# Patient Record
Sex: FEMALE | Race: WHITE | NOT HISPANIC OR LATINO | Employment: OTHER | URBAN - METROPOLITAN AREA
[De-identification: names, ages, dates, MRNs, and addresses within clinical notes are randomized per-mention and may not be internally consistent; named-entity substitution may affect disease eponyms.]

---

## 2017-01-03 ENCOUNTER — COMMUNICATION - HEALTHEAST (OUTPATIENT)
Dept: INTERNAL MEDICINE | Facility: CLINIC | Age: 82
End: 2017-01-03

## 2017-01-03 DIAGNOSIS — R52 PAIN: ICD-10-CM

## 2017-01-16 ENCOUNTER — COMMUNICATION - HEALTHEAST (OUTPATIENT)
Dept: INTERNAL MEDICINE | Facility: CLINIC | Age: 82
End: 2017-01-16

## 2017-01-16 DIAGNOSIS — M19.90 OSTEOARTHRITIS: ICD-10-CM

## 2017-01-16 DIAGNOSIS — R60.9 EDEMA: ICD-10-CM

## 2017-01-22 ENCOUNTER — COMMUNICATION - HEALTHEAST (OUTPATIENT)
Dept: INTERNAL MEDICINE | Facility: CLINIC | Age: 82
End: 2017-01-22

## 2017-01-22 DIAGNOSIS — E03.9 HYPOTHYROIDISM: ICD-10-CM

## 2017-01-27 ENCOUNTER — COMMUNICATION - HEALTHEAST (OUTPATIENT)
Dept: INTERNAL MEDICINE | Facility: CLINIC | Age: 82
End: 2017-01-27

## 2017-01-31 ENCOUNTER — RECORDS - HEALTHEAST (OUTPATIENT)
Dept: ADMINISTRATIVE | Facility: OTHER | Age: 82
End: 2017-01-31

## 2017-02-16 ENCOUNTER — RECORDS - HEALTHEAST (OUTPATIENT)
Dept: ADMINISTRATIVE | Facility: OTHER | Age: 82
End: 2017-02-16

## 2017-02-17 ENCOUNTER — COMMUNICATION - HEALTHEAST (OUTPATIENT)
Dept: SCHEDULING | Facility: CLINIC | Age: 82
End: 2017-02-17

## 2017-02-21 ENCOUNTER — OFFICE VISIT - HEALTHEAST (OUTPATIENT)
Dept: INTERNAL MEDICINE | Facility: CLINIC | Age: 82
End: 2017-02-21

## 2017-02-21 DIAGNOSIS — R60.9 EDEMA: ICD-10-CM

## 2017-02-21 DIAGNOSIS — R06.02 SHORTNESS OF BREATH: ICD-10-CM

## 2017-02-21 ASSESSMENT — MIFFLIN-ST. JEOR: SCORE: 1077.52

## 2017-02-22 ENCOUNTER — COMMUNICATION - HEALTHEAST (OUTPATIENT)
Dept: INTERNAL MEDICINE | Facility: CLINIC | Age: 82
End: 2017-02-22

## 2017-02-27 ENCOUNTER — AMBULATORY - HEALTHEAST (OUTPATIENT)
Dept: CARE COORDINATION | Facility: CLINIC | Age: 82
End: 2017-02-27

## 2017-02-28 ENCOUNTER — OFFICE VISIT - HEALTHEAST (OUTPATIENT)
Dept: INTERNAL MEDICINE | Facility: CLINIC | Age: 82
End: 2017-02-28

## 2017-02-28 DIAGNOSIS — Z01.818 PREOP EXAM FOR INTERNAL MEDICINE: ICD-10-CM

## 2017-02-28 DIAGNOSIS — E55.9 VITAMIN D DEFICIENCY: ICD-10-CM

## 2017-02-28 DIAGNOSIS — I48.0 PAROXYSMAL ATRIAL FIBRILLATION (H): ICD-10-CM

## 2017-02-28 ASSESSMENT — MIFFLIN-ST. JEOR: SCORE: 1085.46

## 2017-03-01 ENCOUNTER — COMMUNICATION - HEALTHEAST (OUTPATIENT)
Dept: INTERNAL MEDICINE | Facility: CLINIC | Age: 82
End: 2017-03-01

## 2017-03-03 ENCOUNTER — COMMUNICATION - HEALTHEAST (OUTPATIENT)
Dept: INTERNAL MEDICINE | Facility: CLINIC | Age: 82
End: 2017-03-03

## 2017-03-03 DIAGNOSIS — E78.5 HYPERLIPEMIA: ICD-10-CM

## 2017-03-07 ENCOUNTER — OFFICE VISIT - HEALTHEAST (OUTPATIENT)
Dept: CARDIOLOGY | Facility: CLINIC | Age: 82
End: 2017-03-07

## 2017-03-07 DIAGNOSIS — I48.0 PAROXYSMAL ATRIAL FIBRILLATION (H): ICD-10-CM

## 2017-03-07 DIAGNOSIS — I10 ESSENTIAL HYPERTENSION: ICD-10-CM

## 2017-03-07 LAB
ATRIAL RATE - MUSE: 78 BPM
DIASTOLIC BLOOD PRESSURE - MUSE: NORMAL MMHG
INTERPRETATION ECG - MUSE: NORMAL
P AXIS - MUSE: -26 DEGREES
PR INTERVAL - MUSE: 212 MS
QRS DURATION - MUSE: 86 MS
QT - MUSE: 424 MS
QTC - MUSE: 483 MS
R AXIS - MUSE: 57 DEGREES
SYSTOLIC BLOOD PRESSURE - MUSE: NORMAL MMHG
T AXIS - MUSE: 37 DEGREES
VENTRICULAR RATE- MUSE: 78 BPM

## 2017-03-07 ASSESSMENT — MIFFLIN-ST. JEOR: SCORE: 1090.45

## 2017-03-08 ASSESSMENT — MIFFLIN-ST. JEOR: SCORE: 1081.38

## 2017-03-14 ENCOUNTER — ANESTHESIA - HEALTHEAST (OUTPATIENT)
Dept: SURGERY | Facility: CLINIC | Age: 82
End: 2017-03-14

## 2017-03-14 ENCOUNTER — SURGERY - HEALTHEAST (OUTPATIENT)
Dept: SURGERY | Facility: CLINIC | Age: 82
End: 2017-03-14

## 2017-03-14 ASSESSMENT — MIFFLIN-ST. JEOR: SCORE: 1081.38

## 2017-03-15 ASSESSMENT — MIFFLIN-ST. JEOR: SCORE: 1102.25

## 2017-03-20 ENCOUNTER — OFFICE VISIT - HEALTHEAST (OUTPATIENT)
Dept: GERIATRICS | Facility: CLINIC | Age: 82
End: 2017-03-20

## 2017-03-20 ENCOUNTER — COMMUNICATION - HEALTHEAST (OUTPATIENT)
Dept: INTERNAL MEDICINE | Facility: CLINIC | Age: 82
End: 2017-03-20

## 2017-03-20 DIAGNOSIS — F39 MOOD DISORDER (H): ICD-10-CM

## 2017-03-20 DIAGNOSIS — D50.0 ANEMIA, BLOOD LOSS: ICD-10-CM

## 2017-03-20 DIAGNOSIS — K21.9 GERD (GASTROESOPHAGEAL REFLUX DISEASE): ICD-10-CM

## 2017-03-20 DIAGNOSIS — M17.11 OSTEOARTHRITIS OF RIGHT KNEE: ICD-10-CM

## 2017-03-20 DIAGNOSIS — I48.0 PAROXYSMAL ATRIAL FIBRILLATION (H): ICD-10-CM

## 2017-03-20 DIAGNOSIS — M85.80 OSTEOPENIA: ICD-10-CM

## 2017-03-20 DIAGNOSIS — E03.9 HYPOTHYROIDISM: ICD-10-CM

## 2017-03-23 ENCOUNTER — OFFICE VISIT - HEALTHEAST (OUTPATIENT)
Dept: GERIATRICS | Facility: CLINIC | Age: 82
End: 2017-03-23

## 2017-03-23 DIAGNOSIS — K21.9 GASTROESOPHAGEAL REFLUX DISEASE WITHOUT ESOPHAGITIS: ICD-10-CM

## 2017-03-23 DIAGNOSIS — R52 PAIN MANAGEMENT: ICD-10-CM

## 2017-03-23 DIAGNOSIS — F39 MOOD DISORDER (H): ICD-10-CM

## 2017-03-23 DIAGNOSIS — D50.0 ANEMIA, BLOOD LOSS: ICD-10-CM

## 2017-03-23 DIAGNOSIS — I48.0 PAROXYSMAL ATRIAL FIBRILLATION (H): ICD-10-CM

## 2017-03-23 DIAGNOSIS — M17.11 PRIMARY OSTEOARTHRITIS OF RIGHT KNEE: ICD-10-CM

## 2017-03-27 ENCOUNTER — COMMUNICATION - HEALTHEAST (OUTPATIENT)
Dept: INTERNAL MEDICINE | Facility: CLINIC | Age: 82
End: 2017-03-27

## 2017-03-27 ENCOUNTER — AMBULATORY - HEALTHEAST (OUTPATIENT)
Dept: GERIATRICS | Facility: CLINIC | Age: 82
End: 2017-03-27

## 2017-03-29 ENCOUNTER — AMBULATORY - HEALTHEAST (OUTPATIENT)
Dept: CARE COORDINATION | Facility: CLINIC | Age: 82
End: 2017-03-29

## 2017-04-04 ENCOUNTER — RECORDS - HEALTHEAST (OUTPATIENT)
Dept: ADMINISTRATIVE | Facility: OTHER | Age: 82
End: 2017-04-04

## 2017-04-10 ENCOUNTER — OFFICE VISIT - HEALTHEAST (OUTPATIENT)
Dept: INTERNAL MEDICINE | Facility: CLINIC | Age: 82
End: 2017-04-10

## 2017-04-10 ENCOUNTER — COMMUNICATION - HEALTHEAST (OUTPATIENT)
Dept: INTERNAL MEDICINE | Facility: CLINIC | Age: 82
End: 2017-04-10

## 2017-04-10 DIAGNOSIS — Z51.81 MEDICATION MONITORING ENCOUNTER: ICD-10-CM

## 2017-04-10 DIAGNOSIS — G89.18 POST-OPERATIVE PAIN: ICD-10-CM

## 2017-04-10 DIAGNOSIS — D64.9 ANEMIA: ICD-10-CM

## 2017-04-10 ASSESSMENT — MIFFLIN-ST. JEOR: SCORE: 1085.46

## 2017-04-11 ENCOUNTER — COMMUNICATION - HEALTHEAST (OUTPATIENT)
Dept: INTERNAL MEDICINE | Facility: CLINIC | Age: 82
End: 2017-04-11

## 2017-04-14 ENCOUNTER — COMMUNICATION - HEALTHEAST (OUTPATIENT)
Dept: INTERNAL MEDICINE | Facility: CLINIC | Age: 82
End: 2017-04-14

## 2017-04-14 DIAGNOSIS — R52 PAIN: ICD-10-CM

## 2017-04-18 ENCOUNTER — COMMUNICATION - HEALTHEAST (OUTPATIENT)
Dept: CARE COORDINATION | Facility: CLINIC | Age: 82
End: 2017-04-18

## 2017-04-20 ENCOUNTER — COMMUNICATION - HEALTHEAST (OUTPATIENT)
Dept: INTERNAL MEDICINE | Facility: CLINIC | Age: 82
End: 2017-04-20

## 2017-04-25 ENCOUNTER — COMMUNICATION - HEALTHEAST (OUTPATIENT)
Dept: INTERNAL MEDICINE | Facility: CLINIC | Age: 82
End: 2017-04-25

## 2017-04-27 ENCOUNTER — COMMUNICATION - HEALTHEAST (OUTPATIENT)
Dept: INTERNAL MEDICINE | Facility: CLINIC | Age: 82
End: 2017-04-27

## 2017-05-01 ENCOUNTER — COMMUNICATION - HEALTHEAST (OUTPATIENT)
Dept: INTERNAL MEDICINE | Facility: CLINIC | Age: 82
End: 2017-05-01

## 2017-05-01 ENCOUNTER — COMMUNICATION - HEALTHEAST (OUTPATIENT)
Dept: SCHEDULING | Facility: CLINIC | Age: 82
End: 2017-05-01

## 2017-05-01 DIAGNOSIS — E03.9 HYPOTHYROIDISM: ICD-10-CM

## 2017-05-02 ENCOUNTER — COMMUNICATION - HEALTHEAST (OUTPATIENT)
Dept: CARE COORDINATION | Facility: CLINIC | Age: 82
End: 2017-05-02

## 2017-05-03 ENCOUNTER — COMMUNICATION - HEALTHEAST (OUTPATIENT)
Dept: CARE COORDINATION | Facility: CLINIC | Age: 82
End: 2017-05-03

## 2017-05-24 ENCOUNTER — COMMUNICATION - HEALTHEAST (OUTPATIENT)
Dept: CARE COORDINATION | Facility: CLINIC | Age: 82
End: 2017-05-24

## 2017-05-26 ENCOUNTER — COMMUNICATION - HEALTHEAST (OUTPATIENT)
Dept: CARE COORDINATION | Facility: CLINIC | Age: 82
End: 2017-05-26

## 2017-06-01 ENCOUNTER — COMMUNICATION - HEALTHEAST (OUTPATIENT)
Dept: CARE COORDINATION | Facility: CLINIC | Age: 82
End: 2017-06-01

## 2017-06-02 ENCOUNTER — COMMUNICATION - HEALTHEAST (OUTPATIENT)
Dept: INTERNAL MEDICINE | Facility: CLINIC | Age: 82
End: 2017-06-02

## 2017-06-03 ENCOUNTER — COMMUNICATION - HEALTHEAST (OUTPATIENT)
Dept: CARDIOLOGY | Facility: CLINIC | Age: 82
End: 2017-06-03

## 2017-06-03 DIAGNOSIS — I48.0 PAROXYSMAL ATRIAL FIBRILLATION (H): ICD-10-CM

## 2017-06-06 ENCOUNTER — RECORDS - HEALTHEAST (OUTPATIENT)
Dept: MAMMOGRAPHY | Facility: CLINIC | Age: 82
End: 2017-06-06

## 2017-06-06 DIAGNOSIS — Z12.31 ENCOUNTER FOR SCREENING MAMMOGRAM FOR MALIGNANT NEOPLASM OF BREAST: ICD-10-CM

## 2017-06-08 ENCOUNTER — OFFICE VISIT - HEALTHEAST (OUTPATIENT)
Dept: INTERNAL MEDICINE | Facility: CLINIC | Age: 82
End: 2017-06-08

## 2017-06-08 DIAGNOSIS — R60.9 EDEMA: ICD-10-CM

## 2017-06-08 DIAGNOSIS — I50.32 DIASTOLIC CHF, CHRONIC (H): ICD-10-CM

## 2017-06-09 ENCOUNTER — COMMUNICATION - HEALTHEAST (OUTPATIENT)
Dept: INTERNAL MEDICINE | Facility: CLINIC | Age: 82
End: 2017-06-09

## 2017-06-12 ENCOUNTER — COMMUNICATION - HEALTHEAST (OUTPATIENT)
Dept: CARE COORDINATION | Facility: CLINIC | Age: 82
End: 2017-06-12

## 2017-06-20 ENCOUNTER — RECORDS - HEALTHEAST (OUTPATIENT)
Dept: ADMINISTRATIVE | Facility: OTHER | Age: 82
End: 2017-06-20

## 2017-06-22 ENCOUNTER — OFFICE VISIT - HEALTHEAST (OUTPATIENT)
Dept: CARDIOLOGY | Facility: CLINIC | Age: 82
End: 2017-06-22

## 2017-06-22 DIAGNOSIS — I10 ESSENTIAL HYPERTENSION WITH GOAL BLOOD PRESSURE LESS THAN 140/90: ICD-10-CM

## 2017-06-22 DIAGNOSIS — I48.0 PAROXYSMAL ATRIAL FIBRILLATION (H): ICD-10-CM

## 2017-06-22 DIAGNOSIS — G47.33 OBSTRUCTIVE SLEEP APNEA (ADULT) (PEDIATRIC): ICD-10-CM

## 2017-06-22 DIAGNOSIS — I50.31 ACUTE DIASTOLIC HEART FAILURE (H): ICD-10-CM

## 2017-06-22 ASSESSMENT — MIFFLIN-ST. JEOR: SCORE: 1058.7

## 2017-06-25 ENCOUNTER — COMMUNICATION - HEALTHEAST (OUTPATIENT)
Dept: INTERNAL MEDICINE | Facility: CLINIC | Age: 82
End: 2017-06-25

## 2017-06-25 DIAGNOSIS — K21.9 ESOPHAGEAL REFLUX: ICD-10-CM

## 2017-07-10 ENCOUNTER — OFFICE VISIT - HEALTHEAST (OUTPATIENT)
Dept: INTERNAL MEDICINE | Facility: CLINIC | Age: 82
End: 2017-07-10

## 2017-07-10 DIAGNOSIS — E78.5 HYPERLIPIDEMIA: ICD-10-CM

## 2017-07-10 DIAGNOSIS — E03.9 HYPOTHYROIDISM: ICD-10-CM

## 2017-07-10 DIAGNOSIS — M81.0 OSTEOPOROSIS: ICD-10-CM

## 2017-07-10 DIAGNOSIS — Z51.81 MEDICATION MONITORING ENCOUNTER: ICD-10-CM

## 2017-07-10 LAB
CHOLEST SERPL-MCNC: 178 MG/DL
FASTING STATUS PATIENT QL REPORTED: NORMAL
HDLC SERPL-MCNC: 52 MG/DL
LDLC SERPL CALC-MCNC: 100 MG/DL
TRIGL SERPL-MCNC: 130 MG/DL

## 2017-07-10 ASSESSMENT — MIFFLIN-ST. JEOR: SCORE: 1061.88

## 2017-07-12 ENCOUNTER — COMMUNICATION - HEALTHEAST (OUTPATIENT)
Dept: INTERNAL MEDICINE | Facility: CLINIC | Age: 82
End: 2017-07-12

## 2017-08-13 ENCOUNTER — COMMUNICATION - HEALTHEAST (OUTPATIENT)
Dept: RHEUMATOLOGY | Facility: CLINIC | Age: 82
End: 2017-08-13

## 2017-08-13 DIAGNOSIS — G89.29 OTHER CHRONIC PAIN: ICD-10-CM

## 2017-08-14 ENCOUNTER — COMMUNICATION - HEALTHEAST (OUTPATIENT)
Dept: RHEUMATOLOGY | Facility: CLINIC | Age: 82
End: 2017-08-14

## 2017-08-14 DIAGNOSIS — G89.29 OTHER CHRONIC PAIN: ICD-10-CM

## 2017-08-15 ENCOUNTER — OFFICE VISIT - HEALTHEAST (OUTPATIENT)
Dept: INTERNAL MEDICINE | Facility: CLINIC | Age: 82
End: 2017-08-15

## 2017-08-15 DIAGNOSIS — F32.9 REACTIVE DEPRESSION: ICD-10-CM

## 2017-08-15 ASSESSMENT — MIFFLIN-ST. JEOR: SCORE: 1069.13

## 2017-08-29 ENCOUNTER — RECORDS - HEALTHEAST (OUTPATIENT)
Dept: ADMINISTRATIVE | Facility: OTHER | Age: 82
End: 2017-08-29

## 2017-09-08 ENCOUNTER — COMMUNICATION - HEALTHEAST (OUTPATIENT)
Dept: CARE COORDINATION | Facility: CLINIC | Age: 82
End: 2017-09-08

## 2017-09-11 ENCOUNTER — RECORDS - HEALTHEAST (OUTPATIENT)
Dept: BONE DENSITY | Facility: CLINIC | Age: 82
End: 2017-09-11

## 2017-09-11 ENCOUNTER — RECORDS - HEALTHEAST (OUTPATIENT)
Dept: ADMINISTRATIVE | Facility: OTHER | Age: 82
End: 2017-09-11

## 2017-09-11 DIAGNOSIS — M81.0 AGE-RELATED OSTEOPOROSIS WITHOUT CURRENT PATHOLOGICAL FRACTURE: ICD-10-CM

## 2017-09-14 ENCOUNTER — COMMUNICATION - HEALTHEAST (OUTPATIENT)
Dept: CARE COORDINATION | Facility: CLINIC | Age: 82
End: 2017-09-14

## 2017-09-26 ENCOUNTER — COMMUNICATION - HEALTHEAST (OUTPATIENT)
Dept: CARE COORDINATION | Facility: CLINIC | Age: 82
End: 2017-09-26

## 2017-09-26 ENCOUNTER — COMMUNICATION - HEALTHEAST (OUTPATIENT)
Dept: INTERNAL MEDICINE | Facility: CLINIC | Age: 82
End: 2017-09-26

## 2017-09-26 ENCOUNTER — OFFICE VISIT - HEALTHEAST (OUTPATIENT)
Dept: INTERNAL MEDICINE | Facility: CLINIC | Age: 82
End: 2017-09-26

## 2017-09-26 ENCOUNTER — COMMUNICATION - HEALTHEAST (OUTPATIENT)
Dept: SCHEDULING | Facility: CLINIC | Age: 82
End: 2017-09-26

## 2017-09-26 ENCOUNTER — HOSPITAL ENCOUNTER (OUTPATIENT)
Dept: LAB | Age: 82
Setting detail: SPECIMEN
Discharge: HOME | End: 2017-09-26

## 2017-09-26 DIAGNOSIS — M81.0 OSTEOPOROSIS: ICD-10-CM

## 2017-09-26 DIAGNOSIS — M54.50 LOW BACK PAIN: ICD-10-CM

## 2017-09-26 DIAGNOSIS — Z23 NEED FOR INFLUENZA VACCINATION: ICD-10-CM

## 2017-09-26 ASSESSMENT — MIFFLIN-ST. JEOR: SCORE: 1083.65

## 2017-09-27 ENCOUNTER — COMMUNICATION - HEALTHEAST (OUTPATIENT)
Dept: INTERNAL MEDICINE | Facility: CLINIC | Age: 82
End: 2017-09-27

## 2017-10-13 ENCOUNTER — COMMUNICATION - HEALTHEAST (OUTPATIENT)
Dept: INTERNAL MEDICINE | Facility: CLINIC | Age: 82
End: 2017-10-13

## 2017-10-13 DIAGNOSIS — M54.50 LOW BACK PAIN: ICD-10-CM

## 2017-10-16 ENCOUNTER — COMMUNICATION - HEALTHEAST (OUTPATIENT)
Dept: CARE COORDINATION | Facility: CLINIC | Age: 82
End: 2017-10-16

## 2017-10-23 ENCOUNTER — COMMUNICATION - HEALTHEAST (OUTPATIENT)
Dept: CARE COORDINATION | Facility: CLINIC | Age: 82
End: 2017-10-23

## 2017-10-24 ENCOUNTER — OFFICE VISIT - HEALTHEAST (OUTPATIENT)
Dept: INTERNAL MEDICINE | Facility: CLINIC | Age: 82
End: 2017-10-24

## 2017-10-24 ENCOUNTER — COMMUNICATION - HEALTHEAST (OUTPATIENT)
Dept: INTERNAL MEDICINE | Facility: CLINIC | Age: 82
End: 2017-10-24

## 2017-10-24 DIAGNOSIS — M81.0 OSTEOPOROSIS: ICD-10-CM

## 2017-10-24 ASSESSMENT — MIFFLIN-ST. JEOR: SCORE: 1093.17

## 2017-10-27 ENCOUNTER — COMMUNICATION - HEALTHEAST (OUTPATIENT)
Dept: INTERNAL MEDICINE | Facility: CLINIC | Age: 82
End: 2017-10-27

## 2017-10-27 ENCOUNTER — COMMUNICATION - HEALTHEAST (OUTPATIENT)
Dept: CARE COORDINATION | Facility: CLINIC | Age: 82
End: 2017-10-27

## 2017-11-01 ENCOUNTER — COMMUNICATION - HEALTHEAST (OUTPATIENT)
Dept: CARE COORDINATION | Facility: CLINIC | Age: 82
End: 2017-11-01

## 2017-11-02 ENCOUNTER — OFFICE VISIT - HEALTHEAST (OUTPATIENT)
Dept: CARDIOLOGY | Facility: CLINIC | Age: 82
End: 2017-11-02

## 2017-11-02 ENCOUNTER — AMBULATORY - HEALTHEAST (OUTPATIENT)
Dept: CARDIOLOGY | Facility: CLINIC | Age: 82
End: 2017-11-02

## 2017-11-02 DIAGNOSIS — I50.30 HEART FAILURE WITH PRESERVED EJECTION FRACTION (H): ICD-10-CM

## 2017-11-02 ASSESSMENT — MIFFLIN-ST. JEOR: SCORE: 1074.57

## 2017-11-06 ENCOUNTER — COMMUNICATION - HEALTHEAST (OUTPATIENT)
Dept: CARDIOLOGY | Facility: CLINIC | Age: 82
End: 2017-11-06

## 2017-11-22 ENCOUNTER — COMMUNICATION - HEALTHEAST (OUTPATIENT)
Dept: CARDIOLOGY | Facility: CLINIC | Age: 82
End: 2017-11-22

## 2017-11-22 ENCOUNTER — COMMUNICATION - HEALTHEAST (OUTPATIENT)
Dept: CARE COORDINATION | Facility: CLINIC | Age: 82
End: 2017-11-22

## 2017-11-22 DIAGNOSIS — I50.30 HEART FAILURE WITH PRESERVED EJECTION FRACTION (H): ICD-10-CM

## 2017-11-27 ENCOUNTER — COMMUNICATION - HEALTHEAST (OUTPATIENT)
Dept: CARE COORDINATION | Facility: CLINIC | Age: 82
End: 2017-11-27

## 2017-11-30 ENCOUNTER — OFFICE VISIT - HEALTHEAST (OUTPATIENT)
Dept: CARDIOLOGY | Facility: CLINIC | Age: 82
End: 2017-11-30

## 2017-11-30 ENCOUNTER — RECORDS - HEALTHEAST (OUTPATIENT)
Dept: GENERAL RADIOLOGY | Facility: CLINIC | Age: 82
End: 2017-11-30

## 2017-11-30 ENCOUNTER — OFFICE VISIT - HEALTHEAST (OUTPATIENT)
Dept: INTERNAL MEDICINE | Facility: CLINIC | Age: 82
End: 2017-11-30

## 2017-11-30 ENCOUNTER — AMBULATORY - HEALTHEAST (OUTPATIENT)
Dept: CARDIOLOGY | Facility: CLINIC | Age: 82
End: 2017-11-30

## 2017-11-30 DIAGNOSIS — M54.50 LOW BACK PAIN: ICD-10-CM

## 2017-11-30 DIAGNOSIS — I10 ESSENTIAL HYPERTENSION WITH GOAL BLOOD PRESSURE LESS THAN 140/90: ICD-10-CM

## 2017-11-30 DIAGNOSIS — M54.2 CERVICALGIA: ICD-10-CM

## 2017-11-30 DIAGNOSIS — I50.30 HEART FAILURE WITH PRESERVED EJECTION FRACTION (H): ICD-10-CM

## 2017-11-30 ASSESSMENT — MIFFLIN-ST. JEOR
SCORE: 1088.18
SCORE: 1097.25

## 2017-12-03 ENCOUNTER — COMMUNICATION - HEALTHEAST (OUTPATIENT)
Dept: CARDIOLOGY | Facility: CLINIC | Age: 82
End: 2017-12-03

## 2017-12-03 DIAGNOSIS — I48.0 PAROXYSMAL ATRIAL FIBRILLATION (H): ICD-10-CM

## 2017-12-04 ENCOUNTER — COMMUNICATION - HEALTHEAST (OUTPATIENT)
Dept: CARE COORDINATION | Facility: CLINIC | Age: 82
End: 2017-12-04

## 2017-12-05 ENCOUNTER — RECORDS - HEALTHEAST (OUTPATIENT)
Dept: ADMINISTRATIVE | Facility: OTHER | Age: 82
End: 2017-12-05

## 2017-12-13 ENCOUNTER — RECORDS - HEALTHEAST (OUTPATIENT)
Dept: ADMINISTRATIVE | Facility: OTHER | Age: 82
End: 2017-12-13

## 2017-12-15 ENCOUNTER — COMMUNICATION - HEALTHEAST (OUTPATIENT)
Dept: INTERNAL MEDICINE | Facility: CLINIC | Age: 82
End: 2017-12-15

## 2017-12-21 ENCOUNTER — RECORDS - HEALTHEAST (OUTPATIENT)
Dept: ADMINISTRATIVE | Facility: OTHER | Age: 82
End: 2017-12-21

## 2017-12-29 ENCOUNTER — RECORDS - HEALTHEAST (OUTPATIENT)
Dept: ADMINISTRATIVE | Facility: OTHER | Age: 82
End: 2017-12-29

## 2018-01-05 ENCOUNTER — COMMUNICATION - HEALTHEAST (OUTPATIENT)
Dept: CARE COORDINATION | Facility: CLINIC | Age: 83
End: 2018-01-05

## 2018-01-05 ENCOUNTER — COMMUNICATION - HEALTHEAST (OUTPATIENT)
Dept: CARDIOLOGY | Facility: CLINIC | Age: 83
End: 2018-01-05

## 2018-01-09 ENCOUNTER — COMMUNICATION - HEALTHEAST (OUTPATIENT)
Dept: TELEHEALTH | Facility: CLINIC | Age: 83
End: 2018-01-09

## 2018-01-10 ENCOUNTER — COMMUNICATION - HEALTHEAST (OUTPATIENT)
Dept: INTERNAL MEDICINE | Facility: CLINIC | Age: 83
End: 2018-01-10

## 2018-01-10 DIAGNOSIS — M54.50 LOW BACK PAIN: ICD-10-CM

## 2018-01-15 ENCOUNTER — OFFICE VISIT - HEALTHEAST (OUTPATIENT)
Dept: INTERNAL MEDICINE | Facility: CLINIC | Age: 83
End: 2018-01-15

## 2018-01-15 ENCOUNTER — COMMUNICATION - HEALTHEAST (OUTPATIENT)
Dept: CARDIOLOGY | Facility: CLINIC | Age: 83
End: 2018-01-15

## 2018-01-15 ENCOUNTER — COMMUNICATION - HEALTHEAST (OUTPATIENT)
Dept: INTERNAL MEDICINE | Facility: CLINIC | Age: 83
End: 2018-01-15

## 2018-01-15 DIAGNOSIS — T50.2X5A DIURETIC-INDUCED HYPOKALEMIA: ICD-10-CM

## 2018-01-15 DIAGNOSIS — E87.6 DIURETIC-INDUCED HYPOKALEMIA: ICD-10-CM

## 2018-01-15 DIAGNOSIS — D50.9 IRON DEFICIENCY ANEMIA: ICD-10-CM

## 2018-01-15 DIAGNOSIS — D64.9 ANEMIA: ICD-10-CM

## 2018-01-15 DIAGNOSIS — I50.30 HEART FAILURE WITH PRESERVED EJECTION FRACTION (H): ICD-10-CM

## 2018-01-15 LAB
ERYTHROCYTE [SEDIMENTATION RATE] IN BLOOD BY WESTERGREN METHOD: 23 MM/HR (ref 0–20)
RETICS # AUTO: 0.08 MILL/UL (ref 0.01–0.11)

## 2018-01-16 LAB
FERRITIN SERPL-MCNC: 17 NG/ML (ref 10–130)
IRON SATN MFR SERPL: 6 % (ref 20–50)
IRON SERPL-MCNC: 31 UG/DL (ref 42–175)
TIBC SERPL-MCNC: 527 UG/DL (ref 313–563)
TRANSFERRIN SERPL-MCNC: 422 MG/DL (ref 212–360)
VIT B12 SERPL-MCNC: 468 PG/ML (ref 213–816)

## 2018-01-17 ENCOUNTER — OFFICE VISIT - HEALTHEAST (OUTPATIENT)
Dept: CARDIOLOGY | Facility: CLINIC | Age: 83
End: 2018-01-17

## 2018-01-17 DIAGNOSIS — I50.30 HEART FAILURE WITH PRESERVED EJECTION FRACTION (H): ICD-10-CM

## 2018-01-17 DIAGNOSIS — I10 ESSENTIAL HYPERTENSION WITH GOAL BLOOD PRESSURE LESS THAN 140/90: ICD-10-CM

## 2018-01-17 DIAGNOSIS — I48.0 PAROXYSMAL ATRIAL FIBRILLATION (H): ICD-10-CM

## 2018-01-17 DIAGNOSIS — R60.9 EDEMA: ICD-10-CM

## 2018-01-17 DIAGNOSIS — I05.0 MITRAL VALVE STENOSIS, MILD: ICD-10-CM

## 2018-01-17 LAB
ALBUMIN PERCENT: 60.7 % (ref 51–67)
ALBUMIN SERPL ELPH-MCNC: 4.3 G/DL (ref 3.2–4.7)
ALPHA 1 PERCENT: 2.6 % (ref 2–4)
ALPHA 2 PERCENT: 12.5 % (ref 5–13)
ALPHA1 GLOB SERPL ELPH-MCNC: 0.2 G/DL (ref 0.1–0.3)
ALPHA2 GLOB SERPL ELPH-MCNC: 0.9 G/DL (ref 0.4–0.9)
B-GLOBULIN SERPL ELPH-MCNC: 1 G/DL (ref 0.7–1.2)
BETA PERCENT: 14.6 % (ref 10–17)
GAMMA GLOB SERPL ELPH-MCNC: 0.7 G/DL (ref 0.6–1.4)
GAMMA GLOBULIN PERCENT: 9.6 % (ref 9–20)
PATH ICD:: NORMAL
PROT PATTERN SERPL ELPH-IMP: NORMAL
PROT SERPL-MCNC: 7.1 G/DL (ref 6–8)
REVIEWING PATHOLOGIST: NORMAL

## 2018-01-17 ASSESSMENT — MIFFLIN-ST. JEOR: SCORE: 1106.32

## 2018-01-19 ENCOUNTER — RECORDS - HEALTHEAST (OUTPATIENT)
Dept: ADMINISTRATIVE | Facility: OTHER | Age: 83
End: 2018-01-19

## 2018-01-19 ENCOUNTER — COMMUNICATION - HEALTHEAST (OUTPATIENT)
Dept: INTERNAL MEDICINE | Facility: CLINIC | Age: 83
End: 2018-01-19

## 2018-01-22 ENCOUNTER — HOSPITAL ENCOUNTER (OUTPATIENT)
Dept: CARDIOLOGY | Facility: HOSPITAL | Age: 83
Discharge: HOME | End: 2018-01-22
Attending: INTERNAL MEDICINE

## 2018-01-22 DIAGNOSIS — R60.9 EDEMA: ICD-10-CM

## 2018-01-22 DIAGNOSIS — I50.30 HEART FAILURE WITH PRESERVED EJECTION FRACTION (H): ICD-10-CM

## 2018-01-22 DIAGNOSIS — I48.0 PAROXYSMAL ATRIAL FIBRILLATION (H): ICD-10-CM

## 2018-01-25 ENCOUNTER — COMMUNICATION - HEALTHEAST (OUTPATIENT)
Dept: INTERNAL MEDICINE | Facility: CLINIC | Age: 83
End: 2018-01-25

## 2018-01-25 DIAGNOSIS — D50.9 IRON DEFICIENCY ANEMIA: ICD-10-CM

## 2018-01-26 ENCOUNTER — COMMUNICATION - HEALTHEAST (OUTPATIENT)
Dept: CARE COORDINATION | Facility: CLINIC | Age: 83
End: 2018-01-26

## 2018-01-26 DIAGNOSIS — I50.30 HEART FAILURE WITH PRESERVED EJECTION FRACTION (H): ICD-10-CM

## 2018-01-29 ENCOUNTER — COMMUNICATION - HEALTHEAST (OUTPATIENT)
Dept: TELEHEALTH | Facility: CLINIC | Age: 83
End: 2018-01-29

## 2018-01-30 ENCOUNTER — OFFICE VISIT - HEALTHEAST (OUTPATIENT)
Dept: CARDIOLOGY | Facility: CLINIC | Age: 83
End: 2018-01-30

## 2018-01-30 DIAGNOSIS — I10 ESSENTIAL HYPERTENSION: ICD-10-CM

## 2018-01-30 DIAGNOSIS — I48.0 PAROXYSMAL ATRIAL FIBRILLATION (H): ICD-10-CM

## 2018-01-30 DIAGNOSIS — R06.02 SOB (SHORTNESS OF BREATH): ICD-10-CM

## 2018-01-30 ASSESSMENT — MIFFLIN-ST. JEOR: SCORE: 1119.93

## 2018-02-09 ENCOUNTER — COMMUNICATION - HEALTHEAST (OUTPATIENT)
Dept: CARE COORDINATION | Facility: CLINIC | Age: 83
End: 2018-02-09

## 2018-02-09 DIAGNOSIS — J11.1 INFLUENZA: ICD-10-CM

## 2018-02-14 ENCOUNTER — COMMUNICATION - HEALTHEAST (OUTPATIENT)
Dept: CARE COORDINATION | Facility: CLINIC | Age: 83
End: 2018-02-14

## 2018-02-15 ENCOUNTER — COMMUNICATION - HEALTHEAST (OUTPATIENT)
Dept: INTERNAL MEDICINE | Facility: CLINIC | Age: 83
End: 2018-02-15

## 2018-02-19 ENCOUNTER — OFFICE VISIT - HEALTHEAST (OUTPATIENT)
Dept: INTERNAL MEDICINE | Facility: CLINIC | Age: 83
End: 2018-02-19

## 2018-02-19 DIAGNOSIS — L30.9 DERMATITIS: ICD-10-CM

## 2018-02-19 DIAGNOSIS — I50.32 CHRONIC DIASTOLIC CONGESTIVE HEART FAILURE (H): ICD-10-CM

## 2018-02-19 DIAGNOSIS — Z51.81 MEDICATION MONITORING ENCOUNTER: ICD-10-CM

## 2018-02-19 DIAGNOSIS — D50.9 IRON DEFICIENCY ANEMIA: ICD-10-CM

## 2018-02-19 LAB
ANION GAP SERPL CALCULATED.3IONS-SCNC: 9 MMOL/L (ref 5–18)
BUN SERPL-MCNC: 16 MG/DL (ref 8–28)
CALCIUM SERPL-MCNC: 9.4 MG/DL (ref 8.5–10.5)
CHLORIDE BLD-SCNC: 98 MMOL/L (ref 98–107)
CO2 SERPL-SCNC: 30 MMOL/L (ref 22–31)
CREAT SERPL-MCNC: 0.76 MG/DL (ref 0.6–1.1)
ERYTHROCYTE [DISTWIDTH] IN BLOOD BY AUTOMATED COUNT: 14.8 % (ref 11–14.5)
GFR SERPL CREATININE-BSD FRML MDRD: >60 ML/MIN/1.73M2
GLUCOSE BLD-MCNC: 71 MG/DL (ref 70–125)
HCT VFR BLD AUTO: 35.5 % (ref 35–47)
HGB BLD-MCNC: 11.6 G/DL (ref 12–16)
MCH RBC QN AUTO: 25.5 PG (ref 27–34)
MCHC RBC AUTO-ENTMCNC: 32.7 G/DL (ref 32–36)
MCV RBC AUTO: 78 FL (ref 80–100)
PLATELET # BLD AUTO: 243 THOU/UL (ref 140–440)
PMV BLD AUTO: 6.9 FL (ref 7–10)
POTASSIUM BLD-SCNC: 4.2 MMOL/L (ref 3.5–5)
RBC # BLD AUTO: 4.55 MILL/UL (ref 3.8–5.4)
SODIUM SERPL-SCNC: 137 MMOL/L (ref 136–145)
WBC: 6.6 THOU/UL (ref 4–11)

## 2018-02-23 ENCOUNTER — HOSPITAL ENCOUNTER (OUTPATIENT)
Dept: NUCLEAR MEDICINE | Facility: HOSPITAL | Age: 83
Discharge: HOME | End: 2018-02-23
Attending: INTERNAL MEDICINE

## 2018-02-23 ENCOUNTER — HOSPITAL ENCOUNTER (OUTPATIENT)
Dept: CARDIOLOGY | Facility: HOSPITAL | Age: 83
Discharge: HOME | End: 2018-02-23
Attending: INTERNAL MEDICINE

## 2018-02-23 DIAGNOSIS — R06.02 SOB (SHORTNESS OF BREATH): ICD-10-CM

## 2018-02-23 LAB
CV STRESS CURRENT BP HE: NORMAL
CV STRESS CURRENT HR HE: 102
CV STRESS CURRENT HR HE: 106
CV STRESS CURRENT HR HE: 106
CV STRESS CURRENT HR HE: 107
CV STRESS CURRENT HR HE: 108
CV STRESS CURRENT HR HE: 115
CV STRESS CURRENT HR HE: 117
CV STRESS CURRENT HR HE: 118
CV STRESS CURRENT HR HE: 118
CV STRESS CURRENT HR HE: 121
CV STRESS CURRENT HR HE: 122
CV STRESS CURRENT HR HE: 122
CV STRESS CURRENT HR HE: 78
CV STRESS CURRENT HR HE: 78
CV STRESS CURRENT HR HE: 81
CV STRESS CURRENT HR HE: 85
CV STRESS CURRENT HR HE: 87
CV STRESS CURRENT HR HE: 92
CV STRESS CURRENT HR HE: 92
CV STRESS CURRENT HR HE: 93
CV STRESS CURRENT HR HE: 94
CV STRESS CURRENT HR HE: 95
CV STRESS CURRENT HR HE: 97
CV STRESS CURRENT HR HE: 98
CV STRESS CURRENT HR HE: 98
CV STRESS CURRENT HR HE: 99
CV STRESS CURRENT HR HE: 99
CV STRESS DEVIATION TIME HE: NORMAL
CV STRESS ECHO PERCENT HR HE: NORMAL
CV STRESS EXERCISE STAGE HE: NORMAL
CV STRESS FINAL RESTING BP HE: NORMAL
CV STRESS FINAL RESTING HR HE: 94
CV STRESS MAX HR HE: 122
CV STRESS MAX TREADMILL GRADE HE: 0
CV STRESS MAX TREADMILL SPEED HE: 0
CV STRESS PEAK DIA BP HE: NORMAL
CV STRESS PEAK SYS BP HE: NORMAL
CV STRESS PHASE HE: NORMAL
CV STRESS PROTOCOL HE: NORMAL
CV STRESS RESTING PT POSITION HE: NORMAL
CV STRESS RESTING PT POSITION HE: NORMAL
CV STRESS ST DEVIATION AMOUNT HE: NORMAL
CV STRESS ST DEVIATION ELEVATION HE: NORMAL
CV STRESS ST EVELATION AMOUNT HE: NORMAL
CV STRESS TEST TYPE HE: NORMAL
CV STRESS TOTAL STAGE TIME MIN 1 HE: NORMAL
NUC STRESS EJECTION FRACTION: 63 %
STRESS ECHO BASELINE BP: NORMAL
STRESS ECHO BASELINE HR: 82
STRESS ECHO CALCULATED PERCENT HR: 88 %
STRESS ECHO LAST STRESS BP: NORMAL
STRESS ECHO LAST STRESS HR: 115

## 2018-02-26 ENCOUNTER — COMMUNICATION - HEALTHEAST (OUTPATIENT)
Dept: CARE COORDINATION | Facility: CLINIC | Age: 83
End: 2018-02-26

## 2018-03-08 ENCOUNTER — AMBULATORY - HEALTHEAST (OUTPATIENT)
Dept: LAB | Facility: CLINIC | Age: 83
End: 2018-03-08

## 2018-03-08 DIAGNOSIS — Z12.11 SCREENING FOR COLON CANCER: ICD-10-CM

## 2018-03-08 LAB
HEMOCCULT SP1 STL QL: NEGATIVE
HEMOCCULT SP2 STL QL: NEGATIVE
HEMOCCULT SP3 STL QL: NEGATIVE

## 2018-03-09 ENCOUNTER — AMBULATORY - HEALTHEAST (OUTPATIENT)
Dept: PULMONOLOGY | Facility: OTHER | Age: 83
End: 2018-03-09

## 2018-03-09 ENCOUNTER — OFFICE VISIT - HEALTHEAST (OUTPATIENT)
Dept: CARDIOLOGY | Facility: CLINIC | Age: 83
End: 2018-03-09

## 2018-03-09 DIAGNOSIS — I48.0 PAROXYSMAL ATRIAL FIBRILLATION (H): ICD-10-CM

## 2018-03-09 DIAGNOSIS — I10 ESSENTIAL HYPERTENSION WITH GOAL BLOOD PRESSURE LESS THAN 140/90: ICD-10-CM

## 2018-03-09 DIAGNOSIS — R06.09 DYSPNEA ON EXERTION: ICD-10-CM

## 2018-03-09 DIAGNOSIS — R06.02 SOB (SHORTNESS OF BREATH): ICD-10-CM

## 2018-03-09 DIAGNOSIS — I50.30 HEART FAILURE WITH PRESERVED EJECTION FRACTION (H): ICD-10-CM

## 2018-03-09 ASSESSMENT — MIFFLIN-ST. JEOR: SCORE: 1129

## 2018-03-16 ENCOUNTER — COMMUNICATION - HEALTHEAST (OUTPATIENT)
Dept: CARE COORDINATION | Facility: CLINIC | Age: 83
End: 2018-03-16

## 2018-03-19 ENCOUNTER — RECORDS - HEALTHEAST (OUTPATIENT)
Dept: ADMINISTRATIVE | Facility: OTHER | Age: 83
End: 2018-03-19

## 2018-03-26 ENCOUNTER — COMMUNICATION - HEALTHEAST (OUTPATIENT)
Dept: INTERNAL MEDICINE | Facility: CLINIC | Age: 83
End: 2018-03-26

## 2018-03-29 ENCOUNTER — RECORDS - HEALTHEAST (OUTPATIENT)
Dept: ADMINISTRATIVE | Facility: OTHER | Age: 83
End: 2018-03-29

## 2018-03-29 ENCOUNTER — COMMUNICATION - HEALTHEAST (OUTPATIENT)
Dept: INTERNAL MEDICINE | Facility: CLINIC | Age: 83
End: 2018-03-29

## 2018-03-29 ENCOUNTER — OFFICE VISIT - HEALTHEAST (OUTPATIENT)
Dept: INTERNAL MEDICINE | Facility: CLINIC | Age: 83
End: 2018-03-29

## 2018-03-29 DIAGNOSIS — R29.3 POSTURAL INSTABILITY: ICD-10-CM

## 2018-03-30 ENCOUNTER — AMBULATORY - HEALTHEAST (OUTPATIENT)
Dept: NURSING | Facility: CLINIC | Age: 83
End: 2018-03-30

## 2018-04-02 ENCOUNTER — COMMUNICATION - HEALTHEAST (OUTPATIENT)
Dept: INTERNAL MEDICINE | Facility: CLINIC | Age: 83
End: 2018-04-02

## 2018-04-19 ENCOUNTER — AMBULATORY - HEALTHEAST (OUTPATIENT)
Dept: INTERNAL MEDICINE | Facility: CLINIC | Age: 83
End: 2018-04-19

## 2018-04-19 DIAGNOSIS — M81.0 OSTEOPOROSIS: ICD-10-CM

## 2018-04-26 ENCOUNTER — AMBULATORY - HEALTHEAST (OUTPATIENT)
Dept: NURSING | Facility: CLINIC | Age: 83
End: 2018-04-26

## 2018-04-29 ENCOUNTER — COMMUNICATION - HEALTHEAST (OUTPATIENT)
Dept: INTERNAL MEDICINE | Facility: CLINIC | Age: 83
End: 2018-04-29

## 2018-04-29 ENCOUNTER — COMMUNICATION - HEALTHEAST (OUTPATIENT)
Dept: CARDIOLOGY | Facility: CLINIC | Age: 83
End: 2018-04-29

## 2018-04-29 DIAGNOSIS — I48.0 PAROXYSMAL ATRIAL FIBRILLATION (H): ICD-10-CM

## 2018-04-29 DIAGNOSIS — E03.9 HYPOTHYROIDISM: ICD-10-CM

## 2018-04-30 ENCOUNTER — RECORDS - HEALTHEAST (OUTPATIENT)
Dept: ADMINISTRATIVE | Facility: OTHER | Age: 83
End: 2018-04-30

## 2018-04-30 ENCOUNTER — OFFICE VISIT - HEALTHEAST (OUTPATIENT)
Dept: PULMONOLOGY | Facility: OTHER | Age: 83
End: 2018-04-30

## 2018-04-30 ENCOUNTER — RECORDS - HEALTHEAST (OUTPATIENT)
Dept: PULMONOLOGY | Facility: OTHER | Age: 83
End: 2018-04-30

## 2018-04-30 DIAGNOSIS — R93.89 ABNORMAL CXR: ICD-10-CM

## 2018-04-30 DIAGNOSIS — R94.2 DIFFUSION CAPACITY OF LUNG (DL), DECREASED: ICD-10-CM

## 2018-04-30 DIAGNOSIS — G47.33 OSA (OBSTRUCTIVE SLEEP APNEA): ICD-10-CM

## 2018-04-30 DIAGNOSIS — R06.09 DYSPNEA ON EXERTION: ICD-10-CM

## 2018-04-30 DIAGNOSIS — I50.30 HEART FAILURE WITH PRESERVED EJECTION FRACTION (H): ICD-10-CM

## 2018-04-30 DIAGNOSIS — R06.02 SHORTNESS OF BREATH: ICD-10-CM

## 2018-04-30 LAB — HGB BLD-MCNC: 11.6 G/DL

## 2018-04-30 ASSESSMENT — MIFFLIN-ST. JEOR: SCORE: 1133.09

## 2018-05-08 ENCOUNTER — HOSPITAL ENCOUNTER (OUTPATIENT)
Dept: CT IMAGING | Facility: HOSPITAL | Age: 83
Discharge: HOME | End: 2018-05-08
Attending: INTERNAL MEDICINE

## 2018-05-08 ENCOUNTER — COMMUNICATION - HEALTHEAST (OUTPATIENT)
Dept: PULMONOLOGY | Facility: OTHER | Age: 83
End: 2018-05-08

## 2018-05-08 DIAGNOSIS — R94.2 DIFFUSION CAPACITY OF LUNG (DL), DECREASED: ICD-10-CM

## 2018-05-08 DIAGNOSIS — R06.09 DYSPNEA ON EXERTION: ICD-10-CM

## 2018-05-08 DIAGNOSIS — R06.09 OTHER FORMS OF DYSPNEA: ICD-10-CM

## 2018-05-08 DIAGNOSIS — R93.89 ABNORMAL CXR: ICD-10-CM

## 2018-06-07 ENCOUNTER — COMMUNICATION - HEALTHEAST (OUTPATIENT)
Dept: CARE COORDINATION | Facility: CLINIC | Age: 83
End: 2018-06-07

## 2018-06-12 ENCOUNTER — OFFICE VISIT - HEALTHEAST (OUTPATIENT)
Dept: CARDIOLOGY | Facility: CLINIC | Age: 83
End: 2018-06-12

## 2018-06-12 DIAGNOSIS — I50.30 HEART FAILURE WITH PRESERVED EJECTION FRACTION (H): ICD-10-CM

## 2018-06-12 DIAGNOSIS — I48.0 PAROXYSMAL ATRIAL FIBRILLATION (H): ICD-10-CM

## 2018-06-12 DIAGNOSIS — I10 ESSENTIAL HYPERTENSION WITH GOAL BLOOD PRESSURE LESS THAN 140/90: ICD-10-CM

## 2018-06-13 ENCOUNTER — RECORDS - HEALTHEAST (OUTPATIENT)
Dept: MAMMOGRAPHY | Facility: CLINIC | Age: 83
End: 2018-06-13

## 2018-06-13 DIAGNOSIS — Z12.31 ENCOUNTER FOR SCREENING MAMMOGRAM FOR MALIGNANT NEOPLASM OF BREAST: ICD-10-CM

## 2018-06-14 ENCOUNTER — AMBULATORY - HEALTHEAST (OUTPATIENT)
Dept: CARE COORDINATION | Facility: CLINIC | Age: 83
End: 2018-06-14

## 2018-06-17 ENCOUNTER — COMMUNICATION - HEALTHEAST (OUTPATIENT)
Dept: CARDIOLOGY | Facility: CLINIC | Age: 83
End: 2018-06-17

## 2018-06-17 DIAGNOSIS — I48.0 PAROXYSMAL ATRIAL FIBRILLATION (H): ICD-10-CM

## 2018-06-29 ENCOUNTER — OFFICE VISIT - HEALTHEAST (OUTPATIENT)
Dept: PULMONOLOGY | Facility: OTHER | Age: 83
End: 2018-06-29

## 2018-06-29 DIAGNOSIS — I50.30 HEART FAILURE WITH PRESERVED EJECTION FRACTION (H): ICD-10-CM

## 2018-06-29 DIAGNOSIS — R06.09 DYSPNEA ON EXERTION: ICD-10-CM

## 2018-06-29 DIAGNOSIS — R91.1 LUNG NODULE: ICD-10-CM

## 2018-07-02 ENCOUNTER — COMMUNICATION - HEALTHEAST (OUTPATIENT)
Dept: INTERNAL MEDICINE | Facility: CLINIC | Age: 83
End: 2018-07-02

## 2018-07-02 DIAGNOSIS — K21.9 ESOPHAGEAL REFLUX: ICD-10-CM

## 2018-07-06 ENCOUNTER — COMMUNICATION - HEALTHEAST (OUTPATIENT)
Dept: PULMONOLOGY | Facility: OTHER | Age: 83
End: 2018-07-06

## 2018-07-09 ENCOUNTER — AMBULATORY - HEALTHEAST (OUTPATIENT)
Dept: PULMONOLOGY | Facility: OTHER | Age: 83
End: 2018-07-09

## 2018-07-09 DIAGNOSIS — R06.09 DYSPNEA ON EXERTION: ICD-10-CM

## 2018-07-09 LAB
ATRIAL RATE - MUSE: 72 BPM
DIASTOLIC BLOOD PRESSURE - MUSE: NORMAL MMHG
INTERPRETATION ECG - MUSE: NORMAL
P AXIS - MUSE: 85 DEGREES
PR INTERVAL - MUSE: 200 MS
QRS DURATION - MUSE: 88 MS
QT - MUSE: 448 MS
QTC - MUSE: 490 MS
R AXIS - MUSE: 8 DEGREES
SYSTOLIC BLOOD PRESSURE - MUSE: NORMAL MMHG
T AXIS - MUSE: 4 DEGREES
VENTRICULAR RATE- MUSE: 72 BPM

## 2018-07-11 ENCOUNTER — COMMUNICATION - HEALTHEAST (OUTPATIENT)
Dept: CARE COORDINATION | Facility: CLINIC | Age: 83
End: 2018-07-11

## 2018-07-20 ENCOUNTER — RECORDS - HEALTHEAST (OUTPATIENT)
Dept: ADMINISTRATIVE | Facility: OTHER | Age: 83
End: 2018-07-20

## 2018-08-18 ENCOUNTER — COMMUNICATION - HEALTHEAST (OUTPATIENT)
Dept: INTERNAL MEDICINE | Facility: CLINIC | Age: 83
End: 2018-08-18

## 2018-08-18 DIAGNOSIS — F32.9 REACTIVE DEPRESSION: ICD-10-CM

## 2018-08-27 ENCOUNTER — OFFICE VISIT - HEALTHEAST (OUTPATIENT)
Dept: CARDIOLOGY | Facility: CLINIC | Age: 83
End: 2018-08-27

## 2018-08-27 DIAGNOSIS — I10 ESSENTIAL HYPERTENSION: ICD-10-CM

## 2018-08-27 DIAGNOSIS — I48.0 PAROXYSMAL ATRIAL FIBRILLATION (H): ICD-10-CM

## 2018-08-27 LAB
ATRIAL RATE - MUSE: 71 BPM
DIASTOLIC BLOOD PRESSURE - MUSE: NORMAL MMHG
INTERPRETATION ECG - MUSE: NORMAL
P AXIS - MUSE: 64 DEGREES
PR INTERVAL - MUSE: 188 MS
QRS DURATION - MUSE: 80 MS
QT - MUSE: 452 MS
QTC - MUSE: 491 MS
R AXIS - MUSE: 1 DEGREES
SYSTOLIC BLOOD PRESSURE - MUSE: NORMAL MMHG
T AXIS - MUSE: 3 DEGREES
VENTRICULAR RATE- MUSE: 71 BPM

## 2018-08-27 ASSESSMENT — MIFFLIN-ST. JEOR: SCORE: 1133.54

## 2018-09-10 ENCOUNTER — COMMUNICATION - HEALTHEAST (OUTPATIENT)
Dept: INTERNAL MEDICINE | Facility: CLINIC | Age: 83
End: 2018-09-10

## 2018-09-10 DIAGNOSIS — D50.9 IRON DEFICIENCY ANEMIA: ICD-10-CM

## 2018-10-31 ENCOUNTER — AMBULATORY - HEALTHEAST (OUTPATIENT)
Dept: NURSING | Facility: CLINIC | Age: 83
End: 2018-10-31

## 2018-10-31 DIAGNOSIS — Z23 FLU VACCINE NEED: ICD-10-CM

## 2018-12-22 ENCOUNTER — COMMUNICATION - HEALTHEAST (OUTPATIENT)
Dept: CARDIOLOGY | Facility: CLINIC | Age: 83
End: 2018-12-22

## 2018-12-22 DIAGNOSIS — I48.0 PAROXYSMAL ATRIAL FIBRILLATION (H): ICD-10-CM

## 2019-01-07 ENCOUNTER — COMMUNICATION - HEALTHEAST (OUTPATIENT)
Dept: INTERNAL MEDICINE | Facility: CLINIC | Age: 84
End: 2019-01-07

## 2019-01-07 ENCOUNTER — OFFICE VISIT - HEALTHEAST (OUTPATIENT)
Dept: INTERNAL MEDICINE | Facility: CLINIC | Age: 84
End: 2019-01-07

## 2019-01-07 DIAGNOSIS — I77.810 ASCENDING AORTA DILATION (H): ICD-10-CM
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
## 2019-01-07 ASSESSMENT — MIFFLIN-ST. JEOR: SCORE: 1110.86

## 2019-01-09 ENCOUNTER — COMMUNICATION - HEALTHEAST (OUTPATIENT)
Dept: CARDIOLOGY | Facility: CLINIC | Age: 84
End: 2019-01-09

## 2019-01-14 ENCOUNTER — COMMUNICATION - HEALTHEAST (OUTPATIENT)
Dept: CARDIOLOGY | Facility: CLINIC | Age: 84
End: 2019-01-14

## 2019-01-14 DIAGNOSIS — I50.30 HEART FAILURE WITH PRESERVED EJECTION FRACTION (H): ICD-10-CM

## 2019-01-16 ENCOUNTER — AMBULATORY - HEALTHEAST (OUTPATIENT)
Dept: CARE COORDINATION | Facility: CLINIC | Age: 84
End: 2019-01-16

## 2019-02-02 ENCOUNTER — COMMUNICATION - HEALTHEAST (OUTPATIENT)
Dept: INTERNAL MEDICINE | Facility: CLINIC | Age: 84
End: 2019-02-02

## 2019-02-02 DIAGNOSIS — D50.9 IRON DEFICIENCY ANEMIA: ICD-10-CM

## 2019-02-13 ENCOUNTER — RECORDS - HEALTHEAST (OUTPATIENT)
Dept: ADMINISTRATIVE | Facility: OTHER | Age: 84
End: 2019-02-13

## 2019-02-24 ENCOUNTER — COMMUNICATION - HEALTHEAST (OUTPATIENT)
Dept: INTERNAL MEDICINE | Facility: CLINIC | Age: 84
End: 2019-02-24

## 2019-02-24 DIAGNOSIS — F32.9 REACTIVE DEPRESSION: ICD-10-CM

## 2019-04-15 ENCOUNTER — COMMUNICATION - HEALTHEAST (OUTPATIENT)
Dept: SCHEDULING | Facility: CLINIC | Age: 84
End: 2019-04-15

## 2019-04-29 ENCOUNTER — OFFICE VISIT - HEALTHEAST (OUTPATIENT)
Dept: INTERNAL MEDICINE | Facility: CLINIC | Age: 84
End: 2019-04-29

## 2019-04-29 ENCOUNTER — AMBULATORY - HEALTHEAST (OUTPATIENT)
Dept: INTERNAL MEDICINE | Facility: CLINIC | Age: 84
End: 2019-04-29

## 2019-04-29 DIAGNOSIS — M81.0 OSTEOPOROSIS: ICD-10-CM

## 2019-04-29 DIAGNOSIS — E89.0 POSTABLATIVE HYPOTHYROIDISM: ICD-10-CM

## 2019-04-29 DIAGNOSIS — Z51.81 MEDICATION MONITORING ENCOUNTER: ICD-10-CM

## 2019-04-29 DIAGNOSIS — M81.0 AGE-RELATED OSTEOPOROSIS WITHOUT CURRENT PATHOLOGICAL FRACTURE: ICD-10-CM

## 2019-04-29 DIAGNOSIS — D50.9 IRON DEFICIENCY ANEMIA, UNSPECIFIED IRON DEFICIENCY ANEMIA TYPE: ICD-10-CM

## 2019-04-29 DIAGNOSIS — E78.00 HYPERCHOLESTEROLEMIA: ICD-10-CM

## 2019-04-29 DIAGNOSIS — Z00.00 HEALTH CARE MAINTENANCE: ICD-10-CM

## 2019-04-29 LAB
ALBUMIN SERPL-MCNC: 4 G/DL (ref 3.5–5)
ALP SERPL-CCNC: 55 U/L (ref 45–120)
ALT SERPL W P-5'-P-CCNC: 16 U/L (ref 0–45)
ANION GAP SERPL CALCULATED.3IONS-SCNC: 12 MMOL/L (ref 5–18)
AST SERPL W P-5'-P-CCNC: 16 U/L (ref 0–40)
BILIRUB SERPL-MCNC: 0.9 MG/DL (ref 0–1)
BUN SERPL-MCNC: 16 MG/DL (ref 8–28)
CALCIUM SERPL-MCNC: 9.5 MG/DL (ref 8.5–10.5)
CHLORIDE BLD-SCNC: 103 MMOL/L (ref 98–107)
CHOLEST SERPL-MCNC: 173 MG/DL
CO2 SERPL-SCNC: 23 MMOL/L (ref 22–31)
CREAT SERPL-MCNC: 0.8 MG/DL (ref 0.6–1.1)
ERYTHROCYTE [DISTWIDTH] IN BLOOD BY AUTOMATED COUNT: 11 % (ref 11–14.5)
FASTING STATUS PATIENT QL REPORTED: NO
FERRITIN SERPL-MCNC: 100 NG/ML (ref 10–130)
GFR SERPL CREATININE-BSD FRML MDRD: >60 ML/MIN/1.73M2
GLUCOSE BLD-MCNC: 96 MG/DL (ref 70–125)
HCT VFR BLD AUTO: 39.8 % (ref 35–47)
HDLC SERPL-MCNC: 47 MG/DL
HGB BLD-MCNC: 13.2 G/DL (ref 12–16)
LDLC SERPL CALC-MCNC: 88 MG/DL
MCH RBC QN AUTO: 30.8 PG (ref 27–34)
MCHC RBC AUTO-ENTMCNC: 33.2 G/DL (ref 32–36)
MCV RBC AUTO: 93 FL (ref 80–100)
PLATELET # BLD AUTO: 199 THOU/UL (ref 140–440)
PMV BLD AUTO: 7 FL (ref 7–10)
POTASSIUM BLD-SCNC: 4 MMOL/L (ref 3.5–5)
PROT SERPL-MCNC: 6.9 G/DL (ref 6–8)
RBC # BLD AUTO: 4.29 MILL/UL (ref 3.8–5.4)
SODIUM SERPL-SCNC: 138 MMOL/L (ref 136–145)
TRIGL SERPL-MCNC: 192 MG/DL
TSH SERPL DL<=0.005 MIU/L-ACNC: 1.94 UIU/ML (ref 0.3–5)
WBC: 6.4 THOU/UL (ref 4–11)

## 2019-04-29 ASSESSMENT — MIFFLIN-ST. JEOR: SCORE: 1142.61

## 2019-04-30 ENCOUNTER — COMMUNICATION - HEALTHEAST (OUTPATIENT)
Dept: INTERNAL MEDICINE | Facility: CLINIC | Age: 84
End: 2019-04-30

## 2019-05-01 ENCOUNTER — AMBULATORY - HEALTHEAST (OUTPATIENT)
Dept: NURSING | Facility: CLINIC | Age: 84
End: 2019-05-01

## 2019-05-01 DIAGNOSIS — M89.9 DISORDER OF BONE AND CARTILAGE: ICD-10-CM

## 2019-05-01 DIAGNOSIS — M94.9 DISORDER OF BONE AND CARTILAGE: ICD-10-CM

## 2019-05-08 ENCOUNTER — COMMUNICATION - HEALTHEAST (OUTPATIENT)
Dept: INTERNAL MEDICINE | Facility: CLINIC | Age: 84
End: 2019-05-08

## 2019-05-13 ENCOUNTER — COMMUNICATION - HEALTHEAST (OUTPATIENT)
Dept: INTERNAL MEDICINE | Facility: CLINIC | Age: 84
End: 2019-05-13

## 2019-05-13 DIAGNOSIS — E03.9 HYPOTHYROIDISM: ICD-10-CM

## 2019-06-07 ENCOUNTER — COMMUNICATION - HEALTHEAST (OUTPATIENT)
Dept: CARDIOLOGY | Facility: CLINIC | Age: 84
End: 2019-06-07

## 2019-06-07 DIAGNOSIS — I48.0 PAROXYSMAL ATRIAL FIBRILLATION (H): ICD-10-CM

## 2019-06-10 ENCOUNTER — COMMUNICATION - HEALTHEAST (OUTPATIENT)
Dept: INTERNAL MEDICINE | Facility: CLINIC | Age: 84
End: 2019-06-10

## 2019-06-10 DIAGNOSIS — E78.5 HYPERLIPIDEMIA: ICD-10-CM

## 2019-06-28 ENCOUNTER — AMBULATORY - HEALTHEAST (OUTPATIENT)
Dept: PULMONOLOGY | Facility: OTHER | Age: 84
End: 2019-06-28

## 2019-06-28 ENCOUNTER — COMMUNICATION - HEALTHEAST (OUTPATIENT)
Dept: PULMONOLOGY | Facility: OTHER | Age: 84
End: 2019-06-28

## 2019-06-28 DIAGNOSIS — R91.8 PULMONARY NODULES: ICD-10-CM

## 2019-07-10 ENCOUNTER — HOSPITAL ENCOUNTER (OUTPATIENT)
Dept: CT IMAGING | Facility: HOSPITAL | Age: 84
Discharge: HOME | End: 2019-07-10
Attending: INTERNAL MEDICINE

## 2019-07-10 DIAGNOSIS — R91.8 PULMONARY NODULES: ICD-10-CM

## 2019-07-15 ENCOUNTER — COMMUNICATION - HEALTHEAST (OUTPATIENT)
Dept: INTERNAL MEDICINE | Facility: CLINIC | Age: 84
End: 2019-07-15

## 2019-07-15 DIAGNOSIS — K21.9 ESOPHAGEAL REFLUX: ICD-10-CM

## 2019-07-22 ENCOUNTER — COMMUNICATION - HEALTHEAST (OUTPATIENT)
Dept: INTERNAL MEDICINE | Facility: CLINIC | Age: 84
End: 2019-07-22

## 2019-07-22 DIAGNOSIS — K21.9 ESOPHAGEAL REFLUX: ICD-10-CM

## 2019-07-24 ENCOUNTER — COMMUNICATION - HEALTHEAST (OUTPATIENT)
Dept: ADMINISTRATIVE | Facility: CLINIC | Age: 84
End: 2019-07-24

## 2019-08-06 ENCOUNTER — COMMUNICATION - HEALTHEAST (OUTPATIENT)
Dept: INTERNAL MEDICINE | Facility: CLINIC | Age: 84
End: 2019-08-06

## 2019-08-12 ENCOUNTER — OFFICE VISIT - HEALTHEAST (OUTPATIENT)
Dept: INTERNAL MEDICINE | Facility: CLINIC | Age: 84
End: 2019-08-12

## 2019-08-12 DIAGNOSIS — I63.81 RIGHT-SIDED LACUNAR INFARCTION (H): ICD-10-CM

## 2019-08-12 DIAGNOSIS — T50.2X5A DIURETIC-INDUCED HYPOKALEMIA: ICD-10-CM

## 2019-08-12 DIAGNOSIS — E03.9 HYPOTHYROIDISM: ICD-10-CM

## 2019-08-12 DIAGNOSIS — I48.0 PAROXYSMAL ATRIAL FIBRILLATION (H): ICD-10-CM

## 2019-08-12 DIAGNOSIS — Z87.820 SIGNIFICANT CLOSED HEAD TRAUMA WITHIN PAST 3 MONTHS: ICD-10-CM

## 2019-08-12 DIAGNOSIS — E87.6 DIURETIC-INDUCED HYPOKALEMIA: ICD-10-CM

## 2019-08-12 DIAGNOSIS — K21.9 ESOPHAGEAL REFLUX: ICD-10-CM

## 2019-08-12 DIAGNOSIS — I50.32 CHRONIC HEART FAILURE WITH PRESERVED EJECTION FRACTION (H): ICD-10-CM

## 2019-08-12 RX ORDER — POTASSIUM CHLORIDE 1500 MG/1
20 TABLET, EXTENDED RELEASE ORAL DAILY
Qty: 90 TABLET | Refills: 3 | Status: SHIPPED | OUTPATIENT
Start: 2019-08-12

## 2019-08-12 RX ORDER — LATANOPROST 50 UG/ML
1 SOLUTION/ DROPS OPHTHALMIC AT BEDTIME
Refills: 12 | Status: SHIPPED | COMMUNITY
Start: 2019-07-25

## 2019-08-12 ASSESSMENT — MIFFLIN-ST. JEOR: SCORE: 1115.4

## 2019-08-23 ENCOUNTER — AMBULATORY - HEALTHEAST (OUTPATIENT)
Dept: PULMONOLOGY | Facility: OTHER | Age: 84
End: 2019-08-23

## 2019-08-23 DIAGNOSIS — R06.09 DYSPNEA ON EXERTION: ICD-10-CM

## 2019-09-13 ENCOUNTER — RECORDS - HEALTHEAST (OUTPATIENT)
Dept: BONE DENSITY | Facility: CLINIC | Age: 84
End: 2019-09-13

## 2019-09-13 DIAGNOSIS — M81.0 AGE-RELATED OSTEOPOROSIS WITHOUT CURRENT PATHOLOGICAL FRACTURE: ICD-10-CM

## 2019-09-16 ENCOUNTER — COMMUNICATION - HEALTHEAST (OUTPATIENT)
Dept: INTERNAL MEDICINE | Facility: CLINIC | Age: 84
End: 2019-09-16

## 2019-10-21 ENCOUNTER — COMMUNICATION - HEALTHEAST (OUTPATIENT)
Dept: CARDIOLOGY | Facility: CLINIC | Age: 84
End: 2019-10-21

## 2019-10-21 ENCOUNTER — COMMUNICATION - HEALTHEAST (OUTPATIENT)
Dept: INTERNAL MEDICINE | Facility: CLINIC | Age: 84
End: 2019-10-21

## 2019-10-21 DIAGNOSIS — I48.0 PAROXYSMAL ATRIAL FIBRILLATION (H): ICD-10-CM

## 2019-11-05 ENCOUNTER — OFFICE VISIT - HEALTHEAST (OUTPATIENT)
Dept: INTERNAL MEDICINE | Facility: CLINIC | Age: 84
End: 2019-11-05

## 2019-11-05 DIAGNOSIS — Z12.31 VISIT FOR SCREENING MAMMOGRAM: ICD-10-CM

## 2019-11-05 DIAGNOSIS — I50.32 CHRONIC HEART FAILURE WITH PRESERVED EJECTION FRACTION (H): ICD-10-CM

## 2019-11-05 DIAGNOSIS — I48.0 PAROXYSMAL ATRIAL FIBRILLATION (H): ICD-10-CM

## 2019-11-05 DIAGNOSIS — Z51.81 ENCOUNTER FOR THERAPEUTIC DRUG MONITORING: ICD-10-CM

## 2019-11-05 DIAGNOSIS — I10 ESSENTIAL HYPERTENSION WITH GOAL BLOOD PRESSURE LESS THAN 140/90: ICD-10-CM

## 2019-11-05 DIAGNOSIS — M81.0 AGE-RELATED OSTEOPOROSIS WITHOUT CURRENT PATHOLOGICAL FRACTURE: ICD-10-CM

## 2019-11-05 LAB
ANION GAP SERPL CALCULATED.3IONS-SCNC: 9 MMOL/L (ref 5–18)
BUN SERPL-MCNC: 18 MG/DL (ref 8–28)
CALCIUM SERPL-MCNC: 9.4 MG/DL (ref 8.5–10.5)
CHLORIDE BLD-SCNC: 104 MMOL/L (ref 98–107)
CO2 SERPL-SCNC: 27 MMOL/L (ref 22–31)
CREAT SERPL-MCNC: 0.78 MG/DL (ref 0.6–1.1)
GFR SERPL CREATININE-BSD FRML MDRD: >60 ML/MIN/1.73M2
GLUCOSE BLD-MCNC: 99 MG/DL (ref 70–125)
POTASSIUM BLD-SCNC: 4.5 MMOL/L (ref 3.5–5)
SODIUM SERPL-SCNC: 140 MMOL/L (ref 136–145)

## 2019-11-05 ASSESSMENT — MIFFLIN-ST. JEOR: SCORE: 1129.01

## 2019-11-11 ENCOUNTER — RECORDS - HEALTHEAST (OUTPATIENT)
Dept: MAMMOGRAPHY | Facility: CLINIC | Age: 84
End: 2019-11-11

## 2019-11-11 DIAGNOSIS — Z12.31 ENCOUNTER FOR SCREENING MAMMOGRAM FOR MALIGNANT NEOPLASM OF BREAST: ICD-10-CM

## 2019-12-09 ENCOUNTER — COMMUNICATION - HEALTHEAST (OUTPATIENT)
Dept: INTERNAL MEDICINE | Facility: CLINIC | Age: 84
End: 2019-12-09

## 2019-12-09 ENCOUNTER — COMMUNICATION - HEALTHEAST (OUTPATIENT)
Dept: SCHEDULING | Facility: CLINIC | Age: 84
End: 2019-12-09

## 2019-12-09 DIAGNOSIS — E87.6 DIURETIC-INDUCED HYPOKALEMIA: ICD-10-CM

## 2019-12-09 DIAGNOSIS — T50.2X5A DIURETIC-INDUCED HYPOKALEMIA: ICD-10-CM

## 2019-12-16 ENCOUNTER — COMMUNICATION - HEALTHEAST (OUTPATIENT)
Dept: SCHEDULING | Facility: CLINIC | Age: 84
End: 2019-12-16

## 2019-12-17 ENCOUNTER — OFFICE VISIT - HEALTHEAST (OUTPATIENT)
Dept: FAMILY MEDICINE | Facility: CLINIC | Age: 84
End: 2019-12-17

## 2019-12-17 DIAGNOSIS — R21 RASH: ICD-10-CM

## 2019-12-17 ASSESSMENT — MIFFLIN-ST. JEOR: SCORE: 1130.82

## 2019-12-30 ENCOUNTER — COMMUNICATION - HEALTHEAST (OUTPATIENT)
Dept: CARE COORDINATION | Facility: CLINIC | Age: 84
End: 2019-12-30

## 2019-12-31 ENCOUNTER — OFFICE VISIT - HEALTHEAST (OUTPATIENT)
Dept: INTERNAL MEDICINE | Facility: CLINIC | Age: 84
End: 2019-12-31

## 2019-12-31 DIAGNOSIS — I10 ESSENTIAL HYPERTENSION WITH GOAL BLOOD PRESSURE LESS THAN 140/90: ICD-10-CM

## 2019-12-31 DIAGNOSIS — R07.9 CHEST PAIN, UNSPECIFIED TYPE: ICD-10-CM

## 2019-12-31 DIAGNOSIS — K21.9 GASTROESOPHAGEAL REFLUX DISEASE WITHOUT ESOPHAGITIS: ICD-10-CM

## 2019-12-31 DIAGNOSIS — I50.32 CHRONIC HEART FAILURE WITH PRESERVED EJECTION FRACTION (H): ICD-10-CM

## 2019-12-31 ASSESSMENT — PATIENT HEALTH QUESTIONNAIRE - PHQ9: SUM OF ALL RESPONSES TO PHQ QUESTIONS 1-9: 0

## 2019-12-31 ASSESSMENT — MIFFLIN-ST. JEOR: SCORE: 1129

## 2020-01-03 ENCOUNTER — OFFICE VISIT - HEALTHEAST (OUTPATIENT)
Dept: CARDIOLOGY | Facility: CLINIC | Age: 85
End: 2020-01-03

## 2020-01-03 DIAGNOSIS — I35.0 NONRHEUMATIC AORTIC VALVE STENOSIS: ICD-10-CM

## 2020-01-03 DIAGNOSIS — I10 ESSENTIAL HYPERTENSION: ICD-10-CM

## 2020-01-03 DIAGNOSIS — I05.0 MITRAL VALVE STENOSIS, UNSPECIFIED ETIOLOGY: ICD-10-CM

## 2020-01-03 DIAGNOSIS — I48.0 PAROXYSMAL ATRIAL FIBRILLATION (H): ICD-10-CM

## 2020-01-03 ASSESSMENT — MIFFLIN-ST. JEOR: SCORE: 1138.08

## 2020-02-17 ENCOUNTER — COMMUNICATION - HEALTHEAST (OUTPATIENT)
Dept: CARDIOLOGY | Facility: CLINIC | Age: 85
End: 2020-02-17

## 2020-02-17 DIAGNOSIS — I50.9 CHF (CONGESTIVE HEART FAILURE) (H): ICD-10-CM

## 2020-02-17 RX ORDER — FUROSEMIDE 40 MG
80 TABLET ORAL DAILY
Qty: 180 TABLET | Refills: 2 | Status: SHIPPED | OUTPATIENT
Start: 2020-02-17 | End: 2021-08-31

## 2020-03-10 ENCOUNTER — COMMUNICATION - HEALTHEAST (OUTPATIENT)
Dept: INTERNAL MEDICINE | Facility: CLINIC | Age: 85
End: 2020-03-10

## 2020-03-10 DIAGNOSIS — K21.9 ESOPHAGEAL REFLUX: ICD-10-CM

## 2020-04-06 ENCOUNTER — AMBULATORY - HEALTHEAST (OUTPATIENT)
Dept: INTENSIVE CARE | Facility: CLINIC | Age: 85
End: 2020-04-06

## 2020-04-06 DIAGNOSIS — R91.8 PULMONARY NODULES: ICD-10-CM

## 2020-05-26 ENCOUNTER — COMMUNICATION - HEALTHEAST (OUTPATIENT)
Dept: INTERNAL MEDICINE | Facility: CLINIC | Age: 85
End: 2020-05-26

## 2020-05-26 DIAGNOSIS — F32.9 REACTIVE DEPRESSION: ICD-10-CM

## 2020-05-28 RX ORDER — CITALOPRAM HYDROBROMIDE 20 MG/1
TABLET ORAL
Qty: 90 TABLET | Refills: 2 | Status: SHIPPED | OUTPATIENT
Start: 2020-05-28

## 2020-07-14 ENCOUNTER — OFFICE VISIT - HEALTHEAST (OUTPATIENT)
Dept: INTERNAL MEDICINE | Facility: CLINIC | Age: 85
End: 2020-07-14

## 2020-07-14 ENCOUNTER — COMMUNICATION - HEALTHEAST (OUTPATIENT)
Dept: SCHEDULING | Facility: CLINIC | Age: 85
End: 2020-07-14

## 2020-07-14 DIAGNOSIS — Z20.822 SUSPECTED COVID-19 VIRUS INFECTION: ICD-10-CM

## 2020-07-14 ASSESSMENT — PATIENT HEALTH QUESTIONNAIRE - PHQ9: SUM OF ALL RESPONSES TO PHQ QUESTIONS 1-9: 0

## 2020-07-15 ENCOUNTER — COMMUNICATION - HEALTHEAST (OUTPATIENT)
Dept: INTERNAL MEDICINE | Facility: CLINIC | Age: 85
End: 2020-07-15

## 2020-07-17 ENCOUNTER — AMBULATORY - HEALTHEAST (OUTPATIENT)
Dept: FAMILY MEDICINE | Facility: CLINIC | Age: 85
End: 2020-07-17

## 2020-07-17 DIAGNOSIS — Z20.822 SUSPECTED COVID-19 VIRUS INFECTION: ICD-10-CM

## 2020-07-23 ENCOUNTER — COMMUNICATION - HEALTHEAST (OUTPATIENT)
Dept: FAMILY MEDICINE | Facility: CLINIC | Age: 85
End: 2020-07-23

## 2020-08-26 ENCOUNTER — COMMUNICATION - HEALTHEAST (OUTPATIENT)
Dept: INTERNAL MEDICINE | Facility: CLINIC | Age: 85
End: 2020-08-26

## 2020-08-26 DIAGNOSIS — E03.9 HYPOTHYROIDISM: ICD-10-CM

## 2020-08-27 ENCOUNTER — COMMUNICATION - HEALTHEAST (OUTPATIENT)
Dept: INTERNAL MEDICINE | Facility: CLINIC | Age: 85
End: 2020-08-27

## 2020-08-28 RX ORDER — LEVOTHYROXINE SODIUM 75 UG/1
TABLET ORAL
Qty: 90 TABLET | Refills: 3 | Status: SHIPPED | OUTPATIENT
Start: 2020-08-28

## 2020-09-10 ENCOUNTER — AMBULATORY - HEALTHEAST (OUTPATIENT)
Dept: PULMONOLOGY | Facility: OTHER | Age: 85
End: 2020-09-10

## 2020-09-10 DIAGNOSIS — R06.09 DOE (DYSPNEA ON EXERTION): ICD-10-CM

## 2020-09-22 ENCOUNTER — COMMUNICATION - HEALTHEAST (OUTPATIENT)
Dept: CARDIOLOGY | Facility: CLINIC | Age: 85
End: 2020-09-22

## 2020-09-22 DIAGNOSIS — I48.0 PAROXYSMAL ATRIAL FIBRILLATION (H): ICD-10-CM

## 2020-10-05 ENCOUNTER — COMMUNICATION - HEALTHEAST (OUTPATIENT)
Dept: INTERNAL MEDICINE | Facility: CLINIC | Age: 85
End: 2020-10-05

## 2020-10-05 DIAGNOSIS — E78.5 HYPERLIPIDEMIA: ICD-10-CM

## 2020-10-08 ENCOUNTER — COMMUNICATION - HEALTHEAST (OUTPATIENT)
Dept: PULMONOLOGY | Facility: OTHER | Age: 85
End: 2020-10-08

## 2020-10-08 RX ORDER — SIMVASTATIN 20 MG
TABLET ORAL
Qty: 90 TABLET | Refills: 2 | Status: SHIPPED | OUTPATIENT
Start: 2020-10-08 | End: 2022-01-18

## 2020-11-05 ENCOUNTER — COMMUNICATION - HEALTHEAST (OUTPATIENT)
Dept: PULMONOLOGY | Facility: OTHER | Age: 85
End: 2020-11-05

## 2020-12-15 ENCOUNTER — COMMUNICATION - HEALTHEAST (OUTPATIENT)
Dept: INTERNAL MEDICINE | Facility: CLINIC | Age: 85
End: 2020-12-15

## 2020-12-15 ENCOUNTER — COMMUNICATION - HEALTHEAST (OUTPATIENT)
Dept: CARDIOLOGY | Facility: CLINIC | Age: 85
End: 2020-12-15

## 2020-12-15 DIAGNOSIS — I48.0 PAROXYSMAL ATRIAL FIBRILLATION (H): ICD-10-CM

## 2020-12-16 RX ORDER — SOTALOL HYDROCHLORIDE 80 MG/1
80 TABLET ORAL 2 TIMES DAILY
Qty: 180 TABLET | Refills: 0 | Status: SHIPPED | OUTPATIENT
Start: 2020-12-16 | End: 2023-01-01

## 2021-01-05 ENCOUNTER — COMMUNICATION - HEALTHEAST (OUTPATIENT)
Dept: PULMONOLOGY | Facility: OTHER | Age: 86
End: 2021-01-05

## 2021-01-05 DIAGNOSIS — R06.09 DOE (DYSPNEA ON EXERTION): ICD-10-CM

## 2021-01-11 ENCOUNTER — HOSPITAL ENCOUNTER (OUTPATIENT)
Dept: CT IMAGING | Facility: HOSPITAL | Age: 86
Discharge: HOME | End: 2021-01-11
Attending: INTERNAL MEDICINE

## 2021-01-11 DIAGNOSIS — R91.8 PULMONARY NODULES: ICD-10-CM

## 2021-01-13 ENCOUNTER — COMMUNICATION - HEALTHEAST (OUTPATIENT)
Dept: PULMONOLOGY | Facility: OTHER | Age: 86
End: 2021-01-13

## 2021-01-21 ENCOUNTER — COMMUNICATION - HEALTHEAST (OUTPATIENT)
Dept: CARDIOLOGY | Facility: CLINIC | Age: 86
End: 2021-01-21

## 2021-01-22 ENCOUNTER — OFFICE VISIT - HEALTHEAST (OUTPATIENT)
Dept: CARDIOLOGY | Facility: CLINIC | Age: 86
End: 2021-01-22

## 2021-01-22 DIAGNOSIS — I48.0 PAROXYSMAL ATRIAL FIBRILLATION (H): ICD-10-CM

## 2021-01-22 DIAGNOSIS — I35.0 NONRHEUMATIC AORTIC VALVE STENOSIS: ICD-10-CM

## 2021-01-22 DIAGNOSIS — I77.89 AORTIC ROOT ENLARGEMENT (H): ICD-10-CM

## 2021-01-22 DIAGNOSIS — I05.0 MITRAL VALVE STENOSIS, UNSPECIFIED ETIOLOGY: ICD-10-CM

## 2021-01-22 DIAGNOSIS — I10 ESSENTIAL HYPERTENSION: ICD-10-CM

## 2021-01-22 ASSESSMENT — MIFFLIN-ST. JEOR: SCORE: 1165.29

## 2021-01-24 LAB
ATRIAL RATE - MUSE: 69 BPM
DIASTOLIC BLOOD PRESSURE - MUSE: NORMAL
INTERPRETATION ECG - MUSE: NORMAL
P AXIS - MUSE: 65 DEGREES
PR INTERVAL - MUSE: 194 MS
QRS DURATION - MUSE: 80 MS
QT - MUSE: 458 MS
QTC - MUSE: 490 MS
R AXIS - MUSE: -12 DEGREES
SYSTOLIC BLOOD PRESSURE - MUSE: NORMAL
T AXIS - MUSE: 0 DEGREES
VENTRICULAR RATE- MUSE: 69 BPM

## 2021-02-09 ENCOUNTER — COMMUNICATION - HEALTHEAST (OUTPATIENT)
Dept: CARDIOLOGY | Facility: CLINIC | Age: 86
End: 2021-02-09

## 2021-02-09 DIAGNOSIS — I48.0 PAROXYSMAL ATRIAL FIBRILLATION (H): ICD-10-CM

## 2021-02-11 ENCOUNTER — COMMUNICATION - HEALTHEAST (OUTPATIENT)
Dept: CARDIOLOGY | Facility: CLINIC | Age: 86
End: 2021-02-11

## 2021-02-11 DIAGNOSIS — I48.0 PAROXYSMAL ATRIAL FIBRILLATION (H): ICD-10-CM

## 2021-05-26 ENCOUNTER — RECORDS - HEALTHEAST (OUTPATIENT)
Dept: ADMINISTRATIVE | Facility: CLINIC | Age: 86
End: 2021-05-26

## 2021-05-26 ASSESSMENT — PATIENT HEALTH QUESTIONNAIRE - PHQ9: SUM OF ALL RESPONSES TO PHQ QUESTIONS 1-9: 0

## 2021-05-27 ASSESSMENT — PATIENT HEALTH QUESTIONNAIRE - PHQ9: SUM OF ALL RESPONSES TO PHQ QUESTIONS 1-9: 0

## 2021-05-28 NOTE — PROGRESS NOTES
The following steps were completed to comply with the REMS program for Prolia:  1. Ordering provider has previously reviewed information in the Medication Guide and Patient Counseling Chart, including the serious risks of Prolia  and the symptoms of each risk and have been advised  to seek prompt medical attention if they have signs or symptoms of any of the serious risks.  2. Provided each patient a copy of the Medication Guide and Patient Brochure.  See MAR for administration details.   Indication: Prolia  (denosumab) is a prescription medicine used to treat osteoporosis in patients who:   Are at high risk for fracture, meaning patients who have had a fracture related to osteoporosis, or who have multiple risk factors for fracture; Cannot use another osteoporosis medicine or other osteoporosis medicines did not work well.   The timeline for early/late injections would be 4 weeks early and any time after the 6 month nadira. If a patient receives their injection late, then the subsequent injection would be 6 months from the date that they actually received the injection    1.  When was the last injection?  10/30/2018    2.  Has insurance for this injection been verified?  Yes    3.  Did you experience any new onset achiness or rashes that lasted for over a month with your previous Prolia injection?   No    4.  Do you have a fever over 101?F or a new deep cough that is unusual for you today? No    5.  Have you started any new medications in the last 6 months that you were told could affect your immune system? These may have been prescribed by oncologist, transplant, rheumatology, or dermatology.   No    6.  In the last 6 months have you have gastric bypass or parathyroid surgery?   No    7.  Do you plan dental work requiring drilling into the bone such as implants/extractions or oral surgery in the next 2-3 months?   No

## 2021-05-28 NOTE — TELEPHONE ENCOUNTER
Refill Approved    Rx renewed per Medication Renewal Policy. Medication was last renewed on 4/30/18.    Maddie Medrano, [facility] Triage/Med Refill 5/14/2019     Requested Prescriptions   Pending Prescriptions Disp Refills     levothyroxine (SYNTHROID, LEVOTHROID) 75 MCG tablet [Pharmacy Med Name: LEVOTHYROXINE 0.075MG (75MCG) TABS] 90 tablet 0     Sig: TAKE 1 TABLET(75 MCG) BY MOUTH DAILY       Thyroid Hormones Protocol Passed - 5/13/2019  1:20 PM        Passed - Provider visit in past 12 months or next 3 months     Last office visit with prescriber/PCP: 4/29/2019 Brando Rodriguez MD OR same dept: 4/29/2019 Brando Rodriguez MD OR same specialty: 4/29/2019 Brando Rodriguez MD  Last physical: 2/28/2017 Last MTM visit: Visit date not found   Next visit within 3 mo: Visit date not found  Next physical within 3 mo: Visit date not found  Prescriber OR PCP: Brando Rodriguez MD  Last diagnosis associated with med order: 1. Hypothyroidism  - levothyroxine (SYNTHROID, LEVOTHROID) 75 MCG tablet [Pharmacy Med Name: LEVOTHYROXINE 0.075MG (75MCG) TABS]; TAKE 1 TABLET(75 MCG) BY MOUTH DAILY  Dispense: 90 tablet; Refill: 0    If protocol passes may refill for 12 months if within 3 months of last provider visit (or a total of 15 months).             Passed - TSH on file in past 12 months for patient age 12 & older     TSH   Date Value Ref Range Status   04/29/2019 1.94 0.30 - 5.00 uIU/mL Final

## 2021-05-28 NOTE — TELEPHONE ENCOUNTER
----- Message from Brando Rodriguez MD sent at 4/30/2019  8:08 AM CDT -----  Radha:  The ferritin, (a measure of iron stores), is in the normal range at 100.  You may stop the ferrous sulfate.  Your cholesterol is good with a total of 173 and an LDL fraction of 88.  Triglycerides are only accurately checked fasting.  The TSH, (thyroid stimulating hormone), is in the normal range at 1.94.  Continue your current levothyroxine dose.  Other labs including your potassium, blood sugar, hemoglobin, liver and kidney tests are normal.  It was nice to see you.    Brando Rodriguez MD

## 2021-05-28 NOTE — PROGRESS NOTES
ASSESSMENT:  1. Iron deficiency anemia, unspecified iron deficiency anemia type  Radha has been using ferrous sulfate for a prolonged period of time.  Ferritin and hemogram will be checked.  If the ferritin is in an acceptable range, her iron supplement will be stopped  - Ferritin  - HM2(CBC w/o Differential)    2. Medication monitoring encounter  Monitoring labs will be checked  - HM2(CBC w/o Differential)  - Comprehensive Metabolic Panel    3. Postablative hypothyroidism  She is on levothyroxine 75 mcg daily.  TSH will be checked  - Thyroid Stimulating Hormone (TSH)    4. Hypercholesterolemia  Treated with simvastatin 20 mg daily.  A nonfasting lipid cascade will be checked  - Lipid [place]    5. Health care maintenance  A pneumo 23 booster is advised  - Pneumococcal polysaccharide vaccine 23-valent 1 yo or older, subq/IM    6. Age-related osteoporosis without current pathological fracture  She is on Prolia with her next injection due May 1.  Bone density should be rechecked after 9/11/2019  - DXA Bone Density Scan; Future    7.  Health maintenance  Mammogram is due after June 13.  Shingrix is advised.  Check insurance    8.  Congestive heart failure with preserved ejection fraction:  Complains of shortness of breath with minor activity.  She is euvolemic and appears well compensated on exam.    9.  Adjustment disorder:  She remains on Celexa.  She feels her spirits are overall fairly good    10.  Mitral stenosis:  Moderate by echocardiogram in March 2018.  Periodic recheck is advised.    11.  Perceived memory loss:  Radha seems somewhat forgetful, feels that she is managing adequately at home.  Mini-Mental check would be recommended at next appointment    PLAN:  Patient Instructions   1.  Pneumo-23 booster today    2.  Mammogram due after 6/13    3.  Bone density recheck after 9/11.    4.  We will check insurance for Prolia.  Last injection was 10/31/18    5.  See me in six months or as needed.  Check Mini-Mental  at next appointment.  Also consider follow-up echocardiogram      Orders Placed This Encounter   Procedures     DXA Bone Density Scan     Standing Status:   Future     Standing Expiration Date:   10/29/2020     Order Specific Question:   Can the procedure be changed per Radiologist protocol?     Answer:   Yes     Pneumococcal polysaccharide vaccine 23-valent 3 yo or older, subq/IM     Ferritin     HM2(CBC w/o Differential)     Thyroid Stimulating Hormone (TSH)     Lipid Cascade     Order Specific Question:   Fasting is required?     Answer:   Unknown     Comprehensive Metabolic Panel     Medications Discontinued During This Encounter   Medication Reason     lidocaine (LIDODERM) 5 % Therapy completed     pantoprazole (PROTONIX) 40 MG tablet Duplicate order     furosemide (LASIX) 40 MG tablet Duplicate order     furosemide (LASIX) 40 MG tablet Duplicate order     ferrous sulfate 325 (65 FE) MG tablet Duplicate order       Return in about 6 months (around 10/29/2019) for Recheck.      ASSESSED PROBLEMS:  1. Iron deficiency anemia, unspecified iron deficiency anemia type  Ferritin    HM2(CBC w/o Differential)   2. Medication monitoring encounter  HM2(CBC w/o Differential)    Comprehensive Metabolic Panel   3. Postablative hypothyroidism  Thyroid Stimulating Hormone (TSH)   4. Hypercholesterolemia  Lipid Cascade   5. Health care maintenance  Pneumococcal polysaccharide vaccine 23-valent 3 yo or older, subq/IM   6. Age-related osteoporosis without current pathological fracture  DXA Bone Density Scan       CHIEF COMPLAINT:  Chief Complaint   Patient presents with     Medication Management     Discuss medications        HISTORY OF PRESENT ILLNESS:  Radha is a 83 y.o. female presenting to the clinic today for medication monitoring with shortness of breath on minimal exertion, diastolic heart failure, osteoporosis, memory impairment, and health maintenance.     Breathing: She is short of breath. Her ophthalmologist wanted her  "to ask is sotalol for her CHF could be contributing to her shortness of breath. She gets shortness of breath from just walking to the bathroom from her bed.     Diastolic heart failure: She is not getting much swelling of the ankles. Tolerating her medications well.     Osteoporosis: Evista used in the past. She would like to see how insurance coverage is for Prolia. The name prolia sounds familiar to the patient but she does not know why.     Memory impairment: She is noticing more and more difficulty with her memory. For example, she was unable to remember her appointments for the prolia shot she has had.     Health Maintenance: Pneumo 23 due. Shingrix part 2 due. DXA due. Mammogram due in     REVIEW OF SYSTEMS:   Denies medication side effects, depression or mood symptoms, peripheral edema, GERD/heartburn,   Admits to impaired memory, shortness of breath with minimal exertion.   All other systems are negative.    PFSH:  She has been taking Fe.   She sees an ophthalmologist regularly.   Reviewed as below.     TOBACCO USE:  Social History     Tobacco Use   Smoking Status Former Smoker     Packs/day: 0.50     Years: 20.00     Pack years: 10.00     Types: Cigarettes     Last attempt to quit: 1976     Years since quittin.3   Smokeless Tobacco Never Used   Tobacco Comment    no second hand smoke exposure       VITALS:  Vitals:    19 1508   BP: 128/70   Patient Site: Left Arm   Patient Position: Sitting   Cuff Size: Adult Regular   Pulse: 74   SpO2: 98%   Weight: 163 lb (73.9 kg)   Height: 5' 2\" (1.575 m)     Wt Readings from Last 3 Encounters:   19 163 lb (73.9 kg)   19 156 lb (70.8 kg)   19 155 lb (70.3 kg)     Body mass index is 29.81 kg/m .    PHYSICAL EXAM:  Constitutional:   Reveals an alert talkative elderly woman, mildly forgetful, affect appropriate, does not appear acutely ill.  Vitals: per nursing notes.  HEENT:  Ears:  External canals, TMs clear.    Eyes: no conjunctival " hyperemia,.  Oropharynx:   Mouth and throat clear, no thrush or exudate.  Neck:  Supple, no carotid bruits or lymphadenopathy.  Back:  No spine or CVA pain.  Thorax:  No bony deformities.  Lungs: Clear to A&P without rales or wheezes.  Respiratory effort normal.  Cardiac:   Regular rate and rhythm, normal S1, S2, no murmur or gallop.  Abdomen: right upper quadrant scar, Soft, active bowel sounds without bruits, mass, or tenderness.  Extremities:   No peripheral edema, joint change consistent with erosive osteoarthritis    Skin:  No jaundice, peripheral cyanosis or lesions to suggest malignancy.  Neuro: mildly forgetful, Alert and oriented. No gross focal deficits. Detailed exam not done.   Psychiatric:  Memory intact, mood appropriate.    ADDITIONAL HISTORY SUMMARIZED (2): 10/31/18 encounter note reviewed showing prolia administered. 3/29/18 progress note reviewed showing plan for osteoporosis management with prolia.   DECISION TO OBTAIN EXTRA INFORMATION (1): None.   RADIOLOGY TESTS (1): DXA 9/11/17 reviewed showing osteoporosis. Mammogram due in June, ordered today.   LABS (1): 1/4/19 labs reviewed and ordered labs today.   MEDICINE TESTS (1): None.  INDEPENDENT REVIEW (2 each): None.     The visit lasted a total of 25 minutes face to face with the patient. Over 50% of the time was spent counseling and educating the patient about shortness of breath on minimal exertion, diastolic heart failure, osteoporosis, memory impairment, and health maintenance.    I, Otto Lilly, am scribing for and in the presence of, Dr. Rodriguez.    I, Brando Rodriguez, personally performed the services described in this documentation, as scribed by Otto Lilly in my presence, and it is both accurate and complete.    Dragon dictation was used for this note.  Speech recognition errors are a possibility.    MEDICATIONS:  Current Outpatient Medications   Medication Sig Dispense Refill     acetaminophen (TYLENOL) 325 MG tablet Take 650 mg by  mouth every 6 (six) hours as needed for pain.       cholecalciferol, vitamin D3, (VITAMIN D3) 2,000 unit Tab Take 2,000 Units by mouth daily.        citalopram (CELEXA) 20 MG tablet TAKE 1 TABLET(20 MG) BY MOUTH DAILY 90 tablet 3     ferrous sulfate 325 (65 FE) MG tablet TAKE 1 TABLET(325 MG) BY MOUTH DAILY WITH BREAKFAST 90 tablet 3     fluticasone-vilanterol (BREO ELLIPTA) 100-25 mcg/dose DsDv inhaler Inhale 1 puff daily. 1 each 11     furosemide (LASIX) 40 MG tablet Take 80 mg by mouth every morning.       levothyroxine (SYNTHROID, LEVOTHROID) 75 MCG tablet Take 1 tablet (75 mcg total) by mouth daily. 90 tablet 3     pantoprazole (PROTONIX) 40 MG tablet TAKE 1 TABLET BY MOUTH DAILY 90 tablet 3     potassium chloride SA (K-DUR,KLOR-CON) 20 MEQ tablet Take 1 tablet (20 mEq total) by mouth daily. 90 tablet 3     rivaroxaban 20 mg Tab Take 1 tablet (20 mg total) by mouth daily with supper. 90 tablet 3     simvastatin (ZOCOR) 20 MG tablet Take 1 tablet (20 mg total) by mouth at bedtime. 90 tablet 3     sotalol (BETAPACE) 80 MG tablet TAKE 1 TABLET(80 MG) BY MOUTH TWICE DAILY 180 tablet 2     No current facility-administered medications for this visit.        Total data points: 4

## 2021-05-28 NOTE — PATIENT INSTRUCTIONS - HE
1.  Pneumo-23 booster today    2.  Mammogram due after 6/13    3.  Bone density recheck after 9/11.    4.  We will check insurance for Prolia.  Last injection was 10/31/18    5.  See me in six months or as needed

## 2021-05-29 ENCOUNTER — RECORDS - HEALTHEAST (OUTPATIENT)
Dept: ADMINISTRATIVE | Facility: CLINIC | Age: 86
End: 2021-05-29

## 2021-05-29 NOTE — TELEPHONE ENCOUNTER
Refill Approved    Rx renewed per Medication Renewal Policy. Medication was last renewed on 3/26/18.    Maddie Medrano, [facility] Triage/Med Refill 6/11/2019     Requested Prescriptions   Pending Prescriptions Disp Refills     simvastatin (ZOCOR) 20 MG tablet [Pharmacy Med Name: SIMVASTATIN 20MG TABLETS] 90 tablet 0     Sig: TAKE 1 TABLET(20 MG) BY MOUTH AT BEDTIME       Statins Refill Protocol (Hmg CoA Reductase Inhibitors) Passed - 6/10/2019  9:35 AM        Passed - PCP or prescribing provider visit in past 12 months      Last office visit with prescriber/PCP: 4/29/2019 Brando Rodriguez MD OR same dept: 4/29/2019 Brando Rodriguez MD OR same specialty: 4/29/2019 Brando Rodriguez MD  Last physical: 2/28/2017 Last MTM visit: Visit date not found   Next visit within 3 mo: Visit date not found  Next physical within 3 mo: Visit date not found  Prescriber OR PCP: Brando Rodriguez MD  Last diagnosis associated with med order: There are no diagnoses linked to this encounter.  If protocol passes may refill for 12 months if within 3 months of last provider visit (or a total of 15 months).

## 2021-05-29 NOTE — TELEPHONE ENCOUNTER
She should be continued on Xarelto.  I am not sure why this was declined.  She was just seen in April.

## 2021-05-30 ENCOUNTER — COMMUNICATION - HEALTHEAST (OUTPATIENT)
Dept: INTERNAL MEDICINE | Facility: CLINIC | Age: 86
End: 2021-05-30

## 2021-05-30 VITALS — BODY MASS INDEX: 26.03 KG/M2 | HEIGHT: 63 IN | WEIGHT: 146.9 LBS

## 2021-05-30 VITALS — WEIGHT: 146.9 LBS | BODY MASS INDEX: 26.03 KG/M2 | HEIGHT: 63 IN

## 2021-05-30 VITALS — HEIGHT: 63 IN | BODY MASS INDEX: 26.22 KG/M2 | WEIGHT: 148 LBS

## 2021-05-30 VITALS — BODY MASS INDEX: 26.68 KG/M2 | WEIGHT: 150.6 LBS | HEIGHT: 63 IN

## 2021-05-30 DIAGNOSIS — K21.9 ESOPHAGEAL REFLUX: ICD-10-CM

## 2021-05-30 NOTE — TELEPHONE ENCOUNTER
Refill Approved    Rx renewed per Medication Renewal Policy. Medication was last renewed on 7/3/18.    Last office visit 4/29/19    Yuri Matta, [facility] Triage/Med Refill 7/16/2019     Requested Prescriptions   Pending Prescriptions Disp Refills     pantoprazole (PROTONIX) 40 MG tablet [Pharmacy Med Name: PANTOPRAZOLE 40MG TABLETS] 90 tablet 0     Sig: TAKE 1 TABLET BY MOUTH DAILY       GI Medications Refill Protocol Passed - 7/15/2019 12:04 PM        Passed - PCP or prescribing provider visit in last 12 or next 3 months.     Last office visit with prescriber/PCP: 4/29/2019 Brando Rodriguez MD OR same dept: 4/29/2019 Brando Rodriguez MD OR same specialty: 4/29/2019 Brando Rodriguez MD  Last physical: 2/28/2017 Last MTM visit: Visit date not found   Next visit within 3 mo: Visit date not found  Next physical within 3 mo: Visit date not found  Prescriber OR PCP: Brando Rodriguez MD  Last diagnosis associated with med order: 1. Esophageal reflux  - pantoprazole (PROTONIX) 40 MG tablet [Pharmacy Med Name: PANTOPRAZOLE 40MG TABLETS]; TAKE 1 TABLET BY MOUTH DAILY  Dispense: 90 tablet; Refill: 0    If protocol passes may refill for 12 months if within 3 months of last provider visit (or a total of 15 months).

## 2021-05-30 NOTE — TELEPHONE ENCOUNTER
I called Radha to set up a CT but the order would  before we were able to schedule so I put in a request to have a new order placed and then we can try again.

## 2021-05-31 VITALS — BODY MASS INDEX: 24.98 KG/M2 | HEIGHT: 63 IN | WEIGHT: 141 LBS

## 2021-05-31 VITALS — BODY MASS INDEX: 27.44 KG/M2 | WEIGHT: 150 LBS

## 2021-05-31 VITALS — BODY MASS INDEX: 25.11 KG/M2 | WEIGHT: 141.7 LBS | HEIGHT: 63 IN

## 2021-05-31 VITALS — BODY MASS INDEX: 25.39 KG/M2 | HEIGHT: 63 IN | WEIGHT: 143.3 LBS

## 2021-05-31 VITALS — HEIGHT: 62 IN | BODY MASS INDEX: 27.79 KG/M2 | WEIGHT: 151 LBS

## 2021-05-31 VITALS — HEIGHT: 64 IN | BODY MASS INDEX: 25.27 KG/M2 | WEIGHT: 148 LBS

## 2021-05-31 VITALS — BODY MASS INDEX: 28.16 KG/M2 | WEIGHT: 153 LBS | HEIGHT: 62 IN

## 2021-05-31 VITALS — BODY MASS INDEX: 25.78 KG/M2 | HEIGHT: 64 IN | WEIGHT: 151 LBS

## 2021-05-31 VITALS — WEIGHT: 145.1 LBS | BODY MASS INDEX: 24.77 KG/M2 | HEIGHT: 64 IN

## 2021-05-31 VITALS — WEIGHT: 148 LBS | BODY MASS INDEX: 27.23 KG/M2 | HEIGHT: 62 IN

## 2021-05-31 VITALS — WEIGHT: 146.5 LBS | HEIGHT: 63 IN | BODY MASS INDEX: 25.96 KG/M2

## 2021-05-31 VITALS — BODY MASS INDEX: 25.24 KG/M2 | WEIGHT: 142.5 LBS

## 2021-05-31 RX ORDER — PANTOPRAZOLE SODIUM 40 MG/1
40 TABLET, DELAYED RELEASE ORAL DAILY
Qty: 90 TABLET | Refills: 0 | Status: SHIPPED | OUTPATIENT
Start: 2021-05-31 | End: 2021-12-23

## 2021-05-31 NOTE — TELEPHONE ENCOUNTER
Spoke with pt and assisted in scheduling her for an INF appt with PCP on Monday.  Pt understanding.

## 2021-05-31 NOTE — PATIENT INSTRUCTIONS - HE
1.  Stop Ferrous Sulfate    2.  Next Prolia due in November    3.  See then with monitoring labs    4.  Flu shot in fall

## 2021-05-31 NOTE — TELEPHONE ENCOUNTER
New Appointment Needed  What is the reason for the visit:    Inpatient/ED Follow Up Appt Request  At what hospital or facility were you seen?: ER in [facility]  What is the reason you were seen?: Fell and hit her head on the left side.  Area .  Patient has printout of ER visit.  CT showed some findings.     What date were you admitted?: Saturday, 08/03/19  What date were you discharged?: 08/03/19  What was the recommended timeframe for your follow up appointment?: Patient did not know  Provider Preference: Any available  How soon do you need to be seen?: Please call patient.  She is not sure how soon she soul be seen. She was told to follow up with her primary provider because of the results of the CT scan.   Waitlist offered?: No  Okay to leave a detailed message:  Yes

## 2021-05-31 NOTE — PROGRESS NOTES
ASSESSMENT:  1. Diuretic-induced hypokalemia  She is on furosemide for congestive heart failure with preserved ejection fraction.  Potassium is used for diuretic induced hypokalemia  - potassium chloride (K-DUR,KLOR-CON) 20 MEQ tablet; Take 1 tablet (20 mEq total) by mouth daily.  Dispense: 90 tablet; Refill: 3    2. Hypothyroidism  She is on levothyroxine 75 mcg daily.  He seems clinically euthyroid    3. Esophageal reflux  Symptoms are under acceptable control with pantoprazole  - pantoprazole (PROTONIX) 40 MG tablet; Take 1 tablet (40 mg total) by mouth daily.  Dispense: 90 tablet; Refill: 0    4.  Closed head trauma  She fell while visiting relatives in [facility] was seen in an ER there.  CT scan revealed no evidence for subarachnoid or subdural hemorrhage.  It incidentally revealed a small old right-sided lacunar infarct.  She fortunately does not have significant postconcussive symptoms    5.  History of iron deficiency anemia:  Ferritin was up to 100 when checked in the spring.  She was advised to stop taking ferrous sulfate, but is not sure if she is still taking it or not.  She was advised to stop if taking.    6.  Osteoporosis:  Next Prolia injection is due about November 1    7.  Congestive heart failure with preserved ejection fraction:  She seems well compensated on her current furosemide dose    8.  Incidental lacunar infarct:  The significance of this was discussed    9.  Health maintenance:  She has completed the shingrix vaccine series    PLAN:  Patient Instructions   1.  Stop Ferrous Sulfate    2.  Next Prolia due in November    3.  See then with monitoring labs    4.  Flu shot in fall      No orders of the defined types were placed in this encounter.    Medications Discontinued During This Encounter   Medication Reason     potassium chloride SA (K-DUR,KLOR-CON) 20 MEQ tablet Reorder     pantoprazole (PROTONIX) 40 MG tablet Reorder       Return in about 3 months (around 11/12/2019) for  "Recheck.      ASSESSED PROBLEMS:  1. Diuretic-induced hypokalemia  potassium chloride (K-DUR,KLOR-CON) 20 MEQ tablet   2. Hypothyroidism     3. Esophageal reflux  pantoprazole (PROTONIX) 40 MG tablet       CHIEF COMPLAINT:  Chief Complaint   Patient presents with     Follow-up     PUMA 19       HISTORY OF PRESENT ILLNESS:  Radha is a 83 y.o. female presenting to the clinic today for inpatient follow up [place] ED follow up. She is accompanied by her daughter.     Emergency Dept: She was bending over to take off her shoes and she fell over, hitting her head. Her granddaughter is a nurse so she worked her up at home and then they took her to the ER. He head is still a littler tender where she hit but she does feel back to normal.     Health Maintenance: Shringrix completed. DXA coming soon    REVIEW OF SYSTEMS:   Denies fluttering or pounding of heart   All other systems are negative.    PFSH:  Due for next Prolia shot on    Visited her granddaughter in [place]     TOBACCO USE:  Social History     Tobacco Use   Smoking Status Former Smoker     Packs/day: 0.50     Years: 20.00     Pack years: 10.00     Types: Cigarettes     Last attempt to quit: 1976     Years since quittin.6   Smokeless Tobacco Never Used   Tobacco Comment    no second hand smoke exposure       VITALS:  Vitals:    19 1149   BP: 116/66   Patient Site: Left Arm   Patient Position: Sitting   Cuff Size: Adult Regular   Pulse: 74   SpO2: 97%   Weight: 157 lb (71.2 kg)   Height: 5' 2\" (1.575 m)     Wt Readings from Last 3 Encounters:   19 157 lb (71.2 kg)   19 163 lb (73.9 kg)   19 156 lb (70.8 kg)     Body mass index is 28.72 kg/m .    PHYSICAL EXAM:  Constitutional:  Reveals an alert, pleasant woman.  Vitals: per nursing notes.  HEENT: Resolving ecchymosis left forehead   Ears: Clean, no hemotympanum.    Eyes:  EOMs full, PERRL.  Oropharynx:  Full range of motion of jaw, no broken teeth, no " malocclusion  Neck:  Supple, no carotid bruits  Back:  No spine or CVA pain.  Thorax:  No bony deformities.  Lungs: Clear to A&P without rales or wheezes.  Respiratory effort normal.  Cardiac:   Regular rate and rhythm, normal S1, S2, no murmurs  Abdomen:  Abdominal right upper quadrant scar. Soft, active bowel sounds without bruits or tenderness.  Extremities:   No peripheral edema.    Skin:  No lesions to suggest malignancy.  Neuro:  Alert and oriented.  No gross focal deficits. Detailed exam not done   Psychiatric:  Memory intact, mood appropriate.        ADDITIONAL HISTORY SUMMARIZED (2): Reviewed patient provided note from [place] ED  DECISION TO OBTAIN EXTRA INFORMATION (1): None.   RADIOLOGY TESTS (1): reviewed Patient provided  Head CT report   LABS (1): Labs reviewed.  MEDICINE TESTS (1): None.  INDEPENDENT REVIEW (2 each): None.     The visit lasted a total of 16 minutes face to face with the patient. Over 50% of the time was spent counseling and educating the patient about [place] ED follow up.    IMarissa, am scribing for and in the presence of, Dr. Rodriguez.    IBrando, personally performed the services described in this documentation, as scribed by Marissa Bustamante in my presence, and it is both accurate and complete.    Dragon dictation was used for this note.  Speech recognition errors are a possibility.    MEDICATIONS:  Current Outpatient Medications   Medication Sig Dispense Refill     acetaminophen (TYLENOL) 325 MG tablet Take 650 mg by mouth every 6 (six) hours as needed for pain.       cholecalciferol, vitamin D3, (VITAMIN D3) 2,000 unit Tab Take 2,000 Units by mouth daily.        citalopram (CELEXA) 20 MG tablet TAKE 1 TABLET(20 MG) BY MOUTH DAILY 90 tablet 3     ferrous sulfate 325 (65 FE) MG tablet TAKE 1 TABLET(325 MG) BY MOUTH DAILY WITH BREAKFAST 90 tablet 3     fluticasone-vilanterol (BREO ELLIPTA) 100-25 mcg/dose DsDv inhaler Inhale 1 puff daily. 1 each 11     furosemide  (LASIX) 40 MG tablet Take 80 mg by mouth every morning.       levothyroxine (SYNTHROID, LEVOTHROID) 75 MCG tablet TAKE 1 TABLET(75 MCG) BY MOUTH DAILY 90 tablet 3     pantoprazole (PROTONIX) 40 MG tablet Take 1 tablet (40 mg total) by mouth daily. 90 tablet 0     potassium chloride (K-DUR,KLOR-CON) 20 MEQ tablet Take 1 tablet (20 mEq total) by mouth daily. 90 tablet 3     rivaroxaban 20 mg Tab Take 1 tablet (20 mg total) by mouth daily with supper. 90 tablet 3     simvastatin (ZOCOR) 20 MG tablet TAKE 1 TABLET(20 MG) BY MOUTH AT BEDTIME 90 tablet 3     sotalol (BETAPACE) 80 MG tablet TAKE 1 TABLET(80 MG) BY MOUTH TWICE DAILY 180 tablet 2     latanoprost (XALATAN) 0.005 % ophthalmic solution INT 1 GTT IN OU QD  12     Current Facility-Administered Medications   Medication Dose Route Frequency Provider Last Rate Last Dose     denosumab 60 mg (PROLIA 60 mg/ml)  60 mg Subcutaneous Q6 Months Jermaine Diez, PharmD   60 mg at 05/01/19 1054       Total data points:4

## 2021-06-01 ENCOUNTER — RECORDS - HEALTHEAST (OUTPATIENT)
Dept: ADMINISTRATIVE | Facility: CLINIC | Age: 86
End: 2021-06-01

## 2021-06-01 VITALS — HEIGHT: 64 IN | BODY MASS INDEX: 26.27 KG/M2 | WEIGHT: 153.9 LBS

## 2021-06-01 VITALS — HEIGHT: 64 IN | BODY MASS INDEX: 26.29 KG/M2 | WEIGHT: 154 LBS

## 2021-06-01 VITALS — BODY MASS INDEX: 25.65 KG/M2 | WEIGHT: 149.44 LBS

## 2021-06-01 VITALS — BODY MASS INDEX: 26.12 KG/M2 | WEIGHT: 153 LBS | HEIGHT: 64 IN

## 2021-06-01 VITALS — BODY MASS INDEX: 26.35 KG/M2 | WEIGHT: 153.5 LBS

## 2021-06-01 VITALS — WEIGHT: 151.31 LBS | BODY MASS INDEX: 25.97 KG/M2

## 2021-06-01 VITALS — BODY MASS INDEX: 26.09 KG/M2 | WEIGHT: 152 LBS

## 2021-06-01 NOTE — TELEPHONE ENCOUNTER
----- Message from Brando Rodriguez MD sent at 9/16/2019  9:12 AM CDT -----  Radha:   Bone density changes show an excellent response to Prolia.  I would advise continuing this with a recheck in 2 years.  (Under recommendations it is supposed to state  continuation of Prolia is advised, not  Attenuation).    Brando Rodriguez MD

## 2021-06-02 VITALS — WEIGHT: 156 LBS | BODY MASS INDEX: 28.71 KG/M2 | HEIGHT: 62 IN

## 2021-06-03 VITALS — HEIGHT: 62 IN | WEIGHT: 163 LBS | BODY MASS INDEX: 30 KG/M2

## 2021-06-03 VITALS — HEIGHT: 62 IN | WEIGHT: 157 LBS | BODY MASS INDEX: 28.89 KG/M2

## 2021-06-03 VITALS
OXYGEN SATURATION: 97 % | DIASTOLIC BLOOD PRESSURE: 70 MMHG | HEIGHT: 62 IN | WEIGHT: 160 LBS | HEART RATE: 76 BPM | SYSTOLIC BLOOD PRESSURE: 110 MMHG | BODY MASS INDEX: 29.44 KG/M2

## 2021-06-03 NOTE — PATIENT INSTRUCTIONS - HE
1.  Schedule screening mammogram    2.  Recheck bone density in two years.      3.  Continue Prolia with next injection in two years    4.  See in six months for Annual wellness check

## 2021-06-03 NOTE — PROGRESS NOTES
The following steps were completed to comply with the REMS program for Prolia:  1. Ordering provider has previously reviewed information in the Medication Guide and Patient Counseling Chart, including the serious risks of Prolia  and the symptoms of each risk and have been advised  to seek prompt medical attention if they have signs or symptoms of any of the serious risks.  2. Provided each patient a copy of the Medication Guide and Patient Brochure.  See MAR for administration details.   Indication: Prolia  (denosumab) is a prescription medicine used to treat osteoporosis in patients who:   Are at high risk for fracture, meaning patients who have had a fracture related to osteoporosis, or who have multiple risk factors for fracture; Cannot use another osteoporosis medicine or other osteoporosis medicines did not work well.   The timeline for early/late injections would be 4 weeks early and any time after the 6 month nadira. If a patient receives their injection late, then the subsequent injection would be 6 months from the date that they actually received the injection    1.  When was the last injection?  05/01/19    2.  Has insurance for this injection been verified?  Yes    3.  Did you experience any new onset achiness or rashes that lasted for over a month with your previous Prolia injection?   No    4.  Do you have a fever over 101?F or a new deep cough that is unusual for you today? No    5.  Have you started any new medications in the last 6 months that you were told could affect your immune system? These may have been prescribed by oncologist, transplant, rheumatology, or dermatology.   No    6.  In the last 6 months have you have gastric bypass or parathyroid surgery?   No    7.  Do you plan dental work requiring drilling into the bone such as implants/extractions or oral surgery in the next 2-3 months?   No

## 2021-06-03 NOTE — PROGRESS NOTES
ASSESSMENT:  1. Visit for screening mammogram  Screening mammogram is advised  - Mammo Screening Bilateral; Future    2. Encounter for therapeutic drug monitoring  She will have a basic metabolic panel done for monitoring of furosemide therapy  - Basic Metabolic Panel    3.  Chronic congestive heart failure with preserved ejection fraction  Radha appears well compensated    4.  Essential hypertension:  Good control on current regimen    5.  Paroxysmal atrial fibrillation:  She remains on sotalol.  She has not had symptomatic palpitations.  She is on Xarelto as an anticoagulant.    6.  Osteoporosis on Prolia:  Her recent DEXA scan showed a dramatic improvement in bone density.  She will receive Prolia again today and every 6 months.  Recheck of bone density in 2 years is advised.    PLAN:  Patient Instructions   1.  Schedule screening mammogram    2.  Recheck bone density in two years.      3.  Continue Prolia with next injection in 6 months.    4.  See in six months for Annual wellness check     5.  Check basic metabolic panel today    Orders Placed This Encounter   Procedures     Mammo Screening Bilateral     Standing Status:   Future     Standing Expiration Date:   2/5/2021     Order Specific Question:   Patient's previous breast density:     Answer:   Scattered fibroglandular density [2]     Order Specific Question:   Can the procedure be changed per Radiologist protocol?     Answer:   Yes     Basic Metabolic Panel     Medications Discontinued During This Encounter   Medication Reason     ferrous sulfate 325 (65 FE) MG tablet Therapy completed     Administrations This Visit     denosumab 60 mg (PROLIA 60 mg/ml)     Admin Date  11/05/2019 Action  Given Dose  60 mg Route  Subcutaneous Administered By  Alisson Ricketts CMA              Return in about 6 months (around 5/5/2020) for Annual physical.      ASSESSED PROBLEMS:  1. Visit for screening mammogram  Mammo Screening Bilateral   2. Encounter for therapeutic drug  "monitoring  Basic Metabolic Panel       CHIEF COMPLAINT:  Chief Complaint   Patient presents with     Follow-up     3 months     Injections     prolia       HISTORY OF PRESENT ILLNESS:  Radha is a 84 y.o. female presenting to the clinic today to follow up on her osteoporosis and CHF.     CHF: She gets more short of breath with exertion than she did when she was younger, but she is not necessarily concerned about this. She notices shortness of breath going up stairs. She denies extremity swelling.     Osteoporosis: She had a DXA two months ago that showed a dramatic improvement in bone density since starting Prolia. She got a Prolia shot today.     Health Maintenance: Her last mammogram was 2018.     REVIEW OF SYSTEMS:   She feels stiff all the time, which she attributes to arthritis. She does not think she is taking an iron supplement anymore. Her mood has been good. She has not had any residual head symptoms since falling and hitting her head eariler this year. She has not noticed any fluttering or pounding of her heart. All other systems are negative.    PFSH:  She continues to exercise regularly. She is staying in [place] for the winter.     TOBACCO USE:  Social History     Tobacco Use   Smoking Status Former Smoker     Packs/day: 0.50     Years: 20.00     Pack years: 10.00     Types: Cigarettes     Last attempt to quit: 1976     Years since quittin.8   Smokeless Tobacco Never Used   Tobacco Comment    no second hand smoke exposure       VITALS:  Vitals:    19 0952   BP: 110/70   Patient Site: Left Arm   Patient Position: Sitting   Cuff Size: Adult Large   Pulse: 76   SpO2: 97%   Weight: 160 lb (72.6 kg)   Height: 5' 2\" (1.575 m)     Wt Readings from Last 3 Encounters:   19 160 lb (72.6 kg)   19 157 lb (71.2 kg)   19 163 lb (73.9 kg)     Body mass index is 29.26 kg/m .    PHYSICAL EXAM:  Constitutional:   Reveals an alert, talkative woman. Affect appropriate. Does not " appear acutely ill.  Vitals: per nursing notes.  HEENT: Atraumatic.   Neck:  No adenopathy, thyromegaly, or bruits.   Back:  No spine or CVA pain.  Thorax:  No bony deformities.  Lungs: Clear to A&P   Cardiac:   Regular rate and rhythm, normal S1, S2, no murmur or gallop.  Abdomen:  Soft, right upper quadrant scar, lower abdominal midline scar. No bruits, mass, or tenderness.   Extremities:   No peripheral edema. Joint changes consistent with osteoarthritis.    Skin:  No jaundice, peripheral cyanosis or lesions to suggest malignancy.  Neuro: No gross focal deficits. Detailed exam not done   Psychiatric:  Memory intact, mood appropriate.      ADDITIONAL HISTORY SUMMARIZED (2): None.  DECISION TO OBTAIN EXTRA INFORMATION (1): None.   RADIOLOGY TESTS (1): Reviewed 9/13/2019 DXA showing significant improvement in bone density. Mammogram ordered.   LABS (1): Labs from 4/29/2019 reviewed. Labs ordered.    MEDICINE TESTS (1): None.  INDEPENDENT REVIEW (2 each): None.     The visit lasted a total of 11 minutes face to face with the patient. Over 50% of the time was spent counseling and educating the patient about her osteoporosis and medications.    I, Rene Patel, am scribing for and in the presence of, Dr. Rodriguez.    IBrando, personally performed the services described in this documentation, as scribed by Rene Patel in my presence, and it is both accurate and complete.    Dragon dictation was used for this note.  Speech recognition errors are a possibility.    MEDICATIONS:  Current Outpatient Medications   Medication Sig Dispense Refill     acetaminophen (TYLENOL) 325 MG tablet Take 650 mg by mouth every 6 (six) hours as needed for pain.       cholecalciferol, vitamin D3, (VITAMIN D3) 2,000 unit Tab Take 2,000 Units by mouth daily.        citalopram (CELEXA) 20 MG tablet TAKE 1 TABLET(20 MG) BY MOUTH DAILY 90 tablet 3     furosemide (LASIX) 40 MG tablet Take 80 mg by mouth every morning.       latanoprost  (XALATAN) 0.005 % ophthalmic solution INT 1 GTT IN OU QD  12     levothyroxine (SYNTHROID, LEVOTHROID) 75 MCG tablet TAKE 1 TABLET(75 MCG) BY MOUTH DAILY 90 tablet 3     pantoprazole (PROTONIX) 40 MG tablet Take 1 tablet (40 mg total) by mouth daily. 90 tablet 0     potassium chloride (K-DUR,KLOR-CON) 20 MEQ tablet Take 1 tablet (20 mEq total) by mouth daily. 90 tablet 3     rivaroxaban 20 mg Tab Take 1 tablet (20 mg total) by mouth daily with supper. 90 tablet 3     simvastatin (ZOCOR) 20 MG tablet TAKE 1 TABLET(20 MG) BY MOUTH AT BEDTIME 90 tablet 3     sotalol (BETAPACE) 80 MG tablet Take 1 tablet (80 mg total) by mouth 2 (two) times a day. 180 tablet 3     fluticasone furoate-vilanterol (BREO ELLIPTA) 100-25 mcg/dose DsDv inhaler Inhale 1 Dose daily. 1 each 11     No current facility-administered medications for this visit.        Total data points: 2

## 2021-06-04 VITALS
HEART RATE: 74 BPM | OXYGEN SATURATION: 96 % | HEIGHT: 64 IN | DIASTOLIC BLOOD PRESSURE: 64 MMHG | SYSTOLIC BLOOD PRESSURE: 108 MMHG | BODY MASS INDEX: 26.12 KG/M2 | WEIGHT: 153 LBS

## 2021-06-04 VITALS
DIASTOLIC BLOOD PRESSURE: 69 MMHG | BODY MASS INDEX: 29.52 KG/M2 | SYSTOLIC BLOOD PRESSURE: 134 MMHG | HEART RATE: 77 BPM | OXYGEN SATURATION: 93 % | HEIGHT: 62 IN | WEIGHT: 160.4 LBS | TEMPERATURE: 97.6 F

## 2021-06-04 VITALS
DIASTOLIC BLOOD PRESSURE: 70 MMHG | HEART RATE: 74 BPM | RESPIRATION RATE: 16 BRPM | WEIGHT: 155 LBS | BODY MASS INDEX: 26.46 KG/M2 | SYSTOLIC BLOOD PRESSURE: 90 MMHG | HEIGHT: 64 IN

## 2021-06-04 NOTE — PATIENT INSTRUCTIONS - HE
You can continue benadryl or zyrtec (less sedating, fewer side effects) as needed for itching. Use triamcinolone (rx cream) as needed for up to two weeks.

## 2021-06-04 NOTE — PROGRESS NOTES
ASSESSMENT:  1.  Chest pain:  Radha was hospitalized from 12 28-12 29 after developing substernal chest [facility].  Cardiac enzymes returned negative.  She felt symptoms could have been related to reflux.  She has had previous stress test in 2016 and 2018 with no evidence for reversible ischemia.  I feel symptoms are most likely GI in etiology.  She has already on praise all.  She was advised to try a liquid antacid if similar symptoms her in the future.    2.  Congestive heart failure with preserved ejection fraction:  No current issues with peripheral edema.  Exercise tolerance has remained stable    3.  Esophageal reflux:  See above    4.  Essential hypertension:  Good control on current regimen    5.  Paroxysmal atrial fibrillation:  On sotalol with Xarelto as an anticoagulant.  No recent symptomatic palpitations    PLAN:  1.  Avoid use of aspirin and ibuprofen in the future.  Tylenol is acceptable    2.  Continue pantoprazole.  Add a liquid antacid if symptoms recur    3.  She does have a follow-up appointment with Dr. Paulson in the future.  She will discuss whether further cardiac assessment is appropriate.    4.  See in April or May for annual wellness check.    5. Post Discharge Medication Reconciliation Status: discharge medications reconciled, continue medications without change    There are no discontinued medications.    Return in about 3 months (around 3/31/2020) for Annual physical.    ASSESSED PROBLEMS:  1. Chest pain, unspecified type     2. Chronic heart failure with preserved ejection fraction (H)     3. Essential hypertension with goal blood pressure less than 140/90     4. Gastroesophageal reflux disease without esophagitis         CHIEF COMPLAINT:  Chief Complaint   Patient presents with     Follow-up     PUMA 12/29/19, denied chest pain and SOB       HISTORY OF PRESENT ILLNESS:  Radha is a 84 y.o. female in for follow-up after recent hospitalization.  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  She was evaluated in the ER.  Serial cardiac enzymes were negative.  She does have a history of esophagitis treated with pantoprazole.  She acknowledges she had been using ibuprofen prior to onset of symptoms.  Since leaving the hospital she has felt well.  she has a history of congestive heart failure with preserved ejection fraction.  Her exercise tolerance has been stable.  There is been no orthopnea or PND.  Echocardiogram revealed an ejection fraction of 64% with moderate mitral insufficiency and mild aortic stenosis.  No focal wall motion abnormalities were noted.  Her last stress test in  2018 showed no reversible ischemia    She is on Betapace due to a history of paroxysmal atrial fibrillation.  She has not had any symptomatic palpitations and no atrial fib was noted during her hospitalization.  She is on Xarelto as an anticoagulant.    Blood pressure has been under good control.    REVIEW OF SYSTEMS:    Comprehensive review of systems is otherwise negative.    PFSH:  .  Lives independently.    TOBACCO USE:  Social History     Tobacco Use   Smoking Status Former Smoker     Packs/day: 0.50     Years: 20.00     Pack years: 10.00     Types: Cigarettes     Last attempt to quit: 1976     Years since quittin.0   Smokeless Tobacco Never Used   Tobacco Comment    no second hand smoke exposure       VITALS:  Vitals:    19 1251   BP: 108/64   Patient Site: Left Arm   Patient Position: Sitting   Cuff Size: Adult Regular   Pulse: 74   SpO2: 96%   Weight: 153 lb (69.4 kg)   Height: 5' 4\" (1.626 m)     Wt Readings from Last 3 Encounters:   19 153 lb (69.4 kg)   19 152 lb 6.4 oz (69.1 kg)   19 160 lb 6.4 oz (72.8 kg)       PHYSICAL EXAM:  Constitutional:   Reveals an alert pleasant woman who does not appear acutely ill.  Vitals: per nursing notes.  HEENT: Atraumatic  Eyes:  EOMs full, PERRL.  Neck:  Supple, no " carotid bruits or adenopathy.  Back:  No spine or CVA pain.  Thorax:  No bony deformities.  Lungs: Clear to A&P without rales or wheezes.  Respiratory effort normal.  Cardiac:   Regular rate and rhythm, normal S1, S2, 2/6 systolic murmur at left sternal border to ape  Abdomen:  Soft, active bowel sounds without bruits, mass, or tenderness.  Right upper quadrant scar  Extremities:   No peripheral edema, pulses in the feet intact.    Skin:  No jaundice, peripheral cyanosis or lesions to suggest malignancy.  Neuro:  Alert and oriented..  No gross focal deficits.  Psychiatric:  Memory intact, mood appropriate.    DATA REVIEWED:  Additional History from Old Records Summarized (2): None.  Decision to Obtain Records (1): None.   Radiology Tests Summarized or Ordered (1): None.  Labs Reviewed or Ordered (1): None.  Medicine Test Summarized or Ordered (1): None.   Independent Review of EKG or X-RAY(2 each): None.    The visit lasted a total of 25 minutes face to face with the patient. Over 50% of the time was spent counseling and educating the patient about evaluation and management of chest pain.    Dragon dictation was used for this note. Speech recognition errors are a possibility.     MEDICATIONS:  Current Outpatient Medications   Medication Sig Dispense Refill     acetaminophen (TYLENOL) 325 MG tablet Take 650 mg by mouth every 6 (six) hours as needed for pain.       cholecalciferol, vitamin D3, (VITAMIN D3) 2,000 unit Tab Take 2,000 Units by mouth daily.        citalopram (CELEXA) 20 MG tablet TAKE 1 TABLET(20 MG) BY MOUTH DAILY 90 tablet 3     fluticasone furoate-vilanterol (BREO ELLIPTA) 100-25 mcg/dose DsDv inhaler Inhale 1 Dose daily.       furosemide (LASIX) 40 MG tablet Take 80 mg by mouth every morning.       latanoprost (XALATAN) 0.005 % ophthalmic solution Administer 1 drop to both eyes at bedtime.   12     levothyroxine (SYNTHROID, LEVOTHROID) 75 MCG tablet TAKE 1 TABLET(75 MCG) BY MOUTH DAILY 90 tablet 3      pantoprazole (PROTONIX) 40 MG tablet Take 1 tablet (40 mg total) by mouth daily. 90 tablet 0     potassium chloride (K-DUR,KLOR-CON) 20 MEQ tablet Take 1 tablet (20 mEq total) by mouth daily. 90 tablet 3     rivaroxaban 20 mg Tab Take 1 tablet (20 mg total) by mouth daily with supper. 90 tablet 3     simvastatin (ZOCOR) 20 MG tablet TAKE 1 TABLET(20 MG) BY MOUTH AT BEDTIME 90 tablet 3     sotalol (BETAPACE) 80 MG tablet Take 1 tablet (80 mg total) by mouth 2 (two) times a day. 180 tablet 3     triamcinolone (KENALOG) 0.1 % cream Apply 1 application topically 2 (two) times a day as needed (Rash).       No current facility-administered medications for this visit.

## 2021-06-04 NOTE — TELEPHONE ENCOUNTER
"Pt reports new itching -> \"around neck and inner aspect of upper arms, and chest.\"  Started 18 hours ago.  Skin is \"red and itchy.\"  No anaphylactic symptoms whatsoever.    Pt cannot recall any new soaps, foods or other possible causes.  Pt has cleansed skin with Ivory soap.  Also applied hydrocortisone cream, however cream is , not effective.    Plan of action as follows:  - re-launder clothing and pajamas, double rinse  - boost overall hydration  - choose hydrating solids as well (applesauce, soups, yogurt)  - try a non-drowsy antihistamine during daytime (generic Zyrtec or Claritin)  - may also add Benadryl at bedtime  - purchase a fresh tube of hydrocortisone cream  - keep topical cream in refrigerator for best relief of itching after applying  - seek clinical eval if no improvement after 48 hours, or any spreading of sxs    Pt verbalizes understanding.  Agrees to plan.    Adriana Moreau RN  [facility] Triage     Reason for Disposition    Localized hives    Protocols used: HIVES-A-OH      "

## 2021-06-04 NOTE — PROGRESS NOTES
TCM DISCHARGE FOLLOW UP CALL    Discharge Date:  12/29/2019  Reason for hospital stay (discharge diagnosis)::  Chest pain  Are you feeling better, the same or worse since your discharge?:  Patient is feeling better (Denies CP, pressure, SOB, palpitations, diaphoresis. States she is having some abd crampping. Had a normal BM today.)  Do you feel like you have a plan in the event of a health emergency?: Yes (Children)    As part of your discharge plan, were  home care services ordered for you?: No    Did you receive any new medications, or was there a change to your medications?: No    Do you have any follow up visits scheduled with your PCP or Specialist?:  Yes, with PCP  (RN) Is PCP appt scheduled soon enough (within 14 days of discharge date)?: Yes (12/31)

## 2021-06-04 NOTE — PATIENT INSTRUCTIONS - HE
1.  Avoid use of Ibuprofen or Aleve in the future.    2.  Tylenol is OK for pain    3.  Try a liquid antacid if discomfort recurs    4.  See in April or May for annual wellness check.

## 2021-06-04 NOTE — PROGRESS NOTES
ASSESSMENT & PLAN:  1. Rash  Unclear trigger at this time.  Recommend triamcinolone cream twice daily for up to 2 weeks.  She can also continue with antihistamine as needed would recommend Zyrtec over Benadryl due to fewer side effects and this was explained.  That me know if worsening, if not improving, if new symptoms.  - triamcinolone (KENALOG) 0.1 % cream; Apply a thin amount to affected skin twice daily for up to two weeks  Dispense: 80 g; Refill: 0      Patient Instructions   You can continue benadryl or zyrtec (less sedating, fewer side effects) as needed for itching. Use triamcinolone (rx cream) as needed for up to two weeks.       No orders of the defined types were placed in this encounter.    There are no discontinued medications.    Return in about 6 months (around 6/17/2020) for Annual physical/wellness with PCP.    CHIEF COMPLAINT:  Chief Complaint   Patient presents with     Rash     inside of both arms and around neck, tried benadryl cleared up a little       HISTORY OF PRESENT ILLNESS:  Radha is a 84 y.o. female presenting to the clinic today for rash.  She called in clinic on 12/9 for this rash and tried home measures, was feeling better, but then the rash returned again yesterday.  It has been around her neck on her collar line as well as volar aspects of both forearms.  Itchy and red.  Topical Benadryl and oral Benadryl have helped, but it keeps coming back.  She cannot think of any new products including no new lotions, soaps, detergents, foods, medications, and no recent travel.  She is wearing necklaces and wrist jewelry, but states these are the same pieces she always wears and has never reacted.  No nausea or vomiting, no lip, tongue, throat swelling, no difficulty breathing or wheezing.    REVIEW OF SYSTEMS:   All other systems are negative.    PFSH:  Patient Active Problem List   Diagnosis     Nephrolithiasis     Migraine Headache     Diverticulosis     Irritable Bowel Syndrome      "Osteoarthritis Of The Knee     Palpitations     Colloid Nontoxic Multinodular Goiter     Adjustment Disorder     Female Stress Incontinence     Pure hypercholesterolemia     Hypothyroidism     Venous Insufficiency     Osteoarthritis     Edema     Lower Back Pain     Vitamin D deficiency     Lymphocytic colitis     Paroxysmal atrial fibrillation (H)     Essential hypertension with goal blood pressure less than 140/90     Mitral valve stenosis, mild     Primary osteoarthritis of one knee, right     Heart failure with preserved ejection fraction (H)     Dyspnea on exertion     Lung nodule     Age-related osteoporosis without current pathological fracture        TOBACCO USE:  Social History     Tobacco Use   Smoking Status Former Smoker     Packs/day: 0.50     Years: 20.00     Pack years: 10.00     Types: Cigarettes     Last attempt to quit: 1976     Years since quittin.9   Smokeless Tobacco Never Used   Tobacco Comment    no second hand smoke exposure       VITALS:  Vitals:    19 1214   BP: 134/69   Patient Site: Left Arm   Patient Position: Sitting   Cuff Size: Adult Regular   Pulse: 77   Temp: 97.6  F (36.4  C)   TempSrc: Oral   SpO2: 93%   Weight: 160 lb 6.4 oz (72.8 kg)   Height: 5' 2\" (1.575 m)     Wt Readings from Last 3 Encounters:   19 160 lb 6.4 oz (72.8 kg)   19 160 lb (72.6 kg)   19 157 lb (71.2 kg)     Body mass index is 29.34 kg/m .    PHYSICAL EXAM:  GENERAL: Pleasant, well-appearing patient in no acute distress.   HEENT: Pupils equal round reactive to light. Sclerae and conjunctivae clear. Oropharynx is clear with moist mucous membranes.   NECK: Supple without lymphadenopathy  CARDIOVASCULAR: Heart regular rate and rhythm without murmur normal S1-S2   LUNGS: Clear to auscultation bilaterally, good air movement throughout   EXTREMITIES Warm and well-perfused without edema.  NEURO: Alert and oriented. Grossly nonfocal.   PSYCHIATRIC: Presents on time and well groomed. " Normal speech and thought content. Full affect. No abnormal movements or behaviors noted.  SKIN: Faint erythema around her lower neck in a distribution around the anterior collar line.  Similar mild erythema on the left volar aspect of the forearm, right appears clear today.  No papules, no vesicles, no open skin lesions.  No increased warmth to palpation.  Nontender to palpation.  No swelling.      MEDICATIONS:  Current Outpatient Medications   Medication Sig Dispense Refill     acetaminophen (TYLENOL) 325 MG tablet Take 650 mg by mouth every 6 (six) hours as needed for pain.       cholecalciferol, vitamin D3, (VITAMIN D3) 2,000 unit Tab Take 2,000 Units by mouth daily.        citalopram (CELEXA) 20 MG tablet TAKE 1 TABLET(20 MG) BY MOUTH DAILY 90 tablet 3     furosemide (LASIX) 40 MG tablet Take 80 mg by mouth every morning.       latanoprost (XALATAN) 0.005 % ophthalmic solution INT 1 GTT IN OU QD  12     levothyroxine (SYNTHROID, LEVOTHROID) 75 MCG tablet TAKE 1 TABLET(75 MCG) BY MOUTH DAILY 90 tablet 3     pantoprazole (PROTONIX) 40 MG tablet Take 1 tablet (40 mg total) by mouth daily. 90 tablet 0     potassium chloride (K-DUR,KLOR-CON) 20 MEQ tablet Take 1 tablet (20 mEq total) by mouth daily. 90 tablet 3     rivaroxaban 20 mg Tab Take 1 tablet (20 mg total) by mouth daily with supper. 90 tablet 3     simvastatin (ZOCOR) 20 MG tablet TAKE 1 TABLET(20 MG) BY MOUTH AT BEDTIME 90 tablet 3     sotalol (BETAPACE) 80 MG tablet Take 1 tablet (80 mg total) by mouth 2 (two) times a day. 180 tablet 3     fluticasone furoate-vilanterol (BREO ELLIPTA) 100-25 mcg/dose DsDv inhaler Inhale 1 Dose daily. 1 each 11     triamcinolone (KENALOG) 0.1 % cream Apply a thin amount to affected skin twice daily for up to two weeks 80 g 0     No current facility-administered medications for this visit.

## 2021-06-04 NOTE — TELEPHONE ENCOUNTER
RN triage call from patient  She states that 2 weeks ago she had developed redness on Both inner forearms and her neck (front and sides)  She did do home cares of using Benadryl tablets and lotion. She states it eventually cleared up  This morning she woke up and this redness and itchiness was back in the same places.  No bumps or spots but the redness is very itchy. She has used a Benadryl tablet and states the redness has faded a bit today.  No fever  No new foods nothing new in her home products. No trouble breathing, no facial involvement, inside throat feels fine.  Patient would like to be seen today. Did discuss options that were available to patient as no appointments near her home today.  She declined an urgent care or WIC.  Reviewed signs and symptoms of when to call back and patient was warm transferred to scheduling and appointment made 12/17  Patient stated understanding.  Winifred Pal RN  Care [facility] Triage Nurse  10:10 AM  12/16/2019      Reason for Disposition    Patient wants to be seen    Protocols used: RASH OR REDNESS - [id number]

## 2021-06-05 VITALS
OXYGEN SATURATION: 94 % | WEIGHT: 161 LBS | HEIGHT: 64 IN | SYSTOLIC BLOOD PRESSURE: 132 MMHG | DIASTOLIC BLOOD PRESSURE: 78 MMHG | RESPIRATION RATE: 16 BRPM | BODY MASS INDEX: 27.49 KG/M2 | HEART RATE: 75 BPM

## 2021-06-06 NOTE — TELEPHONE ENCOUNTER
Requested Prescriptions     Pending Prescriptions Disp Refills     pantoprazole (PROTONIX) 40 MG tablet [Pharmacy Med Name: PANTOPRAZOLE 40MG TABLETS] 90 tablet 0     Sig: TAKE 1 TABLET(40 MG) BY MOUTH DAILY     Last Appt: 1/3/20  Next Appt:  5/15/20  90 day supply pended

## 2021-06-08 NOTE — TELEPHONE ENCOUNTER
Refill Approved    Rx renewed per Medication Renewal Policy. Medication was last renewed on 2/25/19.    Maddie Medrano, Care Connection Triage/Med Refill 5/28/2020     Requested Prescriptions   Pending Prescriptions Disp Refills     citalopram (CELEXA) 20 MG tablet [Pharmacy Med Name: CITALOPRAM 20MG TABLETS] 90 tablet 3     Sig: TAKE 1 TABLET(20 MG) BY MOUTH DAILY       SSRI Refill Protocol  Passed - 5/26/2020  9:07 AM        Passed - PCP or prescribing provider visit in last year     Last office visit with prescriber/PCP: 12/31/2019 Brando Rodriguez MD OR same dept: 12/31/2019 Brando Rodriguez MD OR same specialty: 12/31/2019 Brando Rodriguez MD  Last physical: 2/28/2017 Last MTM visit: Visit date not found   Next visit within 3 mo: Visit date not found  Next physical within 3 mo: Visit date not found  Prescriber OR PCP: Brando Rodriguez MD  Last diagnosis associated with med order: 1. Reactive depression  - citalopram (CELEXA) 20 MG tablet [Pharmacy Med Name: CITALOPRAM 20MG TABLETS]; TAKE 1 TABLET(20 MG) BY MOUTH DAILY  Dispense: 90 tablet; Refill: 3    If protocol passes may refill for 12 months if within 3 months of last provider visit (or a total of 15 months).

## 2021-06-09 NOTE — PATIENT INSTRUCTIONS - HE
1.  Schedule PCR test for Covid-19.    2.  Symptomatic treatment for febrile illness including Tylenol and fluids.    3.  Self quarantine is advised until COVID results are back.    4.  See for annual wellness visit in the fall.  Thyroid function needs to be rechecked then.

## 2021-06-09 NOTE — ANESTHESIA PROCEDURE NOTES
Spinal Block    Patient location during procedure: OR  Start time: 3/14/2017 9:41 AM  End time: 3/14/2017 9:46 AM  Reason for block: primary anesthetic    Staffing:  Performing  Anesthesiologist: ANTONIA MILLER    Preanesthetic Checklist  Completed: patient identified, risks, benefits, and alternatives discussed, timeout performed, consent obtained, airway assessed, oxygen available, suction available, emergency drugs available and hand hygiene performed  Spinal Block  Patient position: sitting  Prep: ChloraPrep and site prepped and draped  Patient monitoring: heart rate, cardiac monitor, continuous pulse ox and blood pressure  Approach: midline  Location: L3-4  Injection technique: single-shot  Needle type: pencil-tip   Needle gauge: 24 G

## 2021-06-09 NOTE — TELEPHONE ENCOUNTER
Covid test was requested 7/14.  Has this been done yet?   I would advise supportive measures in addition to Covid testing.

## 2021-06-09 NOTE — PROGRESS NOTES
[facility]    Facility:   [facility] [[id number]]   Code Status: FULL CODE     Radha is a 81 y.o. female who Was experiencing pain in the right knee Secondary to osteoarthritis. On 14 March she underwent right total knee arthroplasty. There were no perioperative complications. She's been transferred to the TCU for rehabilitation she is being seen today for review of multiple medical problems    1. Primary osteoarthritis of right knee   9 days status post right TKA.  Continues to complain of significant pain.  Participating with physical therapy.  Night nurse noted spontaneous ambulation in the hallway without exhibiting pain behaviors.  It was the nurses impression that when she was not being watched she seemed to have less pain behaviors.  Recent CRP high at 8.9.  White count normal with normal differential.  Afebrile.  Wound is clean well apposed.  No drainage, no erythema, when engaged in conversation she does not complain of pain to palpation.  Moderate swelling.  No effusion.  No erythema.  Assessment-progressive improvement after TKA.  Significant continuing complaints of pain.  Extended discussion regarding the use of opioids and tramadol and the need to get adequate pain relief but at the same time slowly and progressively diminish her reliance on prescription analgesics.  Patient expresses understanding.     2. Paroxysmal atrial fibrillation   rate controlled rhythm regular.  No signs of heart failure.     3. Gastroesophageal reflux disease without esophagitis   denies GI distress.  Continues pantoprazole 40 mg.  Appetite is been good.  She had an initial drop of 10 pounds in weight since arrival in the TCU.  In the last few days she is regained 3 pounds.     4. Mood disorder   prior history.  Started on citalopram 20 mg by her family physician prior to her surgery.  No direct outward suggestion of significant depression although she does have an unusual amount of  pain complaints.     5. Anemia, blood loss   preop hemoglobin = 13.  Postop = 10.3-10.5-10.9 on 20 March.  Continues on iron supplement.     6. Pain management   oxycodone use is dropped from 10 mg 6 times daily down to 4 times daily.  Tramadol use as increased from 2 times daily to 4 times daily.  Stable use of a occasional APAP.  Extended discussion regarding the importance of adequate pain relief without expectation of complete pain relief and intent of gradual progressive reduction over the next 2 weeks.  Will provide 30 oxycodone 5 mg and 20 tramadol 50 mg at the time of discharge with instructions to follow-up with her family physician if she needs additional analgesics.       Discharge plan:  Medical equipment-walker  Data face-to-face- 23 March 2017  Recommended services- physical therapy.  Occupational therapy.  Home health aide.  Reason for services- physical therapy and occupational therapy for optimization of function and rehabilitation.  Patient is still debilitated, hesitant, and in need of walker assist.  Home health aide for safety with bathing  Because of continued balance impairment and debility it is recommended that she be considered homebound until further rehabilitation occurs.  Absences from home would require considerable and taxing effort at this stage    Patient advised to follow-up with her family physician regarding pain medication needs and follow-up anemia and recommendations for how long to remain on her oral iron        Lab Results   Component Value Date    HGB 10.9 (L) 03/20/2017    HGB 10.5 (L) 03/16/2017    HGB 10.3 (L) 03/15/2017    HGB 13.1 02/28/2017    HGB 13.2 02/21/2017    HGB 12.7 08/08/2016     Results for orders placed or performed during the hospital encounter of 03/14/17   Basic Metabolic Panel   Result Value Ref Range    Sodium 138 136 - 145 mmol/L    Potassium 4.2 3.5 - 5.0 mmol/L    Chloride 107 98 - 107 mmol/L    CO2 24 22 - 31 mmol/L    Anion Gap, Calculation 7 5 -  18 mmol/L    Glucose 145 (H) 70 - 125 mg/dL    Calcium 8.3 (L) 8.5 - 10.5 mg/dL    BUN 17 8 - 28 mg/dL    Creatinine 0.68 0.60 - 1.10 mg/dL    GFR MDRD Af Amer >60 >60 mL/min/1.73m2    GFR MDRD Non Af Amer >60 >60 mL/min/1.73m2               Electronically signed by: Job Banuelos MD

## 2021-06-09 NOTE — ANESTHESIA PREPROCEDURE EVALUATION
Anesthesia Evaluation      Patient summary reviewed   History of anesthetic complications     Airway   Mallampati: II  Neck ROM: full   Pulmonary - normal exam   (+) recent URI, sleep apnea on CPAP, moderate,   (-) not a smoker    ROS comment: Sinus congestion, runny nose                         Cardiovascular   Exercise tolerance: > or = 4 METS  (+) hypertension, valvular problems/murmurs MR and MS, dysrhythmias, CHF, , hypercholesterolemia,     ECG reviewed  Rhythm: regular  Rate: normal,    murmur Location:upper left sternal border   ROS comment: Venous insufficiency     Neuro/Psych    (+) neuromuscular disease,      Comments: Migraine; Adjustment disorder NOS    Endo/Other    (+) hypothyroidism, arthritis,      Comments: Vitamin D Deficiency    GI/Hepatic/Renal    (+)   chronic renal disease,     Comments: IBS, lymphocytic colitis          Dental - normal exam                        Anesthesia Plan  Planned anesthetic: spinal and peripheral nerve block  Tibial and saphenous nerve blocks for POP per surgeon request.  Decadron, Zofran.  Ketamine (0.5 mg/kg).  ASA 4     Anesthetic plan and risks discussed with: patient and child/children  Anesthesia plan special considerations: antiemetics,   Post-op plan: routine recovery

## 2021-06-09 NOTE — ANESTHESIA CARE TRANSFER NOTE
Last vitals:   Vitals:    03/14/17 0920   BP: 135/73   Pulse: 71   Resp: 24   Temp:    SpO2: 98%     Patient's level of consciousness is drowsy  Spontaneous respirations: yes  Maintains airway independently: yes  Dentition unchanged: yes  Oropharynx: oropharynx clear of all foreign objects    QCDR Measures:  ASA# 20 - Surgical Safety Checklist: ASA20A - Safety Checks Done  PQRS# 430 - Adult PONV Prevention: 4558F-8P - Pt did NOT receive => 2 anti-emetic agents  ASA# 8 - Peds PONV Prevention: NA - Not pediatric patient, not GA or 2 or more risk factors NOT present  PQRS# 424 - Gisele-op Temp Management: 4559F - At least one body temp DOCUMENTED => 35.5C or 95.9F within required timeframe  PQRS# 426 - PACU Transfer Protocol: - Transfer of care checklist used  ASA# 14 - Acute Post-op Pain: ASA14B - Patient did NOT experience pain >= 7 out of 10    I completed my SBAR handoff to the receiving nurse per policy and procedure.

## 2021-06-09 NOTE — ANESTHESIA POSTPROCEDURE EVALUATION
Patient: Radha Arnold  RIGHT TOTAL KNEE ARTHROPLASTY  Anesthesia type: spinal    Patient location: PACU  Last vitals:   Vitals:    03/14/17 1300   BP: 142/77   Pulse: 71   Resp: 16   Temp: 37.1  C (98.7  F)   SpO2: 93%     Post vital signs: stable  Level of consciousness: awake, alert and oriented  Post-anesthesia pain: pain controlled  Post-anesthesia nausea and vomiting: no  Pulmonary: unassisted, return to baseline  Cardiovascular: stable and blood pressure at baseline  Hydration: adequate  Anesthetic events: no    QCDR Measures:  ASA# 11 - Gisele-op Cardiac Arrest: ASA11B - Patient did NOT experience unanticipated cardiac arrest  ASA# 12 - Gisele-op Mortality Rate: ASA12B - Patient did NOT die  ASA# 13 - PACU Re-Intubation Rate: NA - No ETT / LMA used for case  ASA# 10 - Composite Anes Safety: ASA10A - No serious adverse event  ASA# 38 - New Corneal Injury: ASA38A - No new exposure keratitis or corneal abrasion in PACU    Additional Notes:

## 2021-06-09 NOTE — TELEPHONE ENCOUNTER
Patient. is calling.    Yesterday she started to fun a fever of 100-101.  Complaining of body aches and increase fatigue.  Mild HA  No URI symptoms.   She stated that she was out on the lawn playing a game on Sunday with friends.  They were social distancing. She stated that she would like testing for COVID-19 if possible. She does not believe that this is COVID, but is aware that she should probably be tested.   I advised her to schedule a telephone visit today with her PCP.   Call back with any new or worsening signs, symptoms, concerns, or questions. She verbalized understanding.  Appt for a telephone visit was made for today at 2:10pm.    Marilee Freeman RN   [facility] Nurse Advisor    COVID 19 Nurse Triage Plan/Patient Instructions    Please be aware that novel coronavirus (COVID-19) may be circulating in the community. If you develop symptoms such as fever, cough, or SOB or if you have concerns about the presence of another infection including coronavirus (COVID-19), please contact your health care provider or visit [email or web address].     Disposition/Instructions    Virtual Visit with provider recommended. Reference Visit Selection Guide.    Thank you for taking steps to prevent the spread of this virus.  o Limit your contact with others.  o Wear a simple mask to cover your cough.  o Wash your hands well and often.    Resources    [facility]: About COVID-19: [email or web address]    CDC: What to Do If You're Sick: www.cdc.gov/coronavirus/2019-ncov/about/steps-when-sick.html    CDC: Ending Home Isolation: www.cdc.gov/coronavirus/2019-ncov/hcp/disposition-in-home-patients.html     CDC: Caring for Someone: www.cdc.gov/coronavirus/2019-ncov/if-you-are-sick/care-for-someone.html     [facility]: Interim Guidance for Hospital Discharge to Home: www.health.[facility].[place]/diseases/coronavirus/hcp/hospdischarge.pdf    [facility] clinical trials (COVID-19 research studies):  clinicalaffairs.[facility]-clinical-trials     Below are the COVID-19 hotlines at the [place] [facility] ([facility]). Interpreters are available.   o For health questions: Call 857-883-3073 or 1-207.370.7771 (7 a.m. to 7 p.m.)  o For questions about schools and childcare: Call 889-337-6935 or 1-384.476.2363 (7 a.m. to 7 p.m.)       Reason for Disposition    HIGH RISK patient (e.g., age > 64 years, diabetes, heart or lung disease, weak immune system)    Additional Information    Negative: SEVERE difficulty breathing (e.g., struggling for each breath, speaks in single words)    Negative: Difficult to awaken or acting confused (e.g., disoriented, slurred speech)    Negative: Bluish (or gray) lips or face now    Negative: Shock suspected (e.g., cold/pale/clammy skin, too weak to stand, low BP, rapid pulse)    Negative: Sounds like a life-threatening emergency to the triager    Negative: [1] COVID-19 exposure AND [2] no symptoms    Negative: COVID-19 and Breastfeeding, questions about    Negative: [1] Adult with possible COVID-19 symptoms AND [2] triager concerned about severity of symptoms or other causes    Negative: SEVERE or constant chest pain or pressure (Exception: mild central chest pain, present only when coughing)    Negative: MODERATE difficulty breathing (e.g., speaks in phrases, SOB even at rest, pulse 100-120)    Negative: Patient sounds very sick or weak to the triager    Negative: MILD difficulty breathing (e.g., minimal/no SOB at rest, SOB with walking, pulse <100)    Negative: Chest pain or pressure    Negative: Fever > 103 F (39.4 C)    Negative: [1] Fever > 101 F (38.3 C) AND [2] age > 60    Negative: [1] Fever > 100.0 F (37.8 C) AND [2] bedridden (e.g., nursing home patient, CVA, chronic illness, recovering from surgery)    Protocols used: CORONAVIRUS (COVID-19) DIAGNOSED OR [id number] 5.16.20

## 2021-06-09 NOTE — ANESTHESIA PROCEDURE NOTES
Peripheral Block    Patient location during procedure: pre-op  Start time: 3/14/2017 8:57 AM  End time: 3/14/2017 9:07 AM  post-op analgesia per surgeon order as noted in medical record  Staffing:  Performing  Anesthesiologist: ANTONIA MILLER  Preanesthetic Checklist  Completed: patient identified, site marked, risks, benefits, and alternatives discussed, timeout performed, consent obtained, airway assessed, oxygen available, suction available, emergency drugs available and hand hygiene performed  Peripheral Block  Block type: other, tibial  Prep: ChloraPrep  Patient position: supine  Patient monitoring: blood pressure, continuous pulse oximetry and cardiac monitor  Laterality: right  Injection technique: ultrasound guided    Ultrasound used to visualize needle placement in proximity to nerve being blocked: yes   Permanent ultrasound image captured for medical record    Needle  Needle type: Stimuplex   Needle gauge: 21 G  Needle length: 4 in  no peripheral nerve catheter placed  Assessment  Injection assessment: no difficulty with injection, negative aspiration for heme, no paresthesia on injection and incremental injection

## 2021-06-09 NOTE — PROGRESS NOTES
ASSESSMENT:  1.  Left lower leg edema:  She should be screened for deep venous thrombosis.  However, I suspect this is related to venous insufficiency with some asymmetry.  She has had a moderate elevation in the BNP in the past presumably related to diastolic cardiac dysfunction.  This will be checked.  She has no symptoms to suggest worsening heart failure.  2.  Paroxysmal atrial fibrillation:  Clinically she is in sinus rhythm.  She is on Xarelto which would make a much less likely  PLAN:  1.  Labs as outlined.  She'll be notified of test results.  2.  Compressive stockings to the knees, low salt diet, leg elevation.  3.  Continue furosemide.  She may take an extra tablet if edema gets more severe    Orders Placed This Encounter   Procedures     BNP(B-type Natriuretic Peptide)     D-dimer, Quantitative     HM2(CBC w/o Differential)     Albumin     Medications Discontinued During This Encounter   Medication Reason     raloxifene (EVISTA) 60 mg tablet Duplicate order     levothyroxine (SYNTHROID, LEVOTHROID) 75 MCG tablet Duplicate order     azithromycin (ZITHROMAX) 250 MG tablet Therapy completed     lidocaine (XYLOCAINE) 5 % ointment Therapy completed       No Follow-up on file.    ASSESSED PROBLEMS:  1. Edema  BNP(B-type Natriuretic Peptide)    D-dimer, Quantitative    HM2(CBC w/o Differential)    Albumin   2. Shortness of breath  BNP(B-type Natriuretic Peptide)       CHIEF COMPLAINT:  Chief Complaint   Patient presents with     Foot Swelling     left foot swelling since friday       HISTORY OF PRESENT ILLNESS:  Radha is a 81 y.o. female presenting to the clinic today with concerns of swelling in her left leg.    Last week she noticed increased swelling in left lower leg and feet. She denies pain of the left leg. She is unsure if her left leg typically swells more than her right. She denies chest pain, shortness of breath, or dyspnea. She had called the nurse triage line on 2/17/17, and was advised to increase  "her furosemide dose. She took 80 mg of furosemide on Friday and Saturday. Her swelling improved overall over the weekend, but her left leg is still somewhat swollen. She does not currently wear compression stockings.     REVIEW OF SYSTEMS:   She will be having her right knee replaced in March. She hopes to walk around her neighborhood after surgery. All other systems are negative.    PFSH:  She moved into a new apartment building in September.     TOBACCO USE:  History   Smoking Status     Never Smoker   Smokeless Tobacco     Never Used     Comment: no second hand smoke exposure       VITALS:  Vitals:    02/21/17 1132   BP: 112/80   Patient Site: Left Arm   Patient Position: Sitting   Cuff Size: Adult Regular   Pulse: 80   Weight: 146 lb 14.4 oz (66.6 kg)   Height: 5' 2.5\" (1.588 m)     Wt Readings from Last 3 Encounters:   02/21/17 146 lb 14.4 oz (66.6 kg)   11/30/16 148 lb (67.1 kg)   10/18/16 153 lb 11.2 oz (69.7 kg)     Body mass index is 26.44 kg/(m^2).    PHYSICAL EXAM:  Constitutional:   Reveals an alert, pleasant woman. Affect appropriate. She limps favoring her right leg.   Vitals: per nursing notes.  HEENT: Atraumatic.    Neck:  Supple, no adenopathy or thyromegaly.   Lungs: Clear to A&P.  Cardiac:   Regular rate and rhythm, normal S1, S2, no murmur.  Extremities:  1+ edema of the left leg, trace edema of the right. Minor stasis changes at the ankles. No foot ulcers, no palpable chords. Negative Homans' sign.   Skin:  No jaundice, peripheral cyanosis or lesions to suggest malignancy.    ADDITIONAL HISTORY SUMMARIZED (2): Reviewed nurse triage note from 2/17/17 regarding leg swelling.   DECISION TO OBTAIN EXTRA INFORMATION (1): None.   RADIOLOGY TESTS (1): None.  LABS (1): Ordered labs today.   MEDICINE TESTS (1): None.  INDEPENDENT REVIEW (2 each): None.     The visit lasted a total of 13 minutes face to face with the patient. Over 50% of the time was spent counseling and educating the patient about leg " swelling.    I, Jeannine Leon, am scribing for and in the presence of, Dr. Rodriguez.    I, Dr. Rodriguez, personally performed the services described in this documentation, as scribed by Jeannine Leon in my presence, and it is both accurate and complete.    Dragon dictation was used for this note.  Speech recognition errors are a possibility.    MEDICATIONS:  Current Outpatient Prescriptions   Medication Sig Dispense Refill     ascorbic acid, vitamin C, (ASCORBIC ACID WITH MILENA HIPS) 500 MG tablet Take 500 mg by mouth daily.       calcium carbonate-vitamin D2 500 mg(1,250mg) -200 unit tablet Take 1 tablet by mouth daily.       cholecalciferol, vitamin D3, (VITAMIN D3) 2,000 unit Tab Take 1,000 Units by mouth.        citalopram (CELEXA) 20 MG tablet Take 20 mg by mouth daily.       fluticasone (FLONASE) 50 mcg/actuation nasal spray 1 spray into each nostril as needed.        FOLIC ACID/MULTIVIT-MIN/LUTEIN (CENTRUM SILVER ORAL) Take 1 tablet by mouth daily.       furosemide (LASIX) 40 MG tablet Take 0.5 tablets (20 mg total) by mouth daily. (Patient taking differently: Take 40 mg by mouth daily. ) 90 tablet 3     levothyroxine (SYNTHROID, LEVOTHROID) 75 MCG tablet TAKE 1 TABLET BY MOUTH EVERY DAY 90 tablet 1     pantoprazole (PROTONIX) 40 MG tablet Take 40 mg by mouth daily.       raloxifene (EVISTA) 60 mg tablet TAKE 1 TABLET BY MOUTH DAILY 90 tablet 3     simvastatin (ZOCOR) 20 MG tablet TAKE 1 TABLET BY MOUTH DAILY 90 tablet 2     sotalol (BETAPACE) 80 MG tablet Take 1 tablet (80 mg total) by mouth 2 (two) times a day. 180 tablet 3     traMADol (ULTRAM) 50 mg tablet One to two tabs four times daily as needed for pain 50 tablet 1     XARELTO 20 mg Tab TAKE 1 TABLET BY MOUTH DAILY WITH DINNER 90 tablet 1     No current facility-administered medications for this visit.        Total data points: 3

## 2021-06-09 NOTE — ANESTHESIA PROCEDURE NOTES
Peripheral Block    Patient location during procedure: pre-op  Start time: 3/14/2017 9:07 AM  End time: 3/14/2017 9:10 AM  post-op analgesia per surgeon order as noted in medical record  Staffing:  Performing  Anesthesiologist: ANTONIA MILLER  Preanesthetic Checklist  Completed: patient identified, site marked, risks, benefits, and alternatives discussed, timeout performed, consent obtained, at patient's request, airway assessed, oxygen available, suction available, emergency drugs available and hand hygiene performed  Peripheral Block  Block type: saphenous  Prep: ChloraPrep  Patient position: supine  Patient monitoring: cardiac monitor, continuous pulse oximetry, blood pressure and heart rate  Laterality: right  Injection technique: ultrasound guided    Ultrasound used to visualize needle placement in proximity to nerve being blocked: yes   Permanent ultrasound image captured for medical record    Needle  Needle type: Stimuplex   Needle gauge: 21 G  Needle length: 4 in  no peripheral nerve catheter placed  Assessment  Injection assessment: negative aspiration for heme, no difficulty with injection, no paresthesia on injection and incremental injection

## 2021-06-09 NOTE — PROGRESS NOTES
"Radha Arnold is a 84 y.o. female who is being evaluated via a billable telephone visit.      The patient has been notified of following:     \"This telephone visit will be conducted via a call between you and your physician/provider. We have found that certain health care needs can be provided without the need for a physical exam.  This service lets us provide the care you need with a short phone conversation.  If a prescription is necessary we can send it directly to your pharmacy.  If lab work is needed we can place an order for that and you can then stop by our lab to have the test done at a later time.    Telephone visits are billed at different rates depending on your insurance coverage. During this emergency period, for some insurers they may be billed the same as an in-person visit.  Please reach out to your insurance provider with any questions.    If during the course of the call the physician/provider feels a telephone visit is not appropriate, you will not be charged for this service.\"    Patient has given verbal consent to a Telephone visit? Yes    What phone number would you like to be contacted at? [phone]    Patient would like to receive their AVS by AVS Preference: Mail a copy.    Additional provider notes:     Assessment/Plan:  1. Suspected COVID-19 virus infection  Radha reportsaf fever up to 101.2 yesterday.  She notes some minor headache and fatigue, she has not had cough, dysuria, rash or abdominal pain.  She had been playing yard games the day before, but had no known exposure to COVID-19.  She reports she is now afebrile, but had a temp of 100.7 this morning.  I would favor checking a PCR test for Covid-19.  I do not see a role for empiric antibiotics.  - Symptomatic COVID-19 Virus (CORONAVIRUS) PCR; Future    2.  Health maintenance:  She is due for an annual wellness visit.  Thyroid function has not been checked since April 2019.    Plan:  1.  Check PCR test for COVID-19    2.  " Symptomatic treatment for febrile illness with Tylenol, fluids, and rest.    3.  Self quarantine until COVID results are back.    4.  Schedule annual wellness exam in the fall.  Thyroid function will be rechecked then.    Orders Placed This Encounter   Procedures     Symptomatic COVID-19 Virus (CORONAVIRUS) PCR     Standing Status:   Future     Standing Expiration Date:   7/14/2021     Order Specific Question:   Asymptomatic or Symptomatic:     Answer:   Symptomatic     Order Specific Question:   Reason?     Answer:   No Procedure     Order Specific Question:   Preferred testing location     Answer:   [place]     There are no discontinued medications.    Return in about 3 months (around 10/14/2020) for Annual physical.    ASSESSED PROBLEMS:  1. Suspected COVID-19 virus infection  Symptomatic COVID-19 Virus (CORONAVIRUS) PCR       CHIEF COMPLAINT:  Chief Complaint   Patient presents with     Cough     Fever       HISTORY OF PRESENT ILLNESS:  Radha is a 84 y.o. female who presents for a telephone visit with concerns about febrile illness.  She states she began to feel ill yesterday.  She had a temp of 101.2.  She also noted minor headache fatigue and myalgias.  She has not had a cough shortness of cough, shortness of breath, dysuria, abdominal pain or rash.  She has been playing [email or web address] games the day before but had tried to maintain social distance.  She has no known exposure COVID.  She does not go out of her home very frequently.    She otherwise has done fairly well since she was last seen.  Thyroid function was last checked in April 2019.  She is due for an annual wellness visit    REVIEW OF SYSTEMS:    Comprehensive review of systems is otherwise negative.    PFSH:  .  Lives in a seniors facility.  Still drives a car.    TOBACCO USE:  Social History     Tobacco Use   Smoking Status Former Smoker     Packs/day: 0.50     Years: 20.00     Pack years: 10.00     Types: Cigarettes     Last attempt to quit: 1/1/1976      Years since quittin.5   Smokeless Tobacco Never Used   Tobacco Comment    no second hand smoke exposure       VITALS:  There were no vitals filed for this visit.  Wt Readings from Last 3 Encounters:   20 155 lb (70.3 kg)   19 153 lb (69.4 kg)   19 152 lb 6.4 oz (69.1 kg)       PHYSICAL EXAM:  Constitutional:   Reveals an alert, pleasant, healthy sounding woman who does not sound in major distress  Vitals: per nursing notes.  Psychiatric:  Memory intact, mood appropriate.      The visit lasted a total of 18 minutes face to face with the patient. Over 50% of the time was spent counseling and educating the patient about evaluation and management of febrile illness..      Dragon dictation was used for this note. Speech recognition errors are a possibility.     MEDICATIONS:  Current Outpatient Medications   Medication Sig Dispense Refill     acetaminophen (TYLENOL) 325 MG tablet Take 650 mg by mouth every 6 (six) hours as needed for pain.       cholecalciferol, vitamin D3, (VITAMIN D3) 2,000 unit Tab Take 2,000 Units by mouth daily.        citalopram (CELEXA) 20 MG tablet TAKE 1 TABLET(20 MG) BY MOUTH DAILY 90 tablet 2     fluticasone furoate-vilanterol (BREO ELLIPTA) 100-25 mcg/dose DsDv inhaler Inhale 1 Dose daily.       furosemide (LASIX) 40 MG tablet Take 2 tablets (80 mg total) by mouth daily. 180 tablet 2     latanoprost (XALATAN) 0.005 % ophthalmic solution Administer 1 drop to both eyes at bedtime.   12     levothyroxine (SYNTHROID, LEVOTHROID) 75 MCG tablet TAKE 1 TABLET(75 MCG) BY MOUTH DAILY 90 tablet 3     pantoprazole (PROTONIX) 40 MG tablet TAKE 1 TABLET(40 MG) BY MOUTH DAILY 90 tablet 3     potassium chloride (K-DUR,KLOR-CON) 20 MEQ tablet Take 1 tablet (20 mEq total) by mouth daily. 90 tablet 3     rivaroxaban 20 mg Tab Take 1 tablet (20 mg total) by mouth daily with supper. 90 tablet 3     simvastatin (ZOCOR) 20 MG tablet TAKE 1 TABLET(20 MG) BY MOUTH AT BEDTIME 90 tablet 3      sotalol (BETAPACE) 80 MG tablet Take 1 tablet (80 mg total) by mouth 2 (two) times a day. 180 tablet 3     triamcinolone (KENALOG) 0.1 % cream Apply 1 application topically 2 (two) times a day as needed (Rash).       No current facility-administered medications for this visit.          Phone call duration:  18 minutes    Gary H Knudsen, MD [facility], [facility]

## 2021-06-09 NOTE — PROGRESS NOTES
Radha: The COVID 19 test returned negative.  Let me know if you are not feeling better.    Brando Rodriguez MD

## 2021-06-09 NOTE — PROGRESS NOTES
[facility]      Facility:    [facility] [[id number]]  Code Status: FULL CODE      Chief Complaint/Reason for Visit:  Chief Complaint   Patient presents with     H & P       HPI:   Radha is a 81 y.o. female who Was experiencing pain in the right knee Secondary to osteoarthritis.  On 14 March she underwent right total knee arthroplasty.  There were no perioperative complications.  She's been transferred to the TCU for rehabilitation    Past Medical History:  Past Medical History:   Diagnosis Date     Adjustment disorder      Arthritis      Atrial fibrillation      CHF (congestive heart failure) 7/15/2016     Gall stones      Hemorrhoids      History of anesthesia complications     slow to wake up      HLD (hyperlipidemia)      HTN (hypertension)      Hypothyroid      IBS (irritable bowel syndrome)      Migraine      Mitral valve stenosis      Nephrolithiasis      Osteopenia      Paroxysmal atrial fibrillation      Sleep apnea            Surgical History:  Past Surgical History:   Procedure Laterality Date     CHOLECYSTECTOMY       RI REMOVAL GALLBLADDER      Description: Cholecystectomy;  Recorded: 06/09/2009;     RI TOTAL KNEE ARTHROPLASTY Right 3/14/2017    Procedure: RIGHT TOTAL KNEE ARTHROPLASTY;  Surgeon: Peña Chacon MD;  Location: [facility] OR;  Service: Orthopedics     TUBAL LIGATION         Family History:   Family History   Problem Relation Age of Onset     Heart disease Mother      Hyperlipidemia Mother      Breast cancer Mother      Heart disease Father      Hyperlipidemia Father      Cancer Sister      Hyperlipidemia Sister      Breast cancer Paternal Aunt        Social History:    Social History     Social History     Marital status:      Spouse name: N/A     Number of children: N/A     Years of education: N/A     Social History Main Topics     Smoking status: Never Smoker     Smokeless tobacco: Never Used      Comment: no second hand smoke  exposure     Alcohol use 0.6 - 1.2 oz/week     1 - 2 Glasses of wine per week     Drug use: No     Sexual activity: Not on file     Other Topics Concern     Not on file     Social History Narrative          Review of Systems   Constitutional: Negative for fatigue, fever and unexpected weight change.   HENT: Negative for congestion, drooling, hearing loss, rhinorrhea, sore throat and trouble swallowing.    Eyes: Negative for discharge, redness and visual disturbance.   Respiratory: Positive for shortness of breath. Negative for cough and wheezing.    Cardiovascular: Negative for chest pain, palpitations and leg swelling.        History atrial fibrillation   Gastrointestinal: Negative for abdominal distention, constipation, diarrhea, nausea and vomiting.   Endocrine:        Reated hypothyroidism   Genitourinary: Negative for difficulty urinating, dysuria, enuresis, frequency and urgency.   Musculoskeletal: Negative for back pain, gait problem and joint swelling.        Osteopenia   Skin: Negative for color change, pallor and rash.   Allergic/Immunologic: Negative for immunocompromised state.   Neurological: Negative for tremors, weakness, light-headedness and headaches.   Hematological: Does not bruise/bleed easily.        Anemia   Psychiatric/Behavioral: Positive for dysphoric mood. Negative for agitation, behavioral problems, confusion and sleep disturbance. The patient is not nervous/anxious.        Vital signs reviewed since admission.  Elevated temperature last night normal this morning.  The low blood pressure on admission no normal.  3 pound weight losssince admission to the TCU      Physical Exam   Constitutional: She is oriented to person, place, and time. She appears well-developed and well-nourished. She is cooperative. She is easily aroused. No distress.   HENT:   Head: Normocephalic and atraumatic.   Right Ear: External ear normal.   Left Ear: External ear normal.   Nose: Nose normal.   Eyes: Conjunctivae  "and EOM are normal. Pupils are equal, round, and reactive to light. Right eye exhibits no discharge. Left eye exhibits no discharge. No scleral icterus.   Neck: Phonation normal. No JVD present. No tracheal deviation present. No thyroid mass and no thyromegaly present.   Cardiovascular: Normal rate, regular rhythm, normal heart sounds and intact distal pulses.  Exam reveals no gallop and no friction rub.    No murmur heard.  Pulmonary/Chest: Effort normal and breath sounds normal. No stridor. No respiratory distress. She has no wheezes. She has no rales.   On entering the room, patient appears to be panting and breathless.  when Engaged in conversation,her breathing becomes more relaxed and even.  When asked about her breathlessness she states that she worked with physical therapy and is \"worn-out\".    Lung sounds are clear throughout both hemithoraces   Abdominal: Soft. Bowel sounds are normal. She exhibits no distension. There is no tenderness. There is no rebound and no guarding.   Musculoskeletal: She exhibits no edema.   Lymphadenopathy:     She has no cervical adenopathy.   Neurological: She is alert, oriented to person, place, and time and easily aroused. Coordination normal.   Skin: Skin is warm and dry. No rash noted. She is not diaphoretic. No erythema. No pallor.   Psychiatric: She has a normal mood and affect. Her behavior is normal. Thought content normal.   Vitals reviewed.      Medication List:  Current Outpatient Prescriptions   Medication Sig     acetaminophen (TYLENOL EXTRA STRENGTH) 500 MG tablet Take 2 tablets (1,000 mg total) by mouth every 8 (eight) hours as needed for pain.     cholecalciferol, vitamin D3, (VITAMIN D3) 2,000 unit Tab Take 2,000 Units by mouth daily.      citalopram (CELEXA) 20 MG tablet Take 20 mg by mouth daily.     furosemide (LASIX) 20 MG tablet Take 20 mg by mouth daily.     levothyroxine (SYNTHROID, LEVOTHROID) 75 MCG tablet Take 75 mcg by mouth daily.     oxyCODONE " (ROXICODONE) 5 MG immediate release tablet Take 1-2 tablets (5-10 mg total) by mouth every 4 (four) hours as needed for pain. 1 tab for pain 1-6/10, 2 tabs pain 7-10/10.     pantoprazole (PROTONIX) 40 MG tablet Take 40 mg by mouth daily.     raloxifene (EVISTA) 60 mg tablet Take 60 mg by mouth daily.     rivaroxaban 20 mg Tab Take 1 tablet (20 mg total) by mouth daily with supper.     senna-docusate (SENNOSIDES-DOCUSATE SODIUM) 8.6-50 mg tablet Take 1 tablet by mouth 2 (two) times a day. Take daily for constipation or while on narcotics. Hold if developing loose stools     simvastatin (ZOCOR) 20 MG tablet Take 20 mg by mouth daily.     sotalol (BETAPACE) 80 MG tablet Take 1 tablet (80 mg total) by mouth 2 (two) times a day.     traMADol (ULTRAM) 50 mg tablet Take  mg by mouth every 6 (six) hours as needed for pain.       Labs:  Results for orders placed or performed during the hospital encounter of 03/14/17   Basic Metabolic Panel   Result Value Ref Range    Sodium 138 136 - 145 mmol/L    Potassium 4.2 3.5 - 5.0 mmol/L    Chloride 107 98 - 107 mmol/L    CO2 24 22 - 31 mmol/L    Anion Gap, Calculation 7 5 - 18 mmol/L    Glucose 145 (H) 70 - 125 mg/dL    Calcium 8.3 (L) 8.5 - 10.5 mg/dL    BUN 17 8 - 28 mg/dL    Creatinine 0.68 0.60 - 1.10 mg/dL    GFR MDRD Af Amer >60 >60 mL/min/1.73m2    GFR MDRD Non Af Amer >60 >60 mL/min/1.73m2     Lab Results   Component Value Date    HGB 10.5 (L) 03/16/2017    HGB 10.3 (L) 03/15/2017    HGB 13.1 02/28/2017    HGB 13.2 02/21/2017    HGB 12.7 08/08/2016    HGB 12.3 07/20/2016     Lab Results   Component Value Date    TSH 2.51 07/16/2016             Assessment:    ICD-10-CM    1. Osteoarthritis of right knee M17.9 Symptomatic osteoarthritis gradually getting worse.  Underwent right total knee replacement on 14 March.  No reported complications.   2. Paroxysmal atrial fibrillation I48.0 Treated with the sotalol 60 mg twice daily with good control, and Xarelto.  History of  heart failure treated with furosemide.  No current clinical signs of failure   3. Hypothyroidism E03.9 L-thyroxine 75  g daily.  TSH 2.51 in July   4. GERD (gastroesophageal reflux disease) K21.9 Symptoms relieved with pantoprazole 40 mg daily.  Denies significant weight loss   5. Mood disorder F39 Currently taking citalopram 20 mg daily which preceded her hospitalization.  Patient appears quite fearful of pain and anxious about any changes in her pain regimen.  Extended discussion regarding the need to get adequate pain relief but to consider strategies to gradually wean herself from oxycodone within the next 2 to 3 weeks.   6. Anemia, blood loss D50.0 Preop hemoglobin 13.  Post op hemoglobin 10.5.  Plan to recheck next week and to start on oral iron   7. Osteopenia M85.80 Taking vitamin D and raloxifine  Admits that she oesn't remember to take calcium supplements that hae been prescribed.Will discontinue vitamin D3.  Begin calcium plus D 600/400 twice daily.       Plan:  PT's/OT for knee rehabilitation.  Begin calcium supplement plus D.  Begin oral iron.  Monitor pain medication use.      Electronically signed by: Job Banuelos MD

## 2021-06-09 NOTE — TELEPHONE ENCOUNTER
Question following Office Visit  When did you see your provider: 07/14/20  What is your question: Patient states she had virtual visit with provider yesterday today her symptoms changed temperature is 100.6 ,Headache, Fatigue no shortness of breath or chest congestion per patient  she was not this bad yesterday camper to today' symptoms . Patient is requesting providers suggestions .  Okay to leave a detailed message: No

## 2021-06-09 NOTE — PROGRESS NOTES
ASSESSMENT:  1.  Preoperative assessment prior to right total knee replacement for advanced osteoarthritis:  Radha appears medically stable for the planned procedure.  2.  History of paroxysmal atrial fibrillation:  She has not noted any recent symptomatic palpitations.  Clinically she is in sinus rhythm.  Sotalol should be continued through the perioperative course.  Xarelto will be held preoperatively  3.  Gastroesophageal reflux:  Good control with pantoprazole.  4.  Hypothyroidism with past history of Graves' disease:  She is clinically euthyroid.  TSH was in the desired range when last checked in July.  She will continue her current levothyroxine dose.  5.  History of diastolic heart failure:  She currently is well compensated.  Recent BNP was mildly elevated at 316.  Care should be taken to avoid fluid overload.  6.  Low bone mass:  Her lowest T score was -2.0 in May 2015.  She has been on raloxifene  PLAN:  1.  EKG from November 2016 was reviewed.  This showed normal sinus rhythm with a slightly prolonged QT.  2.  Basic metabolic panel and hemogram were drawn for med monitoring and to assess preoperative risk for anesthesia.  Vitamin D level was drawn to monitor her vitamin D supplement.  3.  Stop Xarelto 4  Days preoperatively.  4.  Morning of surgery takes sotalol.  Skip other medications and resume postoperatively.  5.  She is planning on a TCU after surgery since she lives alone.  6.  No contraindications to the planned surgery are noted.    Orders Placed This Encounter   Procedures     Basic Metabolic Panel     HM2(CBC w/o Differential)     Electrocardiogram Perform and Read     There are no discontinued medications.    No Follow-up on file.    ASSESSED PROBLEMS:  1. Preop exam for internal medicine  Electrocardiogram Perform and Read    Basic Metabolic Panel    HM2(CBC w/o Differential)   2. Paroxysmal atrial fibrillation  Electrocardiogram Perform and Read       CHIEF COMPLAINT:  Chief Complaint    Patient presents with     Pre-op Exam     right knee replacement, Dr. Chacon, [facility], Fax:  [phone]       HISTORY OF PRESENT ILLNESS:  Radha is a 81 y.o. female here for an internal medicine pre-operative consultation. The exam is requested by Dr. Chacon in preparation for total right knee replacment to be performed at [facility] on 3/14/17. Today s examination on 2/28/2017 is done to review the underlying surgical condition of osteoarthritis of the right knee, clear for anesthesia, and review medical problems with appropriate changes in medications. There was no injury to the right knee. Her left knee is doing well. She will go to transitional care following her surgery. There are no stairs in her apartment.     Radha has tolerated previous surgeries well without bleeding or anesthesia difficulty.    REVIEW OF SYSTEMS:   Her breathing is fine. She has not noticed heart fluttering or palpitations. She does not take a daily aspirin or fish oil. All other systems are negative.    PFSH:  She is living in an apartment. She has a daughter and son living in town.   History   Smoking Status     Never Smoker   Smokeless Tobacco     Never Used     Comment: no second hand smoke exposure       Family History   Problem Relation Age of Onset     Heart disease Mother      Hyperlipidemia Mother      Breast cancer Mother      Heart disease Father      Hyperlipidemia Father      Cancer Sister      Hyperlipidemia Sister      Breast cancer Paternal Aunt        Past Surgical History:   Procedure Laterality Date     CHOLECYSTECTOMY       CO REMOVAL GALLBLADDER      Description: Cholecystectomy;  Recorded: 06/09/2009;     TUBAL LIGATION         Allergies   Allergen Reactions     Penicillins Hives       Active Ambulatory Problems     Diagnosis Date Noted     Nephrolithiasis      Migraine Headache      Diverticulosis      Irritable Bowel Syndrome      Osteoarthritis Of The Knee      Palpitations      Colloid Nontoxic Multinodular  "Goiter      Adjustment Disorder      Obstructive Sleep Apnea      Female Stress Incontinence      Osteopenia      Hyperlipidemia      Urinary Frequency More Than Twice At Night (Nocturia)      Hypothyroidism      Venous Insufficiency      Osteoarthritis      Muscle Cramps In The Calf      Edema      Lower Back Pain      Vitamin D Deficiency      Contusion Of The Right Chest Wall With Intact Skin Surface      Muscle Aches, Generalized (Myalgias)      Lymphocytic colitis      Paroxysmal atrial fibrillation 07/15/2016     Essential hypertension with goal blood pressure less than 140/90      Mitral valve stenosis, mild      Acute diastolic heart failure      Chest tightness 07/20/2016     URI (upper respiratory infection) 10/18/2016     Resolved Ambulatory Problems     Diagnosis Date Noted     No Resolved Ambulatory Problems     Past Medical History:   Diagnosis Date     Arthritis      Atrial fibrillation      CHF (congestive heart failure) 7/15/2016     Gall stones      Hemorrhoids      HLD (hyperlipidemia)      HTN (hypertension)      Hypothyroid      IBS (irritable bowel syndrome)      Migraine      Mitral valve stenosis      Nephrolithiasis      Osteopenia      Paroxysmal atrial fibrillation      Sleep apnea        VITALS:  Vitals:    02/28/17 1044   BP: 122/78   Patient Site: Left Arm   Patient Position: Sitting   Cuff Size: Adult Regular   Pulse: 64   Temp: 97.6  F (36.4  C)   TempSrc: Tympanic   Weight: 146 lb 14.4 oz (66.6 kg)   Height: 5' 3\" (1.6 m)     Wt Readings from Last 3 Encounters:   02/28/17 146 lb 14.4 oz (66.6 kg)   02/21/17 146 lb 14.4 oz (66.6 kg)   11/30/16 148 lb (67.1 kg)       PHYSICAL EXAM:  Constitutional:   Reveals an alert pleasant talkative woman. Vitals: per nursing notes.  HEENT: Atraumatic.  Ears:  External canals, TMs clear.    Eyes:  Mild exophthalmos. PERRL.  Oropharynx:   Mouth and throat clear, no thrush or exudate.  Neck: Moderate thyromegaly. No bruits or adenopathy.  Back:  No " abnormal kyphosis.  Thorax:  No bony deformities.  Lungs: Clear to A&P without rales or wheezes.  Respiratory effort normal.  Cardiac:   Regular rate and rhythm, normal S1, S2, no murmur or gallop.  Breasts:   No masses.  Abdomen:  Right upper quadrant scar. No bruits, mass, or tenderness. Femoral pulses intact.   : Not done.   Extremities: Moderate crepitans to ROM right knee with genu valgus. Peripheral pulses intact. No significant edema.    Skin:  No lesions to suggest malignancy.  Neuro:  Alert and oriented. Cranial nerves, motor, sensory exams are intact.  No gross focal deficits.  EKG of 11/30/16: Normal sinus rhythm.      ADDITIONAL HISTORY SUMMARIZED (2): Reviewed note of [facility] 2/16/17.   DECISION TO OBTAIN EXTRA INFORMATION (1): None.   RADIOLOGY TESTS (1): None.  LABS (1): Labs ordered.   MEDICINE TESTS (1): None.  INDEPENDENT REVIEW (2 each): None.     The visit lasted a total of 24 minutes face to face with the patient. Over 50% of the time was spent counseling and educating the patient about her upcoming surgery.    IJermaine, am scribing for and in the presence of, Dr. Rodriguez.    I, Dr. Rodriguez, personally performed the services described in this documentation, as scribed by Jermaine Rubi in my presence, and it is both accurate and complete.    Dragon dictation was used for this note.  Speech recognition errors are a possibility.    MEDICATIONS:  Current Outpatient Prescriptions   Medication Sig Dispense Refill     ascorbic acid, vitamin C, (ASCORBIC ACID WITH MILENA HIPS) 500 MG tablet Take 500 mg by mouth daily.       calcium carbonate-vitamin D2 500 mg(1,250mg) -200 unit tablet Take 1 tablet by mouth daily.       cholecalciferol, vitamin D3, (VITAMIN D3) 2,000 unit Tab Take 1,000 Units by mouth.        citalopram (CELEXA) 20 MG tablet Take 20 mg by mouth daily.       fluticasone (FLONASE) 50 mcg/actuation nasal spray 1 spray into each nostril as needed.        FOLIC  ACID/MULTIVIT-MIN/LUTEIN (CENTRUM SILVER ORAL) Take 1 tablet by mouth daily.       furosemide (LASIX) 40 MG tablet Take 0.5 tablets (20 mg total) by mouth daily. (Patient taking differently: Take 40 mg by mouth daily. ) 90 tablet 3     levothyroxine (SYNTHROID, LEVOTHROID) 75 MCG tablet TAKE 1 TABLET BY MOUTH EVERY DAY 90 tablet 1     pantoprazole (PROTONIX) 40 MG tablet Take 40 mg by mouth daily.       raloxifene (EVISTA) 60 mg tablet TAKE 1 TABLET BY MOUTH DAILY 90 tablet 3     simvastatin (ZOCOR) 20 MG tablet TAKE 1 TABLET BY MOUTH DAILY 90 tablet 2     sotalol (BETAPACE) 80 MG tablet Take 1 tablet (80 mg total) by mouth 2 (two) times a day. 180 tablet 3     traMADol (ULTRAM) 50 mg tablet One to two tabs four times daily as needed for pain 50 tablet 1     XARELTO 20 mg Tab TAKE 1 TABLET BY MOUTH DAILY WITH DINNER 90 tablet 1     No current facility-administered medications for this visit.        Total data points: 3

## 2021-06-09 NOTE — TELEPHONE ENCOUNTER
Patient Returning Call  Reason for call:  Returning call.  Information relayed to patient:    Relayed below message to patient.  Patient has additional questions:  Yes  If YES, what are your questions/concerns:    Patient has not had COVID19 test done yet.  Transferred caller to COVID scheduling line.  Okay to leave a detailed message?: No call back needed

## 2021-06-10 NOTE — PROGRESS NOTES
ASSESSMENT:  1.  Hospital follow-up after right total knee replacement:  The incision looks clear shows no significant peripheral edema.  Her main issue is limited range of motion.  She was encouraged to work harder on extending this.  2.  Pain management:  She is alternating oxycodone and tramadol every 6 hours.  She was advised to taper back on oxycodone use.  However, she was encouraged to use this before therapy.  Adding in Tylenol with a maximum daily dose of 3000 mg was encouraged.  3.  Postoperative anemia:  Hemoglobin will be rechecked  4.  History of paroxysmal atrial fibrillation:  She remains on sotalol.  Clinically she is in sinus rhythm..  5.  History of diastolic heart failure:  Well compensated  I have reconciled all medications.     PLAN:  1.  Postoperative pain management was reviewed.  She will add in scheduled Tylenol.  Oxycodone use should be decreased.  However, she was encouraged to continue it before therapy  2.  Work harder to improve range of motion of the knee  3.  Labs as outlined for medication monitoring and follow-up of postoperative anemia.  4.  Clinic follow-up in 3 months or as needed  5.  Patient should be regarded as homebound.  She is at high falling risk and notes significant pain with mobility.  She would require a high degree of assistance to leave the home safely.  Requires physical therapy for gait, balance training and to improve range of motion.  Orders Placed This Encounter   Procedures     HM2(CBC w/o Differential)     Basic Metabolic Panel     Medications Discontinued During This Encounter   Medication Reason     vancomycin 1,000 mg in NaCl 0.9 % 100 mL (MINI-BAG Plus) (VANCOCIN)      oxyCODONE (ROXICODONE) 5 MG immediate release tablet Reorder       No Follow-up on file.    ASSESSED PROBLEMS:  1. Anemia  HM2(CBC w/o Differential)   2. Medication monitoring encounter  Basic Metabolic Panel   3. Post-operative pain  oxyCODONE (ROXICODONE) 5 MG immediate release tablet  "      CHIEF COMPLAINT:  Chief Complaint   Patient presents with     Follow-up     right knee replacement follow up       HISTORY OF PRESENT ILLNESS:  Radha is a 81 y.o. female presenting to the clinic today for an inpatient follow up. She is accompanied by her son and daughter who are visiting from [place]. She was admitted to [facility] on March 14 to undergo right TKA. She was discharged on March 17. Her ROM is at about 76 to 80 degrees. She has been walking around the house and riding a stationary bike for 8 minutes. She is participating in PT but has not been doing manipulations. The right knee is still painful at times. She continues to use the oxycodone and tramadol which ease pain a little. She had some constipation from the medications but it is improving. She has swelling of both legs and ankles but it is worse on the right.     REVIEW OF SYSTEMS:   Her family noticed some slurred speech initially when she started using narcotic pain medication. All other systems are negative.    PFSH:  Surgical history as above.     TOBACCO USE:  History   Smoking Status     Never Smoker   Smokeless Tobacco     Never Used     Comment: no second hand smoke exposure       VITALS:  Vitals:    04/10/17 1400   BP: 138/88   Patient Site: Left Arm   Patient Position: Sitting   Cuff Size: Adult Regular   Pulse: 80   Weight: 146 lb 14.4 oz (66.6 kg)   Height: 5' 3\" (1.6 m)     Wt Readings from Last 3 Encounters:   04/10/17 146 lb 14.4 oz (66.6 kg)   03/15/17 150 lb 9.6 oz (68.3 kg)   03/07/17 148 lb (67.1 kg)     Body mass index is 26.02 kg/(m^2).    PHYSICAL EXAM:  Constitutional:   Reveals an alert, pleasant, talkative, elderly woman. Affect appropriate. She came in with a walker.  Vitals: per nursing notes.  HEENT: Atraumatic.   Neck:  Supple, no carotid bruits or adenopathy.  Back:  No spine or CVA pain.  Thorax:  No bony deformities.  Lungs: Clear to A&P without rales or wheezes.  Respiratory effort normal.  Cardiac:   Regular " rate and rhythm, normal S1, S2, no murmur or gallop.  Abdomen:  Soft.  Extremities: Healing scar over the right knee, no purulent drainage detected, 1+ edema of the right lower leg, trace edema on the left.   Skin:  No abnormal pallor.     ADDITIONAL HISTORY SUMMARIZED (2): Hospital records from March 14-17, 2017 regarding TKA.   DECISION TO OBTAIN EXTRA INFORMATION (1): None.   RADIOLOGY TESTS (1): None.  LABS (1): Labs ordered.   MEDICINE TESTS (1): None.  INDEPENDENT REVIEW (2 each): None.     The visit lasted a total of 28 minutes face to face with the patient. Over 50% of the time was spent counseling and educating the patient about knee ROM.    ILindsey, am scribing for and in the presence of, Dr. Rodriguez.    I, Dr. Rodriguez, personally performed the services described in this documentation, as scribed by Lindsey Alcala in my presence, and it is both accurate and complete.    Dragon dictation was used for this note.  Speech recognition errors are a possibility.    MEDICATIONS:  Current Outpatient Prescriptions   Medication Sig Dispense Refill     cholecalciferol, vitamin D3, (VITAMIN D3) 2,000 unit Tab Take 2,000 Units by mouth daily.        citalopram (CELEXA) 20 MG tablet Take 20 mg by mouth daily.       furosemide (LASIX) 20 MG tablet Take 20 mg by mouth daily.       levothyroxine (SYNTHROID, LEVOTHROID) 75 MCG tablet Take 75 mcg by mouth daily.       oxyCODONE (ROXICODONE) 5 MG immediate release tablet TK 1 - 2 TS PO Q 4 - 6 HOURS PRN P 60 tablet 0     pantoprazole (PROTONIX) 40 MG tablet Take 40 mg by mouth daily.       raloxifene (EVISTA) 60 mg tablet Take 60 mg by mouth daily.       rivaroxaban 20 mg Tab Take 1 tablet (20 mg total) by mouth daily with supper. 40 tablet 1     senna-docusate (SENNOSIDES-DOCUSATE SODIUM) 8.6-50 mg tablet Take 1 tablet by mouth 2 (two) times a day. Take daily for constipation or while on narcotics. Hold if developing loose stools 20 tablet 1     simvastatin (ZOCOR) 20  MG tablet Take 20 mg by mouth daily.       sotalol (BETAPACE) 80 MG tablet Take 1 tablet (80 mg total) by mouth 2 (two) times a day. 180 tablet 3     traMADol (ULTRAM) 50 mg tablet Take  mg by mouth every 6 (six) hours as needed for pain.       No current facility-administered medications for this visit.        Total data points: 3

## 2021-06-10 NOTE — TELEPHONE ENCOUNTER
Refill Request  Did you contact pharmacy: Yes-The pharmacy is calling.  Medication name:   Requested Prescriptions     Pending Prescriptions Disp Refills     levothyroxine (SYNTHROID, LEVOTHROID) 75 MCG tablet 90 tablet 3     Who prescribed the medication: Brando Rodriguez MD   [facility]ed [facility]  Is patient out of medication: NA  Patient notified refills processed in 3 business days:  [facility]  Okay to leave a detailed message: yes

## 2021-06-11 NOTE — PROGRESS NOTES
[facility] Heart Care    Assessment/Plan:      Problem List Items Addressed This Visit     Obstructive Sleep Apnea    Paroxysmal atrial fibrillation - Primary    Essential hypertension with goal blood pressure less than 140/90    Acute diastolic heart failure        1.  Symptomatic paroxysmal atrial fibrillation: No significant symptomatic recurrence or evidence of atrial fibrillation.  We reviewed the physiology of natural progression of atrial fibrillation as well as treatment options of antiarrhythmic medications versus ablative therapies.  She was instructed to check her pulse daily and to keep a log of episodes of atrial fibrillation along with associated symptoms.  Continue sotalol 80 mg twice a day.    She was reassured that atrial fibrillation is not life-threatening, but does carry increased risk for stroke.  She has a [id number] score of at least 4 for age > 75, female gender, and hypertension; she is appropriately anticoagulated with Xarelto.  We discussed the importance of taking Xarelto at the same time every day with a full meal for proper absorption and full efficacy.  We also discussed alternatives to Xarelto including Eliquis, Pradaxa, and warfarin.  She would be a candidate for left atrial appendage occlusion with the watchman device if she were to have any issues on oral anticoagulation.  Continue Xarelto 20 mg daily.     2.  History of acute diastolic heart failure: She appears well compensated with no sign of fluid overload.  Continue furosemide 40 mg daily.    3.  Hypertension: Blood pressure at target today.    Follow-up with Dr. Paulson in 6 months.  Follow-up with me in 1 year.    Subjective:      Radha Arnold, a very pleasant 81-year-old woman, comes in today for follow-up of atrial fibrillation.  She was newly diagnosed with paroxysmal atrial fibrillation in July 2016 associated with acute fluid overload due to diastolic dysfunction.  Ventricular response was controlled with atenolol 50  mg daily which she has been on for many years for hypertension.  She was subsequently started on sotalol 80 mg twice daily.  Symptoms consisted of palpitations, chest tightness, ankle edema, and dyspnea on exertion.  Echocardiogram showed normal left ventricular systolic performance and nuclear stress test was negative for infarct or ischemia.  She was started on Xarelto 20 mg daily for stroke prophylaxis and reports no missed doses or bleeding issues.   Her medical record lists a history of obstructive sleep apnea, though she has never undergone a sleep study, is not known to snore, reports sleeping well, and denies daytime sleepiness.  She had another episode of chest tightness and ankle edema on 8/8/16 with evaluation in the ED.  It is likely she had an episode of atrial fibrillation that spontaneously converted prior to her arrival.    Radha has been feeling well.  She believes she had one episode of atrial fibrillation woke her up one night, but ended shortly thereafter.  This was her only episode in the past 9 months.  She had her left knee replaced in March and continues to rehabilitate.  She has had some mild swelling in her left ankle and the orthopedist started her on a Medrol Dosepak.  She denies exertional chest discomfort, frequent or sustained palpitations, shortness of breath, paroxysmal nocturnal dyspnea, orthopnea, lightheadedness, dizziness, pre-syncope, or syncope.      Medical, surgical, family, social history, and medications were reviewed and updated as necessary.    Patient Active Problem List   Diagnosis     Nephrolithiasis     Migraine Headache     Diverticulosis     Irritable Bowel Syndrome     Osteoarthritis Of The Knee     Palpitations     Colloid Nontoxic Multinodular Goiter     Adjustment Disorder     Obstructive Sleep Apnea     Female Stress Incontinence     Osteopenia     Hyperlipidemia     Hypothyroidism     Venous Insufficiency     Osteoarthritis     Edema     Lower Back Pain      Vitamin D deficiency     Lymphocytic colitis     Paroxysmal atrial fibrillation     Essential hypertension with goal blood pressure less than 140/90     Mitral valve stenosis, mild     Acute diastolic heart failure     Primary osteoarthritis of one knee, right       Past Medical History:   Diagnosis Date     Adjustment disorder      Arthritis      Atrial fibrillation      CHF (congestive heart failure) 7/15/2016     Gall stones      Hemorrhoids      History of anesthesia complications     slow to wake up      HLD (hyperlipidemia)      HTN (hypertension)      Hypothyroid      IBS (irritable bowel syndrome)      Migraine      Mitral valve stenosis      Nephrolithiasis      Osteopenia      Paroxysmal atrial fibrillation      Sleep apnea        Past Surgical History:   Procedure Laterality Date     CHOLECYSTECTOMY       ID REMOVAL GALLBLADDER      Description: Cholecystectomy;  Recorded: 06/09/2009;     ID TOTAL KNEE ARTHROPLASTY Right 3/14/2017    Procedure: RIGHT TOTAL KNEE ARTHROPLASTY;  Surgeon: Peña Chacon MD;  Location: [facility] OR;  Service: Orthopedics     TUBAL LIGATION         Allergies   Allergen Reactions     Penicillins Hives       Current Outpatient Prescriptions   Medication Sig Dispense Refill     cholecalciferol, vitamin D3, (VITAMIN D3) 2,000 unit Tab Take 2,000 Units by mouth daily.        citalopram (CELEXA) 20 MG tablet Take 20 mg by mouth daily.       furosemide (LASIX) 40 MG tablet Take 1 tablet (40 mg total) by mouth daily. 90 tablet 3     levothyroxine (SYNTHROID, LEVOTHROID) 75 MCG tablet TAKE 1 TABLET BY MOUTH EVERY DAY 90 tablet 3     methylPREDNISolone (MEDROL DOSEPACK) 4 mg tablet Take 4 mg by mouth 2 (two) times a day. follow package directions       pantoprazole (PROTONIX) 40 MG tablet Take 40 mg by mouth daily.       raloxifene (EVISTA) 60 mg tablet Take 60 mg by mouth daily.       simvastatin (ZOCOR) 20 MG tablet Take 20 mg by mouth daily.       sotalol (BETAPACE) 80 MG tablet  "Take 1 tablet (80 mg total) by mouth 2 (two) times a day. 180 tablet 3     XARELTO 20 mg Tab TAKE 1 TABLET BY MOUTH DAILY WITH DINNER 90 tablet 2     No current facility-administered medications for this visit.        Family History   Problem Relation Age of Onset     Heart disease Mother      Hyperlipidemia Mother      Breast cancer Mother      Heart disease Father      Hyperlipidemia Father      Cancer Sister      Hyperlipidemia Sister      Breast cancer Paternal Aunt        Social History   Substance Use Topics     Smoking status: Never Smoker     Smokeless tobacco: Never Used      Comment: no second hand smoke exposure     Alcohol use 0.6 - 1.2 oz/week     1 - 2 Glasses of wine per week       Review of Systems:   General: WNL  Eyes: WNL  Ears/Nose/Throat: WNL  Lungs: WNL  Heart: Irregular Heartbeat, Leg Swelling  Stomach: WNL  Bladder: WNL  Muscle/Joints: WNL  Skin: WNL  Nervous System: WNL  Mental Health: WNL     Blood: WNL      Objective:      /80 (Patient Site: Left Arm, Patient Position: Sitting, Cuff Size: Adult Regular)  Pulse 80  Resp 16  Ht 5' 3\" (1.6 m)  Wt 141 lb (64 kg)  BMI 24.98 kg/m2    Physical Exam  General Appearance:  Alert, well-appearing, in no acute distress.     HEENT:  Atraumatic, normocephalic; no scleral icterus, EOM intact; the mucous membranes were pink and moist.   Chest: Chest symmetric, spine straight.     Lungs:   Respirations unlabored; the lungs are clear to auscultation.   Cardiovascular:   Auscultation reveals normal first and second heart sounds with no murmurs, rubs, or gallops.  Regular rate and rhythm.  Radial and posterior tibial pulses are intact.    Abdomen:  Soft, nontender, nondistended, bowel sounds present.     Extremities: No cyanosis or clubbing.  Trace right ankle edema.   Musculoskeletal:  Moves all extremities.     Skin: No xanthelasma.   Neurologic: Mood and affect are appropriate. Oriented to person, place, time, and situation.   "     Cardiographics  ECG: 3/7/17 was personally reviewed, shows sinus rhythm at 78 beats per minutes with a first-degree A-V block, QT/QTc interval measures 424/483 ms, consistent with manual measurement.  ECG done 8/15/16 was also reviewed, shows sinus rhythm at 72 bpm, QT/QTC interval measures 450/492 ms, shorter on manual measurement.    Echocardiogram: Done 7/16/16  Normal left ventricular size.  Normal left ventricular wall thickness.  No regional wall motion abnormalities.  Left ventricular ejection fraction is visually estimated at 60%.  E/e' is >15, suggesting elevated LV filling pressures .  Normal right ventricular size with preserved systolic function.  Mild aortic regurgitation.  Moderate annular calcification.  Mild mitral stenosis with mean gradient of 4 mmHg.  Mild to Moderate mitral regurgitation is present.  There is mild tricuspid regurgitation with an estimated RVSP of 50 mm Hg.  When compared to previous study on 8-2-2013, right ventricular systolic  pressure is mildly elevated.    Lexiscan stress nuclear study done 7/21/16 is negative for inducible myocardial ischemia or infarction. The gated images reveal normal regional wall motion and global systolic performance. The measured left ventricular ejection fraction is greater than 70%.     Lab Review   Lab Results   Component Value Date    CREATININE 0.72 06/08/2017    BUN 14 06/08/2017     06/08/2017    K 4.4 06/08/2017     06/08/2017    CO2 23 06/08/2017     Lab Results   Component Value Date    WBC 5.9 04/10/2017    HGB 11.0 (L) 04/10/2017    HCT 33.5 (L) 04/10/2017    MCV 86 04/10/2017     04/10/2017             Bhavna Almazan, [facility], Electrophysiology  [phone]    This note has been dictated using voice recognition software. Any grammatical, typographical, or context distortions are unintentional and inherent to the software.

## 2021-06-11 NOTE — TELEPHONE ENCOUNTER
RN cannot approve Refill Request    RN can NOT refill this medication Protocol failed and NO refill given. Last office visit: 12/31/2019 Brando Rodriguez MD Last Physical: 2/28/2017 Last MTM visit: Visit date not found Last visit same specialty: 12/31/2019 Brando Rodriguez MD.  Next visit within 3 mo: Visit date not found  Next physical within 3 mo: Visit date not found      Maddie Medrano, Care Connection Triage/Med Refill 8/28/2020    Requested Prescriptions   Pending Prescriptions Disp Refills     levothyroxine (SYNTHROID, LEVOTHROID) 75 MCG tablet 90 tablet 3       Thyroid Hormones Protocol Failed - 8/26/2020 10:20 AM        Failed - TSH on file in past 12 months for patient age 12 & older     TSH   Date Value Ref Range Status   04/29/2019 1.94 0.30 - 5.00 uIU/mL Final                   Passed - Provider visit in past 12 months or next 3 months     Last office visit with prescriber/PCP: 12/31/2019 Brando Rodriguez MD OR same dept: 12/31/2019 Brando Rodriguez MD OR same specialty: 12/31/2019 Brando Rodriguez MD  Last physical: 2/28/2017 Last MTM visit: Visit date not found   Next visit within 3 mo: Visit date not found  Next physical within 3 mo: Visit date not found  Prescriber OR PCP: Brando Rodriguez MD  Last diagnosis associated with med order: 1. Hypothyroidism  - levothyroxine (SYNTHROID, LEVOTHROID) 75 MCG tablet  Dispense: 90 tablet; Refill: 3    If protocol passes may refill for 12 months if within 3 months of last provider visit (or a total of 15 months).

## 2021-06-11 NOTE — PROGRESS NOTES
ASSESSMENT:  1.  Increased peripheral edema: Radha has a history notable for edema due to venous insufficiency.  She also has had an elevated BNP in the past felt related to chronic diastolic dysfunction.  I would favor increasing her furosemide dose.  Has had paroxysmal atrial fibrillation in the past, but no findings to suggest that currently  2.  Paroxysmal atrial fibrillation: This seems under good control with sotalol  3.  Hypothyroidism on replacement: Clinically euthyroid.  TSH would be due at next clinic visit  PLAN:  1.  Increase furosemide to 40 mg daily.  Note effect on peripheral edema and exercise tolerance  2.  Check BNP and BMP today  3.  Compressive stockings to the knees when up.  Leg elevation.  Low-salt diet  4.  Clinic follow-up in 1 month or as needed  Orders Placed This Encounter   Procedures     Basic Metabolic Panel     BNP(B-type Natriuretic Peptide)     Medications Discontinued During This Encounter   Medication Reason     senna-docusate (SENNOSIDES-DOCUSATE SODIUM) 8.6-50 mg tablet      rivaroxaban 20 mg Tab      oxyCODONE (ROXICODONE) 5 MG immediate release tablet      levothyroxine (SYNTHROID, LEVOTHROID) 75 MCG tablet Duplicate order     traMADol (ULTRAM) 50 mg tablet Duplicate order     furosemide (LASIX) 20 MG tablet Dose adjustment           ASSESSED PROBLEMS:  1. Edema  BNP(B-type Natriuretic Peptide)   2. Diastolic CHF, chronic  Basic Metabolic Panel    BNP(B-type Natriuretic Peptide)       CHIEF COMPLAINT:  Chief Complaint   Patient presents with     Edema     both legs, mostly right leg. Had knee replacement March. Does have compression stockings on today. No pain with the swelling       HISTORY OF PRESENT ILLNESS:  Radha is a 81 y.o. female presenting to the clinic today with complaints of extremity edema. She states that for the past few days both legs have appeared swollen, though the swelling on the right is more prominent. She denies any pain associated with the swelling. Of  note, she had right total knee replacement this past March. Today, she states that swelling is relatively minor. She has been wearing compression stockings but does not think they help much. She has not felt dyspneic or short of breath but has noticed that she is breathing harder than she normally does. She takes 10 mg of furosemide daily.     REVIEW OF SYSTEMS:   She denies feeling acutely short of breath.   All other systems are negative.    PFSH:  She is enjoying the warm weather.     TOBACCO USE:  History   Smoking Status     Never Smoker   Smokeless Tobacco     Never Used     Comment: no second hand smoke exposure       VITALS:  Vitals:    06/08/17 1146   BP: 126/74   Patient Site: Left Arm   Patient Position: Sitting   Cuff Size: Adult Regular   Pulse: 71   Weight: 142 lb 8 oz (64.6 kg)     Wt Readings from Last 3 Encounters:   06/08/17 142 lb 8 oz (64.6 kg)   04/10/17 146 lb 14.4 oz (66.6 kg)   03/15/17 150 lb 9.6 oz (68.3 kg)       PHYSICAL EXAM:  Constitutional:   Reveals an alert, pleasant talkative female. Does not appear acutely ill. Affect appropriate  Vitals: per nursing notes.  HEENT:  Atraumatic.   Back:  No spine or CVA pain.  Thorax:  No bony deformities.  Lungs: Clear to A&P without rales or wheezes.  Respiratory effort normal.  Cardiac:   Regular rate and rhythm, normal S1, S2, no murmur or gallop.  Abdomen:  Soft, right upper quadrant scar.active bowel sounds without bruits, mass, or tenderness.  Extremities:   Healed scar over the right knee. Trace ankle edema left leg, 1+ ankle edema right leg, moderate varicosities both legs, pulses in the feet intact. Seen wearing compression stockings.   Skin:  No lesions to suggest malignancy.  Neuro:  Alert and oriented. Cranial nerves, motor, sensory exams are intact.  No gross focal deficits.  Psychiatric:  Memory intact, mood appropriate.    QUALITY MEASURES:  The following high BMI interventions were performed this visit: weight  monitoring    ADDITIONAL HISTORY SUMMARIZED (2): None.  DECISION TO OBTAIN EXTRA INFORMATION (1): Care everywhere accessed.   RADIOLOGY TESTS (1): None.  LABS (1): Labs ordered.  MEDICINE TESTS (1): None.  INDEPENDENT REVIEW (2 each): None.     The visit lasted a total of 11 minutes face to face with the patient. Over 50% of the time was spent counseling and educating the patient about peripheral edema.    I, Adrian Camargo, am scribing for and in the presence of, Dr. Rodriguez.    I, Dr. Rodriguez, personally performed the services described in this documentation, as scribed by Adrian Camargo in my presence, and it is both accurate and complete.    Dragon dictation was used for this note.  Speech recognition errors are a possibility.    MEDICATIONS:  Current Outpatient Prescriptions   Medication Sig Dispense Refill     cholecalciferol, vitamin D3, (VITAMIN D3) 2,000 unit Tab Take 2,000 Units by mouth daily.        citalopram (CELEXA) 20 MG tablet Take 20 mg by mouth daily.       levothyroxine (SYNTHROID, LEVOTHROID) 75 MCG tablet TAKE 1 TABLET BY MOUTH EVERY DAY 90 tablet 3     pantoprazole (PROTONIX) 40 MG tablet Take 40 mg by mouth daily.       raloxifene (EVISTA) 60 mg tablet Take 60 mg by mouth daily.       simvastatin (ZOCOR) 20 MG tablet Take 20 mg by mouth daily.       sotalol (BETAPACE) 80 MG tablet Take 1 tablet (80 mg total) by mouth 2 (two) times a day. 180 tablet 3     traMADol (ULTRAM) 50 mg tablet TAKE 1 TO 2 TABLETS BY MOUTH FOUR TIMES DAILY AS NEEDED FOR PAIN 50 tablet 0     XARELTO 20 mg Tab TAKE 1 TABLET BY MOUTH DAILY WITH DINNER 90 tablet 2     furosemide (LASIX) 40 MG tablet Take 1 tablet (40 mg total) by mouth daily. 90 tablet 3     No current facility-administered medications for this visit.        Total data points: 2.

## 2021-06-12 NOTE — TELEPHONE ENCOUNTER
Refill Approved    Rx renewed per Medication Renewal Policy. Medication was last renewed on 6/11/19  Last OV 7/14/2020 virtual  Chika Underwood, [facility] Connection Triage/Med Refill 10/8/2020     Requested Prescriptions   Pending Prescriptions Disp Refills     simvastatin (ZOCOR) 20 MG tablet [Pharmacy Med Name: SIMVASTATIN 20MG TABLETS] 90 tablet 3     Sig: TAKE 1 TABLET(20 MG) BY MOUTH AT BEDTIME       Statins Refill Protocol (Hmg CoA Reductase Inhibitors) Passed - 10/5/2020  3:46 PM        Passed - PCP or prescribing provider visit in past 12 months      Last office visit with prescriber/PCP: 12/31/2019 Brando Rodriguez MD OR same dept: 12/31/2019 Brando Rodriguez MD OR same specialty: 12/31/2019 Brando Rodriguez MD  Last physical: 2/28/2017 Last MTM visit: Visit date not found   Next visit within 3 mo: Visit date not found  Next physical within 3 mo: Visit date not found  Prescriber OR PCP: Brando Rodriguez MD  Last diagnosis associated with med order: 1. Hyperlipidemia  - simvastatin (ZOCOR) 20 MG tablet [Pharmacy Med Name: SIMVASTATIN 20MG TABLETS]; TAKE 1 TABLET(20 MG) BY MOUTH AT BEDTIME  Dispense: 90 tablet; Refill: 3    If protocol passes may refill for 12 months if within 3 months of last provider visit (or a total of 15 months).

## 2021-06-13 NOTE — PROGRESS NOTES
Patient seen in clinic for HF education s/p recent hospital discharge 0-00-00.  Reviewed HF Binder that includes the  HF Sx Awareness & Action plan  handout and  A Stronger Pump  booklet and Weight log booklet highlighting :  __X_patient s type of heart failure _X__Na management in diet  __X_importance of daily wts  _X__Fluid Guidelines, if applicable  __X_medication review and importance of compliance     Instructed patient in signs and sx of heart failure, reiterated when to call clinic - reviewed HF hotline # [phone] and after hours call # [phone].  Majority of time was spent reviewing: Type of heart failure, explained diastolic heart failure, low sodium diet, fluid restrictions,eating out and reading labels. Questions answered.  Patient verbalized understanding of HF discussion.  Plan for f/u with continued HF education reviewed.  No formal f/u scheduled with nurse clinician for continued education - will continue to reinforce HF management education.

## 2021-06-13 NOTE — PROGRESS NOTES
Assessment/Plan:     1. Heart failure with preserved ejection fraction, NYHA class III: Radha Arnold appears not compensated.  He states her dyspnea on exertion has worsened since discharge.  Her weights have remained stable.  She states she may have abdominal bloating and mild lower extremity edema.  We discussed heart failure, following a low salt diet, monitoring weights, and heart failure treatment. She met with a heart failure nurse clinician to discuss heart failure management.  She states she has been eating [employer] salads and we looked up the sodium content which is was 1200 mg per salad.  She was surprised by this number.  I increased her Lasix to 40 mg twice a day for 3 days.  I encouraged her to call the clinic on Monday with an update on how she is feeling.  She is traveling to [place] next week and we discussed tips on traveling with heart failure.  She will be staying with her daughter.    Follow-up with Dr. Paulson on December 11 as scheduled and in the heart failure clinic in 3 weeks or sooner if needed    Subjective:     Radha Arnold is seen at [facility] heart failure clinic today for post-hospitalization follow-up.  She was hospitalized at [facility] from October 21 - October 22, 2017 with acute on chronic heart failure.  Her BNP was 653.  She may have skipped her Lasix causing her to be hospitalized.  The most recent evaluation of Her ejection fraction was 65% from an  echo on 10/22/2017.  Her echocardiogram also showed mild to moderate mitral regurgitation, mild mitral stenosis, mild tricuspid regurgitation, and mild aortic stenosis.  Her past medical history is also significant for hypertension, paroxysmal atrial fibrillation and dyslipidemia.      Since being discharged from the hospital, Radha feels that her dyspnea on exertion has worsened slightly.  She also states she may have some abdominal bloating and mild lower extremity edema.  She also has fatigue..  She denies  lightheadedness, shortness of breath, orthopnea, PND, palpitations and chest pain.      Radha Arnold s weight at discharge was 145 pounds.  She is monitoring home weights which are stable between 149-150 pounds.  She is trying to follow a low sodium diet.      Medication reconciliation was done today.    Review of Systems:   General: Weight Gain  Eyes: WNL  Ears/Nose/Throat: WNL  Lungs: Shortness of Breath  Heart: WNL  Stomach: WNL  Bladder: WNL  Muscle/Joints: WNL  Skin: WNL  Nervous System: WNL  Mental Health: WNL     Blood: WNL    Patient Active Problem List   Diagnosis     Nephrolithiasis     Migraine Headache     Diverticulosis     Irritable Bowel Syndrome     Osteoarthritis Of The Knee     Palpitations     Colloid Nontoxic Multinodular Goiter     Adjustment Disorder     Obstructive Sleep Apnea     Female Stress Incontinence     Osteopenia     Hyperlipidemia     Hypothyroidism     Venous Insufficiency     Osteoarthritis     Edema     Lower Back Pain     Vitamin D deficiency     Lymphocytic colitis     Paroxysmal atrial fibrillation     Essential hypertension with goal blood pressure less than 140/90     Mitral valve stenosis, mild     Primary osteoarthritis of one knee, right     Heart failure with preserved ejection fraction       Past Medical History:   Diagnosis Date     Adjustment disorder      Arthritis      Atrial fibrillation      CHF (congestive heart failure) 7/15/2016     Gall stones      Hemorrhoids      History of anesthesia complications     slow to wake up      HLD (hyperlipidemia)      HTN (hypertension)      Hypothyroid      IBS (irritable bowel syndrome)      Migraine      Mitral valve stenosis      Nephrolithiasis      Osteopenia      Paroxysmal atrial fibrillation      Sleep apnea        Past Surgical History:   Procedure Laterality Date     CHOLECYSTECTOMY       NE REMOVAL GALLBLADDER      Description: Cholecystectomy;  Recorded: 06/09/2009;     NE TOTAL KNEE ARTHROPLASTY Right 3/14/2017     Procedure: RIGHT TOTAL KNEE ARTHROPLASTY;  Surgeon: Peña Chacon MD;  Location: [facility] Main OR;  Service: Orthopedics     TUBAL LIGATION         Family History   Problem Relation Age of Onset     Heart disease Mother      Hyperlipidemia Mother      Breast cancer Mother      Heart disease Father      Hyperlipidemia Father      Cancer Sister      Hyperlipidemia Sister      Breast cancer Paternal Aunt        Social History     Social History     Marital status:      Spouse name: N/A     Number of children: N/A     Years of education: N/A     Occupational History     Not on file.     Social History Main Topics     Smoking status: Never Smoker     Smokeless tobacco: Never Used      Comment: no second hand smoke exposure     Alcohol use 0.6 - 1.2 oz/week     1 - 2 Glasses of wine per week     Drug use: No     Sexual activity: Not on file     Other Topics Concern     Not on file     Social History Narrative       Current Outpatient Prescriptions   Medication Sig Dispense Refill     acetaminophen (TYLENOL) 325 MG tablet Take 650 mg by mouth every 6 (six) hours as needed for pain.       amoxicillin (AMOXIL) 500 MG capsule Take 500 mg by mouth once. Take 4 tablets 1 hour prior to dental appointment, due to knee replacement       azithromycin (ZITHROMAX) 500 MG tablet Take 1 tablet 1 hour before dental procedure1 1 tablet 5     cholecalciferol, vitamin D3, (VITAMIN D3) 2,000 unit Tab Take 2,000 Units by mouth daily.        citalopram (CELEXA) 20 MG tablet Take 1 tablet (20 mg total) by mouth daily. 90 tablet 3     furosemide (LASIX) 40 MG tablet Take 1 tablet (40 mg total) by mouth daily. 90 tablet 3     levothyroxine (SYNTHROID, LEVOTHROID) 75 MCG tablet Take 75 mcg by mouth daily.       pantoprazole (PROTONIX) 40 MG tablet Take 40 mg by mouth daily.       rivaroxaban 20 mg Tab Take 20 mg by mouth daily with supper.       simvastatin (ZOCOR) 20 MG tablet Take 20 mg by mouth daily.       sotalol (BETAPACE) 80 MG  tablet Take 1 tablet (80 mg total) by mouth 2 (two) times a day. 180 tablet 3     traMADol (ULTRAM) 50 mg tablet Take  mg by mouth 4 (four) times a day as needed for pain (for back pain).       raloxifene (EVISTA) 60 mg tablet Take 60 mg by mouth daily.       No current facility-administered medications for this visit.        Allergies   Allergen Reactions     Penicillins Hives       Objective:     Vitals:    11/02/17 1303   BP: 100/68   Pulse: 80   Resp: 16     Wt Readings from Last 3 Encounters:   11/02/17 148 lb (67.1 kg)   10/24/17 145 lb 1.6 oz (65.8 kg)   10/22/17 145 lb (65.8 kg)       General Appearance:   Alert, cooperative and in no acute distress.   HEENT:  No scleral icterus; the mucous membranes were pink and moist.   Neck: JVP normal. No HJR   Chest: The spine was straight. The chest was symmetric.   Lungs:   Respirations unlabored; the lungs are clear to auscultation.   Cardiovascular:   Regular rhythm. S1 and S2 without murmur, clicks or rubs. Radial and posterior tibial pulses are intact and symmetrical.    Abdomen:  Soft, nontender, nondistended, bowel sounds present   Extremities: No cyanosis. Trace bilateral lower extremity edema. Wearing compression stockings.   Skin: No xanthelasma.   Neurologic: Mood and affect are appropriate.         Lab Review   Lab Results   Component Value Date    CREATININE 0.79 10/22/2017    BUN 17 10/22/2017     10/22/2017    K 4.2 10/22/2017     10/22/2017    CO2 25 10/22/2017     Lab Results   Component Value Date     (H) 10/21/2017     BNP (pg/mL)   Date Value   10/21/2017 653 (H)   06/08/2017 416 (H)   02/21/2017 316 (H)     Creatinine (mg/dL)   Date Value   10/22/2017 0.79   10/21/2017 0.72   07/10/2017 0.80   06/08/2017 0.72       Cardiographics  Echocardiogram: 10/22/2017  Summary     When compared to the previous study dated 7/16/2016, no significant change    Left ventricle ejection fraction is normal. The estimated left ventricular  ejection fraction is 65%.    Moderate mitral annular calcification with mild to moderate mitral regurgitation    Minimal aortic stenosis with trace aortic insufficiency           40 minutes were face to face spent with the patient with greater than 50% spent on education and counseling.      Priscila Morse, [facility] [place]   Heart Failure Clinic

## 2021-06-13 NOTE — TELEPHONE ENCOUNTER
Medication Question or Clarification  Who is calling: Patient  What medication are you calling about (include dose and sig)?:   sotalol (BETAPACE) 80 MG tablet 180 tablet 3 10/21/2019     Sig - Route: Take 1 tablet (80 mg total) by mouth 2 (two) times a day. - Oral      Who prescribed the medication?: GRACIELA Tavares CNP  What is your question/concern?: Patient is now under the care of an [facility] provider due to distance of clinic from patient home.  The [facility] provider refused to refill sotalol due to the fact the patient is due to see cardiology for what appears a CT Lung.  Patient is asking if Dr Rodriguez will fill this order one more time? Please advise.    Requested Pharmacy: [facility]  Okay to leave a detailed message?: Yes

## 2021-06-13 NOTE — PROGRESS NOTES
ASSESSMENT:  1.  Hospital follow-up: Radha was hospitalized for an acute exacerbation of diastolic heart failure.  Echocardiogram did not show any major changes from her baseline she acknowledged that she had missed some furosemide doses prior to admission.  She did receive IV furosemide and was discharged on her usual oral dose.  She has felt well from a respiratory standpoint since discharge, and does not note peripheral edema.  The importance of compliance with diuretic use was discussed.  The importance of daily weights also was discussed.    2.  Osteoporosis: Her recent bone density study was reviewed.  She had been on raloxifene.  Despite this, bone density had declined with a lowest T score of -2.6.  She is not a good candidate for oral bisphosphonates due to a history of significant esophageal reflux.  Her insurance for Prolia has been investigated and is excellent.  This will be started today.    3.  Paroxysmal atrial fibrillation: She was in sinus rhythm.  She is on Xarelto as an anticoagulant with sotalol as an antiarrhythmic.    4.  Low back and joint pain: Tylenol and tramadol would be advised for symptomatic treatment.  NSAIDs need to be avoided due to Xarelto use and history of CHF.    All medications have been reconciled.     PLAN:  1.  Check weight daily.  Call for a rapid increase or decrease in weight of over 5 pounds.  2.  Continue furosemide 40 mg daily.  Blood pressure currently is excellent.  She seems euvolemic.  3.  Stop raloxifene.  Start Prolia 60 mg every 6 months with the first injection today.  4.  Clinic follow-up 3 months or as needed            ASSESSED PROBLEMS:  No diagnosis found.    CHIEF COMPLAINT:  Chief Complaint   Patient presents with     Hospital Visit Follow Up     for shortness of breathe       HISTORY OF PRESENT ILLNESS:  Radha is a 82 y.o. female presenting to the clinic today for follow up after in patient care at [facility] on 10/21/17 - 10/22/17 for acute chronic  "congestive heart failure. She reports that she became very short of breath on Friday the 20th after having lunch with a friend. When her shortness of breath persisted on Saturday, her daughter took her to the ER and she was admitted. An ECHO and EKG were unchanged from previous and a chest x-ray showed no pleural effusion or pneumothorax. Interstitial edema was present. She was treated with IV Lasix and discharged the following day with no change in medications.     Since coming home, she is feeling well. She admits that she had been missing doses of furosemide 40mg daily and states that she will try harder to remember the medication. Her ankles had been swollen for several days prior to her hospitalization. She is taking it easy at home. Her breathing is improved. She does weight herself daily and will make note of weight gain over five pounds. She has a follow up appointment with cardiology next week.      Osteoporosis: A DXA of 9/11/17 showed osteoporosis with a low T-score of -2.6 at the right total hip. She has been taking Evista. 60mg daily. She will have her first Prolia injection today.     Knee Pain: She had a total knee replacement on the right. The knee is still painful. She uses naproxen for this and back pain. She is also taking tramadol, which is helpful.     REVIEW OF SYSTEMS:   She reports ongoing back pain with sciatic pains.   All other systems are negative.    PFSH: .  Lives independently.  Activity has been limited by joint pains.      TOBACCO USE:  History   Smoking Status     Never Smoker   Smokeless Tobacco     Never Used     Comment: no second hand smoke exposure       VITALS:  Vitals:    10/24/17 1331   BP: 104/76   Patient Site: Left Arm   Patient Position: Sitting   Cuff Size: Adult Regular   Pulse: 72   Weight: 145 lb 1.6 oz (65.8 kg)   Height: 5' 4\" (1.626 m)     Wt Readings from Last 3 Encounters:   10/24/17 145 lb 1.6 oz (65.8 kg)   10/22/17 145 lb (65.8 kg)   09/26/17 146 lb 8 " oz (66.5 kg)     Body mass index is 24.91 kg/(m^2).    PHYSICAL EXAM:  Constitutional:   Reveals an alert, pleasant, talkative woman. Affect appropriate. Does not seem acutely ill. Vitals: per nursing notes.  HEENT: Atraumatic.   Neck:  No bruits or adenopathy.  Back:  No spine or CVA pain.  Thorax:  No bony deformities.  Lungs: Clear to A&P without rales or wheezes.  Respiratory effort normal.  Cardiac:   Regular rate and rhythm, normal S1, S2, no murmur.  Abdomen:  Soft, active bowel sounds without mass.  Extremities:   No significant edema. Joint changes consistent with erosive arthritis.     ADDITIONAL HISTORY SUMMARIZED (2): Reviewed hospital course of 10/21/17 - 10/22/17.  DECISION TO OBTAIN EXTRA INFORMATION (1): None.   RADIOLOGY TESTS (1): Reviewed chest x-ray performed during hospital encounter.   LABS (1): Reviewed labs performed during hospital encounter.   MEDICINE TESTS (1): None.  INDEPENDENT REVIEW (2 each): None.     The visit lasted a total of 20 minutes face to face with the patient. Over 50% of the time was spent counseling and educating the patient about her recent hospital stay.    Jermaine RODRIGUEZ, am scribing for and in the presence of, Dr. Rodriguez.    CABRERA RODRIGUEZ, personally performed the services described in this documentation, as scribed by Jermaine Rubi in my presence, and it is both accurate and complete.    Dragon dictation was used for this note.  Speech recognition errors are a possibility.    MEDICATIONS:  Current Outpatient Prescriptions   Medication Sig Dispense Refill     acetaminophen (TYLENOL) 325 MG tablet Take 650 mg by mouth every 6 (six) hours as needed for pain.       amoxicillin (AMOXIL) 500 MG capsule Take 500 mg by mouth once. Take 4 tablets 1 hour prior to dental appointment, due to knee replacement       cholecalciferol, vitamin D3, (VITAMIN D3) 2,000 unit Tab Take 2,000 Units by mouth daily.        citalopram (CELEXA) 20 MG tablet Take 1 tablet (20 mg total) by mouth  daily. 90 tablet 3     furosemide (LASIX) 40 MG tablet Take 1 tablet (40 mg total) by mouth daily. 90 tablet 3     levothyroxine (SYNTHROID, LEVOTHROID) 75 MCG tablet Take 75 mcg by mouth daily.       pantoprazole (PROTONIX) 40 MG tablet Take 40 mg by mouth daily.       raloxifene (EVISTA) 60 mg tablet Take 60 mg by mouth daily.       rivaroxaban 20 mg Tab Take 20 mg by mouth daily with supper.       simvastatin (ZOCOR) 20 MG tablet Take 20 mg by mouth daily.       sotalol (BETAPACE) 80 MG tablet Take 1 tablet (80 mg total) by mouth 2 (two) times a day. 180 tablet 3     traMADol (ULTRAM) 50 mg tablet Take  mg by mouth 4 (four) times a day as needed for pain (for back pain).       No current facility-administered medications for this visit.        Total data points: 4

## 2021-06-14 NOTE — PROGRESS NOTES
Patient seen in clinic for continued HF education.  Patient viewed 'What is heart Failure' video which covers risk factors, symptoms, medications, Na and fluid guidelines, balancing activity and rest, and daily monitoring of symptoms.    Addressed patients questions/concerns- will continue to reinforce HF education with future patient interactions. Pt will f/up as planned with Dr. Paulson on December 11th and with Priscila in 6 weeks.

## 2021-06-14 NOTE — TELEPHONE ENCOUNTER

## 2021-06-14 NOTE — TELEPHONE ENCOUNTER
Called Radha  to let her know the scar at the top of her lung is not changed and therefore not cancer. Per Dr. Altamirano, The scarring  in the rest of her lungs has progressed slightly since her 2018 scan. Said she has been feeling good, denies symptoms and will call if questions or concerns.

## 2021-06-14 NOTE — PROGRESS NOTES
IASSESSMENT:  1.  Right knee pain: Symptoms began acutely without trauma.  There is no sign of inflammation on exam.  Had a previous right total knee replacement by Dr. Chacon.  Previous x-rays showed the prosthesis to be well seated.  I suspect her discomfort will be transient.  If it persists, follow-up with Dr. Chacon would be advised.    2.  Right lower back pain: An x-ray was done since symptoms have been persistent.  This showed scoliosis with convex right curvature retrolisthesis of L2 and anterolisthesis of L4 and L5 were noted.  Advanced degenerative disc narrowing at L4-5 and L5-S1 was noted.  A physical therapy approach would be advised.    3.  Pain management: She had been using ibuprofen.  She was advised that this would be contraindicated by use of Xarelto, especially since she has had a past history of significant esophageal reflux.  Tramadol or Tylenol were advised as alternates.    4.  Congestive heart failure due to diastolic dysfunction: She seems well compensated, but still tires easily with activity.  Furosemide was increased to 80 mg daily at a recent cardiology visit.    5.  Osteoporosis: On Prolia    6.  Paroxysmal atrial fibrillation: Clinically in sinus rhythm.  On sotalol.  Xarelto is used as an anticoagulant    7.  Situational depression: On Celexa.  Doing well    8.  History of hypertension: Good control on current regimen    9.  Cervicalgia: Discomfort is nonradicular.  Perhaps a physical therapy approach would be of benefit    PLAN:  1.  LS spine x-ray was done and reviewed  2.  Refer to physical therapy for management of low back pain.  Call if no improvement  3.  Avoid ibuprofen and other NSAIDs.  He is Tylenol or tramadol for pain  4.  See Dr. Chacon if right knee pain persists  5.  Clinic follow-up in 3 months or as needed    Orders Placed This Encounter   Procedures     XR Lumbar Spine 2 or 3 VWS     Standing Status:   Future     Number of Occurrences:   1     Standing Expiration  Date:   11/30/2018     Order Specific Question:   Can the procedure be changed per Radiologist protocol?     Answer:   Yes     Ambulatory referral to Adult PT- External     Referral Priority:   Routine     Referral Type:   Physical Therapy     Referral Reason:   Evaluation and Treatment     Requested Specialty:   Physical Therapy     Number of Visits Requested:   1     Medications Discontinued During This Encounter   Medication Reason     raloxifene (EVISTA) 60 mg tablet Alternate therapy     amoxicillin (AMOXIL) 500 MG capsule Alternate therapy           ASSESSED PROBLEMS:  1. Low back pain  XR Lumbar Spine 2 or 3 VWS    Ambulatory referral to Adult PT- External   2. Cervicalgia  Ambulatory referral to Adult PT- External       CHIEF COMPLAINT:  Chief Complaint   Patient presents with     Knee Pain     itnense knee pain right knee     Back Pain     siatica     Insomnia     not sleeping well at night     Neck Injury     Congestive Heart Failure     always seems to be out of breathe       HISTORY OF PRESENT ILLNESS:  Radha is a 82 y.o. female presenting to the clinic today with complaints of knee pain, buttock pain, and lower extremity edema.     Knee Pain: She did something to injure her right knee earlier today. She did not twist it or step on anything wrong as far as she knows. She felt fine when she left her apartment this morning, but she could hardly walk by the time she got here today. She has taken tramadol for pain in the past. She has been taking Advil and Aleve.     Buttock Pain: She states that she has sciatic nerve pain. She has been in quite a bit of pain, and she does not know why it has been worse. The pain runs down the right buttocks and stops a little below the hip. This has been more bothersome in the past month or so. She thinks sitting might make it worse. She inquires if physical therapy could help this problem.     Peripheral Edema: She occasionally has some swelling in her lower legs by the  "end of the day. She is taking 80 mg of furosemide daily. She wears compression stockings during the day. She was seen by the cardiologist earlier today and had some labs drawn but the results are not in yet.     REVIEW OF SYSTEMS:   Her mood has been good. She has had more trouble sleeping lately. She thinks it is due to sharing a bed when she is used to sleeping alone. Her buttock pain also affects her sleep. All other systems are negative.    PFSH:  Reviewed, as below.  .  Lives independently    TOBACCO USE:  History   Smoking Status     Never Smoker   Smokeless Tobacco     Never Used     Comment: no second hand smoke exposure       VITALS:  Vitals:    11/30/17 1340   BP: 114/76   Patient Site: Left Arm   Patient Position: Sitting   Cuff Size: Adult Regular   Pulse: 80   Weight: 153 lb (69.4 kg)   Height: 5' 2\" (1.575 m)     Wt Readings from Last 3 Encounters:   11/30/17 153 lb (69.4 kg)   11/30/17 151 lb (68.5 kg)   11/02/17 148 lb (67.1 kg)     Body mass index is 27.98 kg/(m^2).    PHYSICAL EXAM:  Constitutional:   Reveals an alert, talkative woman. Affect appropriate. Does not appear acutely ill.  Vitals: per nursing notes.  Back:  No tenderness over spine or SI joints, no obvious scoliosis or kyphosis.   Lungs: Clear to A&P without rales or wheezes.  Respiratory effort normal.  Cardiac:   Regular rate and rhythm, normal S1, S2, II/VI systolic ejection murmur at left lower sternal border.   Extremities: Healed scar right knee anteriorly, no joint effusion or erythema. No significant peripheral edema. Straight leg raise negative bilaterally. Internal and external rotation of the hips full. Fairly well preserved range of motion of right knee. No ligamentous instability or effusion. Moderate crepitance of left knee to range of motion, no effusion or erythema.    Neuro:  Alert and oriented. Cranial nerves, motor, sensory exams are intact.  No gross focal deficits.  Psychiatric:  Memory intact, mood " appropriate.    X-ray: Moderate lumbar scoliosis, severe degenerative disc disease at L5 and S1 with moderate spurring at other lumbar levels.     ADDITIONAL HISTORY SUMMARIZED (2): Reviewed cardiology note from earlier today regarding heart failure.   DECISION TO OBTAIN EXTRA INFORMATION (1): None.   RADIOLOGY TESTS (1): X-ray ordered.   LABS (1): Labs from 10/22 reviewed.   MEDICINE TESTS (1): None.  INDEPENDENT REVIEW (2 each): X-ray from today personally read.     The visit lasted a total of 22 minutes face to face with the patient. Over 50% of the time was spent counseling and educating the patient about her knee pain, back pain, and edema.    IRene, am scribing for and in the presence of, Dr. Rodriguez.    CABRERA RODRIGUEZ, personally performed the services described in this documentation, as scribed by Rene Patel in my presence, and it is both accurate and complete.    Dragon dictation was used for this note.  Speech recognition errors are a possibility.    MEDICATIONS:  Current Outpatient Prescriptions   Medication Sig Dispense Refill     acetaminophen (TYLENOL) 325 MG tablet Take 650 mg by mouth every 6 (six) hours as needed for pain.       azithromycin (ZITHROMAX) 500 MG tablet Take 1 tablet 1 hour before dental procedure1 1 tablet 5     cholecalciferol, vitamin D3, (VITAMIN D3) 2,000 unit Tab Take 2,000 Units by mouth daily.        citalopram (CELEXA) 20 MG tablet Take 1 tablet (20 mg total) by mouth daily. 90 tablet 3     furosemide (LASIX) 40 MG tablet Take 2 tablets (80 mg total) by mouth daily. 180 tablet 3     levothyroxine (SYNTHROID, LEVOTHROID) 75 MCG tablet Take 75 mcg by mouth daily.       pantoprazole (PROTONIX) 40 MG tablet Take 20 mg by mouth daily.        rivaroxaban 20 mg Tab Take 20 mg by mouth daily with supper.       simvastatin (ZOCOR) 20 MG tablet Take 20 mg by mouth daily.       sotalol (BETAPACE) 80 MG tablet Take 1 tablet (80 mg total) by mouth 2 (two) times a day. 180  tablet 3     traMADol (ULTRAM) 50 mg tablet Take  mg by mouth 4 (four) times a day as needed for pain (for back pain).       No current facility-administered medications for this visit.        Total data points: 6

## 2021-06-14 NOTE — PROGRESS NOTES
Assessment/Plan:     1. Heart failure with preserved ejection fraction, NYHA class III: Radha Arnold appears not compensated.  She continues to have dyspnea on exertion which is slightly worse.  Her weights have remained stable.  I will check a BMP and BNP.  Based on lab results may adjust Lasix further.  We discussed following a low-sodium diet which she continues to eat [employer] salads which are high in sodium.  I stressed the importance of this.  She watched the heart failure video today.  She will continue taking Lasix 80 mg daily.     2. Hypertension: Controlled.  Blood pressure 112/68.    3. Paroxysmal atrial fibrillation: Normal rhythm today.  She continues Xarelto 20 mg daily and sotalol 80 mg twice a day.    Follow-up with Dr. Paulson December 11 as scheduled and in the heart failure clinic in 6 weeks or sooner if needed    Subjective:     Radha Arnold is seen at [facility] heart failure clinic today for continued follow-up.  She follows up for heart failure with preserved ejection fraction.  Her most recent echocardiogram was done October 22, 2017 which showed an ejection fraction of 65% along with mild to moderate mitral regurgitation, mild mitral stenosis, mild tricuspid regurgitation, and mild aortic stenosis.  She has a past medical history significant for hypertension, hyperlipidemia, paroxysmal atrial fibrillation, and obstructive sleep apnea.    Today, she comes in for follow-up and states her dyspnea on exertion is slightly worse since our last visit.  I increased her Lasix to 80 mg daily a week ago and she states this has not improved her shortness of breath.  She states she does not know if she is more aware of this now since being hospitalized.  She has trace lower extremity edema.  She denies any other acute heart failure symptoms.  She denies orthopnea or PND.  She denies fatigue, lightheadedness, shortness of breath, orthopnea, PND, palpitations, chest pain and abdominal  fullness/bloating.      She is monitoring home weights which are stable between 150-152 pounds.  She trying to follow a low sodium diet.  She continues to eat [employer] salads which we have discussed are high in sodium.     Review of Systems:   General: WNL  Eyes: WNL  Ears/Nose/Throat: WNL  Lungs: WNL  Heart: Shortness of Breath with activity, Irregular Heartbeat  Stomach: WNL  Bladder: WNL  Muscle/Joints: WNL  Skin: WNL  Nervous System: WNL  Mental Health: WNL     Blood: WNL     Patient Active Problem List   Diagnosis     Nephrolithiasis     Migraine Headache     Diverticulosis     Irritable Bowel Syndrome     Osteoarthritis Of The Knee     Palpitations     Colloid Nontoxic Multinodular Goiter     Adjustment Disorder     Obstructive Sleep Apnea     Female Stress Incontinence     Osteopenia     Hyperlipidemia     Hypothyroidism     Venous Insufficiency     Osteoarthritis     Edema     Lower Back Pain     Vitamin D deficiency     Lymphocytic colitis     Paroxysmal atrial fibrillation     Essential hypertension with goal blood pressure less than 140/90     Mitral valve stenosis, mild     Primary osteoarthritis of one knee, right     Heart failure with preserved ejection fraction       Past Medical History:   Diagnosis Date     Adjustment disorder      Arthritis      Atrial fibrillation      CHF (congestive heart failure) 7/15/2016     Gall stones      Hemorrhoids      History of anesthesia complications     slow to wake up      HLD (hyperlipidemia)      HTN (hypertension)      Hypothyroid      IBS (irritable bowel syndrome)      Migraine      Mitral valve stenosis      Nephrolithiasis      Osteopenia      Paroxysmal atrial fibrillation      Sleep apnea        Past Surgical History:   Procedure Laterality Date     CHOLECYSTECTOMY       IL REMOVAL GALLBLADDER      Description: Cholecystectomy;  Recorded: 06/09/2009;     IL TOTAL KNEE ARTHROPLASTY Right 3/14/2017    Procedure: RIGHT TOTAL KNEE ARTHROPLASTY;  Surgeon:  Peña Chacon MD;  Location: [facility] Main OR;  Service: Orthopedics     TUBAL LIGATION         Family History   Problem Relation Age of Onset     Heart disease Mother      Hyperlipidemia Mother      Breast cancer Mother      Heart disease Father      Hyperlipidemia Father      Cancer Sister      Hyperlipidemia Sister      Breast cancer Paternal Aunt        Social History     Social History     Marital status:      Spouse name: N/A     Number of children: N/A     Years of education: N/A     Occupational History     Not on file.     Social History Main Topics     Smoking status: Never Smoker     Smokeless tobacco: Never Used      Comment: no second hand smoke exposure     Alcohol use 0.6 - 1.2 oz/week     1 - 2 Glasses of wine per week     Drug use: No     Sexual activity: Not on file     Other Topics Concern     Not on file     Social History Narrative       Current Outpatient Prescriptions   Medication Sig Dispense Refill     acetaminophen (TYLENOL) 325 MG tablet Take 650 mg by mouth every 6 (six) hours as needed for pain.       amoxicillin (AMOXIL) 500 MG capsule Take 500 mg by mouth once. Take 4 tablets 1 hour prior to dental appointment, due to knee replacement       azithromycin (ZITHROMAX) 500 MG tablet Take 1 tablet 1 hour before dental procedure1 1 tablet 5     cholecalciferol, vitamin D3, (VITAMIN D3) 2,000 unit Tab Take 2,000 Units by mouth daily.        citalopram (CELEXA) 20 MG tablet Take 1 tablet (20 mg total) by mouth daily. 90 tablet 3     furosemide (LASIX) 40 MG tablet Take 2 tablets (80 mg total) by mouth daily. 180 tablet 3     levothyroxine (SYNTHROID, LEVOTHROID) 75 MCG tablet Take 75 mcg by mouth daily.       pantoprazole (PROTONIX) 40 MG tablet Take 20 mg by mouth daily.        rivaroxaban 20 mg Tab Take 20 mg by mouth daily with supper.       simvastatin (ZOCOR) 20 MG tablet Take 20 mg by mouth daily.       sotalol (BETAPACE) 80 MG tablet Take 1 tablet (80 mg total) by mouth 2  (two) times a day. 180 tablet 3     traMADol (ULTRAM) 50 mg tablet Take  mg by mouth 4 (four) times a day as needed for pain (for back pain).       raloxifene (EVISTA) 60 mg tablet Take 60 mg by mouth daily.       No current facility-administered medications for this visit.        Allergies   Allergen Reactions     Penicillins Hives       Objective:     Vitals:    11/30/17 1040   BP: 112/68   Pulse: 64   Resp: 18     Wt Readings from Last 3 Encounters:   11/30/17 151 lb (68.5 kg)   11/02/17 148 lb (67.1 kg)   10/24/17 145 lb 1.6 oz (65.8 kg)       General Appearance:   Alert, cooperative and in no acute distress.   HEENT:  No scleral icterus; the mucous membranes were pink and moist.   Neck: JVP normal. No HJR.   Chest: The spine was straight. The chest was symmetric.   Lungs:   Respirations unlabored; the lungs are clear to auscultation.   Cardiovascular:   Regular rhythm. S1 and S2 without murmur, clicks or rubs. Radial and posterior tibial pulses are intact and symmetrical.    Abdomen:  Soft, nontender, nondistended, bowel sounds present   Extremities: No cyanosis, clubbing. Trace bilateral lower extremity edema. Wearing compression stockings.   Skin: No xanthelasma.   Neurologic: Mood and affect are appropriate.         Lab Review   BMP and BNP pending        Cardiographics  Echocardiogram: 10/22/2017  Summary     When compared to the previous study dated 7/16/2016, no significant change    Left ventricle ejection fraction is normal. The estimated left ventricular ejection fraction is 65%.    Moderate mitral annular calcification with mild to moderate mitral regurgitation    Minimal aortic stenosis with trace aortic insufficiency           25 minutes were spent face to face with the patient with greater than 50% spent on education and counseling.      Priscila Morse, [facility]   Heart Failure Clinic

## 2021-06-15 PROBLEM — M17.11 PRIMARY OSTEOARTHRITIS OF ONE KNEE, RIGHT: Status: ACTIVE | Noted: 2017-03-14

## 2021-06-15 NOTE — PROGRESS NOTES
ASSESSMENT:  1.  Emergency room follow-up: Radha was seen yesterday complaining of increased shortness of breath over several days.  She has a history of heart failure with preserved ejection fraction.  Chest x-ray did not show significant fluid overload.  BNP was elevated, but not much higher than in the past.  She does have moderate anemia which could be a contributing factor.  I would favor a trial of a higher furosemide dose to see if this would be of benefit.    2.  Paroxysmal atrial fibrillation: Clinically she is in sinus rhythm.  She is on Xarelto as an anticoagulant.  This is quite costly.  She was advised to check the cost of Eliquis with her pharmacist.  She could switch back to warfarin if desired    3.  Moderate anemia: Laboratory evaluation for the cause of anemia will be ordered.    PLAN:  1.  Labs as below for evaluation of anemia.  She will be notified of test results.  2.  Increase furosemide to 80 mg in the a.m., 40 mg in the afternoon  3.  Start potassium 20 mEq daily.  (Her potassium was surprisingly in the normal range yesterday despite not using a supplement and taking 80 mg of furosemide daily).  4.  Clinic follow-up in 3-4 weeks    Orders Placed This Encounter   Procedures     Ferritin     Iron and Transferrin Iron Binding Capacity     Erythrocyte Sedimentation Rate     Vitamin B12     Electrophoresis, Protein, Serum     Reticulocytes     Medications Discontinued During This Encounter   Medication Reason     traMADol (ULTRAM) 50 mg tablet Duplicate order     furosemide (LASIX) 40 MG tablet Reorder     potassium chloride SA (K-DUR,KLOR-CON) 20 MEQ tablet Reorder       Return in about 4 weeks (around 2/12/2018) for f/u CHF, increased furosemide and started potassium.    ASSESSED PROBLEMS:  1. Anemia  Ferritin    Iron and Transferrin Iron Binding Capacity    Erythrocyte Sedimentation Rate    Vitamin B12    Electrophoresis, Protein, Serum    Reticulocytes   2. Heart failure with preserved  ejection fraction  furosemide (LASIX) 40 MG tablet   3. Diuretic-induced hypokalemia  potassium chloride SA (K-DUR,KLOR-CON) 20 MEQ tablet       CHIEF COMPLAINT:  Chief Complaint   Patient presents with     Follow-up     Went ER 1/14/17 for SOB       HISTORY OF PRESENT ILLNESS:  Radha is a 82 y.o. female presenting to the clinic today to follow up on a visit to the ER on 1/14/18 for shortness of breath with a history of CHF with preserved ejection fraction. She mentions that she was short of breath all of Saturday, 1/13/18, and did not sleep well that night because of it. She is not able to say what made her SOB worse. She did not feel sick at all and does not recall if she had any ankle swelling. She had an ache in her chest at some point yesterday but it is gone today. She did not have any pounding or fluttering of her heart. None of her medications were changed while she was in the ER. She is currently taking 80 mg of furosemide once daily. She believes that she was placed on an extra tablet for a few days due to some ankle swelling. She will become more easily winded when walking around her apartment or when she is walking from the parking lot into a restaurant. This is changed from previously. She is not currently taking potassium daily.     Osteopenia: She is not taking Evista and does not recall why she is no longer taking the medication. Her last DEXA was completed on 9/11/17 and it showed a low t-score of -2.6 in her right total hip.        REVIEW OF SYSTEMS:   She feels like she is frequently dragging in energy. She has a handicap parking sticker that she has been using. She went to  her prescription of Xarelto today and she was going to be charged $400.   All other systems are negative.    PFSH:  Social: She occasionally gets lunch with her friends on [address]   Family: Both her mother and father had heart disease.     TOBACCO USE:  History   Smoking Status     Never Smoker   Smokeless Tobacco      Never Used     Comment: no second hand smoke exposure       VITALS:  Vitals:    01/15/18 1558   BP: 108/64   Patient Site: Left Arm   Patient Position: Sitting   Cuff Size: Adult Regular   Pulse: 82   SpO2: 97%   Weight: 150 lb (68 kg)     Wt Readings from Last 3 Encounters:   01/15/18 150 lb (68 kg)   01/14/18 150 lb (68 kg)   11/30/17 153 lb (69.4 kg)     Body mass index is 25.75 kg/(m^2).    PHYSICAL EXAM:  Constitutional:   Reveals an alert, pleasant, talkative female. Affect appropriate, does not appear acutely ill.  Vitals: per nursing notes.  Neck:  Supple, no carotid bruits or adenopathy or thyromegaly.  Back:  No spine or CVA pain.  Thorax:  No bony deformities.  Lungs: Respiratory effort normal. Scant lower base crackles, otherwise clear.   Cardiac:   Regular rate and rhythm, normal S1. I-II/VI Systolic murmur to left sternal border.  Breasts:   No masses, no axillary adenopathy.  Abdomen:  Soft, active bowel sounds without bruits, mass, or tenderness.  Extremities:  No significant edema.   Skin:  No peripheral cyanosis or abnormal pallor.  Neuro:  Alert and oriented. Cranial nerves intact. No gross focal deficits.  Psychiatric:  Memory intact, mood appropriate.      ADDITIONAL HISTORY SUMMARIZED (2): Reviewed ER note from 1/14/18; chest pain, SOB, no med changes made.   DECISION TO OBTAIN EXTRA INFORMATION (1): None.   RADIOLOGY TESTS (1): Reviewed DEXA was completed on 9/11/17 and it showed a low t-score of -2.6 in her right total hip.    LABS (1): Reviewed labs from 1/14/18; BNP, HM2, CMP.   MEDICINE TESTS (1): None.  INDEPENDENT REVIEW (2 each): None.     The visit lasted a total of 20 minutes face to face with the patient. Over 50% of the time was spent counseling and educating the patient about SOB.    Priscila RODRIGUEZ, am scribing for and in the presence of, Dr. Rodriguez.    Brando RODRIGUEZ, personally performed the services described in this documentation, as scribed by Priscila Jain in  my presence, and it is both accurate and complete.    Dragon dictation was used for this note.  Speech recognition errors are a possibility.    MEDICATIONS:  All medications have been reviewed and reconciled.     Current Outpatient Prescriptions   Medication Sig Dispense Refill     acetaminophen (TYLENOL) 325 MG tablet Take 650 mg by mouth every 6 (six) hours as needed for pain.       azithromycin (ZITHROMAX) 500 MG tablet Take 1 tablet 1 hour before dental procedure1 1 tablet 5     cholecalciferol, vitamin D3, (VITAMIN D3) 2,000 unit Tab Take 2,000 Units by mouth daily.        citalopram (CELEXA) 20 MG tablet Take 1 tablet (20 mg total) by mouth daily. 90 tablet 3     furosemide (LASIX) 40 MG tablet Two tabs in AM. One in afternoon 270 tablet 3     levothyroxine (SYNTHROID, LEVOTHROID) 75 MCG tablet Take 75 mcg by mouth daily.       pantoprazole (PROTONIX) 40 MG tablet Take 20 mg by mouth daily.        rivaroxaban 20 mg Tab Take 20 mg by mouth daily with supper.       simvastatin (ZOCOR) 20 MG tablet Take 20 mg by mouth daily.       sotalol (BETAPACE) 80 MG tablet TAKE 1 TABLET(80 MG) BY MOUTH TWICE DAILY 180 tablet 1     traMADol (ULTRAM) 50 mg tablet TAKE 1 TABLET(50 MG) BY MOUTH EVERY 6 HOURS AS NEEDED FOR PAIN 40 tablet 1     potassium chloride SA (K-DUR,KLOR-CON) 20 MEQ tablet Take 1 tablet (20 mEq total) by mouth daily. 90 tablet 3     No current facility-administered medications for this visit.        Total data points: 4

## 2021-06-15 NOTE — PROGRESS NOTES
CARDIOLOGY CLINIC CONSULT NOTE     Assessment/Plan:   1. Exertional dyspnea and 82-year-old woman with normal left ventricular systolic function.  Her blood pressure is well controlled today.  There is no evidence of occult atrial fibrillation, though this could cause her symptom complex.  There is no significant pulmonary hypertension however demonstrated on her echocardiogram.  We will check a 24-hour Holter monitor to see if she is having any atrial fibrillation that could be contributing to her bouts of dyspnea.  Another possibility that could contribute to nocturnal dyspnea is aspiration.  It seems unlikely that her symptoms are due to coronary artery disease  2. Possible obstructive sleep apnea.  I have suggested that she discuss with Dr. Lyon whether to follow through with formal polysomnogram testing    Follow up with Dr. Paulson is scheduled in 2 weeks time     History of Present Illness:     It is my pleasure to see Radha Arnold at the [facility] clinic for evaluation of dyspnea.    Radha Arnold is a 82 y.o. female with a past medical history of heart failure with preserved ejection fraction,  and paroxysmal atrial fibrillation treated with sotalol and Xarelto.  Nuclear stress test in 2016 showed a normal ejection fraction with no evidence of ischemia or infarction.  She has been followed by Dr. Paulson and most recently saw Bhavna Almazan in November.  According to the chart she also has a diagnosis of obstructive sleep apnea though patient denies this diagnosis and reports that she has never had a sleep study performed.    She has had intermittent bouts of shortness of breath both at with activities and at rest.  At times these keep her from being able to fall asleep at night, she denies paroxysmal nocturnal dyspnea or orthopnea, however.  She has not had any chest pains and has not had any awareness of palpitations with these bouts of shortness of breath.  They frequently  bother her most at night, and over the weekend she presented to the emergency room with 12 hours of worsening dyspnea.  A BNP was noted to be elevated, though her recent echocardiogram showed normal left ventricular size and systolic function.  No documentation of atrial fibrillation was present.  Last echocardiogram also showed mild mitral stenosis.  She does try to limit her sodium intake, although she had soup for dinner last evening and reports sleeping well last night    She reports only occasional daytime sleepiness.  She sleeps with the head of the bed elevated slightly on one phone book.  She has become quite breathless with walking, this has limited her exercise.  Interestingly she does attend [employer] and denies any shortness of breath with the activities there.    Past Medical History:     Patient Active Problem List   Diagnosis     Nephrolithiasis     Migraine Headache     Diverticulosis     Irritable Bowel Syndrome     Osteoarthritis Of The Knee     Palpitations     Colloid Nontoxic Multinodular Goiter     Adjustment Disorder     Obstructive Sleep Apnea     Female Stress Incontinence     Osteopenia     Hyperlipidemia     Hypothyroidism     Venous Insufficiency     Osteoarthritis     Edema     Lower Back Pain     Vitamin D deficiency     Lymphocytic colitis     Paroxysmal atrial fibrillation     Essential hypertension with goal blood pressure less than 140/90     Mitral valve stenosis, mild     Primary osteoarthritis of one knee, right     Heart failure with preserved ejection fraction       Past Surgical History:     Past Surgical History:   Procedure Laterality Date     CHOLECYSTECTOMY       NH TOTAL KNEE ARTHROPLASTY Right 3/14/2017    Procedure: RIGHT TOTAL KNEE ARTHROPLASTY;  Surgeon: Peña Chacon MD;  Location: [facility];  Service: Orthopedics     TUBAL LIGATION         Family History:     Family History   Problem Relation Age of Onset     Heart disease Mother      Hyperlipidemia Mother       Breast cancer Mother      Heart disease Father      Hyperlipidemia Father      Cancer Sister      Hyperlipidemia Sister      Breast cancer Paternal Aunt        Social History:    reports that she has never smoked. She has never used smokeless tobacco. She reports that she drinks about 0.6 - 1.2 oz of alcohol per week  She reports that she does not use illicit drugs.    Exercise: None due to exertional dyspnea    Sleep: Poorly restorative.  Denies diagnosis of sleep apnea. occasional daytime sleepiness    Meds:     Current Outpatient Prescriptions on File Prior to Visit   Medication Sig Dispense Refill     cholecalciferol, vitamin D3, (VITAMIN D3) 2,000 unit Tab Take 2,000 Units by mouth daily.        citalopram (CELEXA) 20 MG tablet Take 1 tablet (20 mg total) by mouth daily. 90 tablet 3     furosemide (LASIX) 40 MG tablet Two tabs in AM. One in afternoon 270 tablet 3     levothyroxine (SYNTHROID, LEVOTHROID) 75 MCG tablet Take 75 mcg by mouth daily.       pantoprazole (PROTONIX) 40 MG tablet Take 20 mg by mouth daily.        potassium chloride SA (K-DUR,KLOR-CON) 20 MEQ tablet Take 1 tablet (20 mEq total) by mouth daily. 90 tablet 3     rivaroxaban 20 mg Tab Take 20 mg by mouth daily with supper.       simvastatin (ZOCOR) 20 MG tablet Take 20 mg by mouth daily.       sotalol (BETAPACE) 80 MG tablet TAKE 1 TABLET(80 MG) BY MOUTH TWICE DAILY 180 tablet 1     traMADol (ULTRAM) 50 mg tablet TAKE 1 TABLET(50 MG) BY MOUTH EVERY 6 HOURS AS NEEDED FOR PAIN 40 tablet 1     acetaminophen (TYLENOL) 325 MG tablet Take 650 mg by mouth every 6 (six) hours as needed for pain.       azithromycin (ZITHROMAX) 500 MG tablet Take 1 tablet 1 hour before dental procedure1 1 tablet 5     No current facility-administered medications on file prior to visit.        Allergies:   Penicillins    Review of Systems:     General: WNL  Eyes: WNL  Ears/Nose/Throat: WNL  Lungs: WNL  Heart: WNL  Stomach: WNL  Bladder: WNL  Muscle/Joints:  "WNL  Skin: WNL  Nervous System: WNL  Mental Health: WNL     Blood: WNL        Objective:      Physical Exam  148 lb (67.1 kg)  5' 4\" (1.626 m)  Body mass index is 25.4 kg/(m^2).  /66 (Patient Site: Left Arm, Patient Position: Sitting, Cuff Size: Adult Regular)  Pulse 80  Resp 16  Ht 5' 4\" (1.626 m)  Wt 148 lb (67.1 kg)  BMI 25.4 kg/m2    General Appearance : Awake, Alert, No acute distress voice is somewhat hoarse  HEENT: No Scleral icterus; the mucous membranes were pink and moist.  Conjunctivae not injected  Neck:  No cervical bruits, jugular venous distention, or thyromegaly   Chest: The spine was straight  Lungs: Respirations unlabored; the lungs are clear to auscultation.  No wheezing    Cardiovascular:   Normal point of maximal impulse.  Auscultation reveals normal first and second heart sounds with no murmurs, rubs, or gallops.  Carotid, radial, and dorsalis pedal pulses and intact.  Abdomen: No organomegaly, masses, bruits, or tenderness. Bowels sounds are present  Extremities: Trace edema with intact and symmetric distal pulses  Skin: No xanthelasma. Warm, Dry.  Musculoskeletal: No tenderness.  Neurologic:  No tenderness.      EKG:  Normal sinus rhythm.  First-degree AV block.  Prolonged QT    XR CHEST 2 VIEWS  1/14/2018 10:33 AM  INDICATION: dyspnea  COMPARISON: None.  FINDINGS: Mild scarring at the lateral left lung base. No pleural fluid or pneumothorax. No airspace opacities. No significant edema. Normal size heart.    Cardiac Imaging Studies:  Echocardiogram 10/2017:      When compared to the previous study dated 7/16/2016, no significant change    Left ventricle ejection fraction is normal. The estimated left ventricular ejection fraction is 65%.    Moderate mitral annular calcification with mild to moderate mitral regurgitation    Minimal aortic stenosis with trace aortic insufficiency         Lab Review   Lab Results   Component Value Date     01/14/2018    K 3.9 01/14/2018    CL " 100 01/14/2018    CO2 26 01/14/2018    BUN 19 01/14/2018    CREATININE 0.79 01/14/2018    CALCIUM 9.3 01/14/2018     Lab Results   Component Value Date    WBC 5.4 01/14/2018    WBC 5.9 04/10/2015    HGB 10.1 (L) 01/14/2018    HCT 32.8 (L) 01/14/2018    MCV 81 01/14/2018     01/14/2018     Lab Results   Component Value Date    CHOL 178 07/10/2017    TRIG 130 07/10/2017    HDL 52 07/10/2017    LDLCALC 100 07/10/2017     Lab Results   Component Value Date    TROPONINI <0.01 01/14/2018     Lab Results   Component Value Date     (H) 01/14/2018     Lab Results   Component Value Date    TSH 1.99 07/10/2017       Gary Johnson MD [facility]    His note created using Dragon voice recognition software. Sound alike errors may have escaped editing.

## 2021-06-16 PROBLEM — I50.30 HEART FAILURE WITH PRESERVED EJECTION FRACTION (H): Status: ACTIVE | Noted: 2017-11-02

## 2021-06-16 PROBLEM — K21.9 GASTROESOPHAGEAL REFLUX DISEASE WITHOUT ESOPHAGITIS: Status: ACTIVE | Noted: 2019-12-31

## 2021-06-16 PROBLEM — R17 ELEVATED BILIRUBIN: Status: ACTIVE | Noted: 2019-12-28

## 2021-06-16 PROBLEM — M81.0 AGE-RELATED OSTEOPOROSIS WITHOUT CURRENT PATHOLOGICAL FRACTURE: Status: ACTIVE | Noted: 2019-11-05

## 2021-06-16 PROBLEM — R91.1 LUNG NODULE: Status: ACTIVE | Noted: 2018-06-29

## 2021-06-16 PROBLEM — R07.9 CHEST PAIN: Status: ACTIVE | Noted: 2019-12-28

## 2021-06-16 PROBLEM — R06.09 DYSPNEA ON EXERTION: Status: ACTIVE | Noted: 2018-03-09

## 2021-06-16 NOTE — TELEPHONE ENCOUNTER
Telephone Encounter by Leila Lipscomb RN at 1/9/2019  4:21 PM     Author: Leila Lipscomb RN Service: -- Author Type: Registered Nurse    Filed: 1/9/2019  4:22 PM Encounter Date: 1/9/2019 Status: Signed    : Leila Lipscomb RN (Registered Nurse)       Noted.  =======================  Lory Paulson MD   You 8 minutes ago (4:12 PM)      No new recommendations.  Aorta would appear to be stable and can follow-up in May as planned.

## 2021-06-16 NOTE — PROGRESS NOTES
Assessment/Plan:     1. Heart failure with preserved ejection fraction, NYHA class III: Radha Arnold appears well compensated.  She continues to have dyspnea on exertion and fatigue which have been stable.  She continues to monitor weights which are stable.  She follows a low-sodium diet.  Dr. Paulson recommended possible pulmonology referral for her dyspnea on exertion for further evaluation.  I have placed this referral today.  She will continue Lasix 80 mg in the morning and 40 mg in the afternoon.      Follow-up in the heart failure clinic in 3 months and with Dr. Paulson in August Subjective:     Radha Arnold is seen at [facility] heart failure clinic today for continued follow-up.  She follows up for heart failure with preserved ejection fraction.  Her most recent echocardiogram was done in October 2017 which showed an ejection fraction of 65%.  She had a nuclear stress test very 23rd 2018 which was negative for inducible myocardial ischemia or infarction.  Dr. Paulson recommended possible referral to pulmonology for further evaluation of her dyspnea on exertion.  He has a past medical history significant for hypertension, hyperlipidemia, paroxysmal atrial fibrillation, and obstructive sleep apnea.    Today, she comes in with continued complaints of dyspnea on exertion and fatigue.  She denies any other acute heart failure symptoms.  She denies any orthopnea or PND.  She states her dyspnea on exertion has been stable.  She denies lightheadedness, shortness of breath, orthopnea, PND, palpitations, chest pain, abdominal fullness/bloating and lower extremity edema.      She is monitoring home weights which are stable between 149-150 pounds.  She is following a low sodium diet.  She participates in regular physical activity including biking or water exercises.      Review of Systems:   General: WNL  Eyes: WNL  Ears/Nose/Throat: WNL  Lungs: WNL  Heart: Irregular Heartbeat, Shortness of Breath with  activity  Stomach: WNL  Bladder: WNL  Muscle/Joints: WNL  Skin: WNL  Nervous System: WNL  Mental Health: WNL     Blood: WNL     Patient Active Problem List   Diagnosis     Nephrolithiasis     Migraine Headache     Diverticulosis     Irritable Bowel Syndrome     Osteoarthritis Of The Knee     Palpitations     Colloid Nontoxic Multinodular Goiter     Adjustment Disorder     Obstructive Sleep Apnea     Female Stress Incontinence     Osteopenia     Hyperlipidemia     Hypothyroidism     Venous Insufficiency     Osteoarthritis     Edema     Lower Back Pain     Vitamin D deficiency     Lymphocytic colitis     Paroxysmal atrial fibrillation     Essential hypertension with goal blood pressure less than 140/90     Mitral valve stenosis, mild     Primary osteoarthritis of one knee, right     Heart failure with preserved ejection fraction     Dyspnea on exertion       Past Medical History:   Diagnosis Date     Adjustment disorder      Arthritis      Atrial fibrillation      CHF (congestive heart failure) 7/15/2016     Gall stones      Hemorrhoids      History of anesthesia complications     slow to wake up      HLD (hyperlipidemia)      HTN (hypertension)      Hypothyroid      IBS (irritable bowel syndrome)      Migraine      Mitral valve stenosis      Nephrolithiasis      Osteopenia      Paroxysmal atrial fibrillation      Sleep apnea        Past Surgical History:   Procedure Laterality Date     CHOLECYSTECTOMY       ID TOTAL KNEE ARTHROPLASTY Right 3/14/2017    Procedure: RIGHT TOTAL KNEE ARTHROPLASTY;  Surgeon: Peña Chacon MD;  Location: [facility];  Service: Orthopedics     TUBAL LIGATION         Family History   Problem Relation Age of Onset     Heart disease Mother      Hyperlipidemia Mother      Breast cancer Mother      Heart disease Father      Hyperlipidemia Father      Cancer Sister      Hyperlipidemia Sister      Breast cancer Paternal Aunt        Social History     Social History     Marital status:       Spouse name: N/A     Number of children: N/A     Years of education: N/A     Occupational History     Not on file.     Social History Main Topics     Smoking status: Former Smoker     Smokeless tobacco: Never Used      Comment: no second hand smoke exposure     Alcohol use 0.6 - 1.2 oz/week     1 - 2 Glasses of wine per week     Drug use: No     Sexual activity: Not on file     Other Topics Concern     Not on file     Social History Narrative       Current Outpatient Prescriptions   Medication Sig Dispense Refill     acetaminophen (TYLENOL) 325 MG tablet Take 650 mg by mouth every 6 (six) hours as needed for pain.       azithromycin (ZITHROMAX) 500 MG tablet Take 1 tablet 1 hour before dental procedure1 1 tablet 5     cholecalciferol, vitamin D3, (VITAMIN D3) 2,000 unit Tab Take 2,000 Units by mouth daily.        citalopram (CELEXA) 20 MG tablet Take 1 tablet (20 mg total) by mouth daily. 90 tablet 3     ferrous sulfate 325 (65 FE) MG tablet Take 1 tablet (325 mg total) by mouth daily with breakfast. 30 tablet 3     furosemide (LASIX) 40 MG tablet Two tabs in AM. One in afternoon 270 tablet 3     levothyroxine (SYNTHROID, LEVOTHROID) 75 MCG tablet Take 75 mcg by mouth daily.       pantoprazole (PROTONIX) 40 MG tablet Take 20 mg by mouth daily.        potassium chloride SA (K-DUR,KLOR-CON) 20 MEQ tablet Take 1 tablet (20 mEq total) by mouth daily. 90 tablet 3     rivaroxaban 20 mg Tab Take 20 mg by mouth daily with supper.       simvastatin (ZOCOR) 20 MG tablet Take 20 mg by mouth daily.       sotalol (BETAPACE) 80 MG tablet TAKE 1 TABLET(80 MG) BY MOUTH TWICE DAILY 180 tablet 1     traMADol (ULTRAM) 50 mg tablet TAKE 1 TABLET(50 MG) BY MOUTH EVERY 6 HOURS AS NEEDED FOR PAIN 40 tablet 1     triamcinolone (KENALOG) 0.1 % cream Apply twice daily to involved area until resolved 80 g 2     No current facility-administered medications for this visit.        Allergies   Allergen Reactions     Penicillins Hives        Objective:     Vitals:    03/09/18 1044   BP: 118/72   Pulse: 88   Resp: 18     Wt Readings from Last 3 Encounters:   03/09/18 153 lb (69.4 kg)   02/19/18 149 lb 7 oz (67.8 kg)   01/30/18 151 lb (68.5 kg)       General Appearance:   Alert, cooperative and in no acute distress.   HEENT:  No scleral icterus; the mucous membranes were pink and moist.   Neck: JVP normal. No HJR.   Chest: The spine was straight. The chest was symmetric.   Lungs:   Respirations unlabored; the lungs are clear to auscultation.   Cardiovascular:   Regular rhythm. S1 and S2 without murmur, clicks or rubs. Radial and posterior tibial pulses are intact and symmetrical.    Abdomen:  Soft, nontender, nondistended, bowel sounds present   Extremities: No cyanosis, clubbing. Trace bilateral lower extremity edema.   Skin: No xanthelasma.   Neurologic: Mood and affect are appropriate.         Lab Review   Lab Results   Component Value Date    CREATININE 0.76 02/19/2018    BUN 16 02/19/2018     02/19/2018    K 4.2 02/19/2018    CL 98 02/19/2018    CO2 30 02/19/2018     Lab Results   Component Value Date     (H) 01/14/2018     BNP (pg/mL)   Date Value   01/14/2018 461 (H)   11/30/2017 421 (H)   10/21/2017 653 (H)     Creatinine (mg/dL)   Date Value   02/19/2018 0.76   01/14/2018 0.79   11/30/2017 0.71   10/22/2017 0.79       Cardiographics  Echocardiogram: 10/22/2017  Summary     When compared to the previous study dated 7/16/2016, no significant change    Left ventricle ejection fraction is normal. The estimated left ventricular ejection fraction is 65%.    Moderate mitral annular calcification with mild to moderate mitral regurgitation    Minimal aortic stenosis with trace aortic insufficiency     NM pharmacological stress test 2/23/2018  Conclusion     The exercise nuclear stress test is negative for inducible myocardial ischemia or infarction.    The left ventricular ejection fraction is 63%.    When compared to the images of  7/21/2016, no interval change.             25 minutes were spent face to face with the patient with greater than 50% spent on education and counseling.      Priscila Morse, [facility]   Heart Failure Clinic

## 2021-06-16 NOTE — PROGRESS NOTES
ASSESSMENT:  1.  Congestive heart failure due to diastolic dysfunction: She appears well compensated on current regimen.    2.  Iron deficiency anemia: Presumably related to occult GI blood loss.  She is on chronic anticoagulation due to atrial fibrillation.  Last colonoscopy was 2009 with upper GI endoscopy 2008.  She was encouraged to check stool Hemoccults as had been advised previously.  If positive, consideration would be given to rechecking a GI evaluation.    3.  Paroxysmal atrial fibrillation: Clinically she is in sinus rhythm.  If iron deficiency remains a problem, consideration could be given to placing a watchman device in the future to diminish the risks for occult GI blood loss due to anticoagulation    4.  Pruritic dermatitis upper back: This is related to dry skin and chronic excoriation.  Management was discussed.    5.  Respiratory infection: Possibly due to influenza.  Symptoms are improving    PLAN:  1.  Check stool Hemoccult  2.  Labs as below for medication monitoring and follow-up of iron deficiency anemia  3.  Avoid excessive soap to the back.  Use emollients daily.  Triamcinolone 0.1% cream twice daily to involved area.  Avoid further excoriation.  4.  She will be notified of test results.  Clinic follow-up 3 months or as needed.    Orders Placed This Encounter   Procedures     Basic Metabolic Panel     HM2(CBC w/o Differential)     There are no discontinued medications.    No Follow-up on file.    ASSESSED PROBLEMS:  1. Iron deficiency anemia  HM2(CBC w/o Differential)   2. Medication monitoring encounter  Basic Metabolic Panel   3. Chronic diastolic congestive heart failure  Basic Metabolic Panel   4. Dermatitis  triamcinolone (KENALOG) 0.1 % cream       CHIEF COMPLAINT:  Chief Complaint   Patient presents with     Follow-up       HISTORY OF PRESENT ILLNESS:  Radha is a 82 y.o. female presenting to the clinic today for follow up of anemia and paroxysmal atrial fibrillation.    Dermatitis:  She endorses a bumpy rash on her back that is itchy for her. She is unsure exactly when it started, but she does feel this has been present for a while. She has been applying OTC hydrocortisone to the area. She does apply lotion to her skin after showers regularly.     Sciatic Nerve Pain: She endorses occasional sciatic nerve pain. She was previously doing physical therapy for that, and feels it did help. She has not been doing that since her cold symptoms started within the last week.     Chronic Diastolic Congestive Heart Failure: She endorses shallow breathing. She denies swelling in her ankles. She continues on furosemide 120 mg daily. She does not mention adverse side effects.     Cold Symptoms: She endorses a productive cough and fatigue that started last week. She states that she was in bed all last week. She feels her symptoms are gradually resolving. She endorses shallow breathing and occasional wheezing.     Anemia: She continues on iron supplements daily. She does not mention any problems with the supplement.    REVIEW OF SYSTEMS:   She continues wearing compression stockings. She denies heart burn symptoms lately. All other systems are negative.    PFSH:  Reviewed, as below.    TOBACCO USE:  History   Smoking Status     Never Smoker   Smokeless Tobacco     Never Used     Comment: no second hand smoke exposure       VITALS:  Vitals:    02/19/18 1154   BP: 104/68   Patient Site: Left Arm   Patient Position: Sitting   Cuff Size: Adult Regular   Pulse: 84   Weight: 149 lb 7 oz (67.8 kg)     Wt Readings from Last 3 Encounters:   02/19/18 149 lb 7 oz (67.8 kg)   01/30/18 151 lb (68.5 kg)   01/17/18 148 lb (67.1 kg)     Body mass index is 25.65 kg/(m^2).    PHYSICAL EXAM:  Constitutional:   Reveals a talkative woman with intermittent cough. Affect appropriate. Does not seem to be in respiratory distress.  Vitals: per nursing notes.  HEENT:  Ears:  External canals, TMs clear.    Eyes:  EOMs full,  PERRL.  Oropharynx:   Mouth and throat clear, no thrush or exudate.  Neck:  Supple, no carotid bruits or adenopathy.  Back:  Mild scoliosis  Thorax:  No bony deformities.  Lungs: Soft rhonchi in first expiration, otherwise clear  Cardiac: I-II/VI systolic murmur at left sternal border to aorta  Breasts:   No masses, no axillary adenopathy.  Abdomen:  Soft, active bowel sounds without bruits, mass, or tenderness.  Extremities:   No peripheral edema, pulses in the feet intact.    Skin: Erythematous raised patch with excoriation on right upper back, no abnormal pallor.   Neuro:  Alert and oriented. No gross focal deficits.  Psychiatric:  Memory intact, mood appropriate.      ADDITIONAL HISTORY SUMMARIZED (2): Cardiology note 1/30/18 reviewed, atrial fibrillation and exertional dyspnea. Colonoscopy 5/22/15 reviewed, diverticulosis and colon polyps.  DECISION TO OBTAIN EXTRA INFORMATION (1): None.   RADIOLOGY TESTS (1): None.  LABS (1): Labs 1/15/18 reviewed, iron 31, transferrin 422. Labs ordered today.   MEDICINE TESTS (1): None.  INDEPENDENT REVIEW (2 each): None.     The visit lasted a total of 23 minutes face to face with the patient. Over 50% of the time was spent counseling and educating the patient about recent cold symptoms, anemia, rash, sciatic nerve pain, and heart failure.    ILizeth, am scribing for and in the presence of, Dr. Rodriguez.    IBrando, personally performed the services described in this documentation, as scribed by Lizeth Lea in my presence, and it is both accurate and complete.    Dragon dictation was used for this note.  Speech recognition errors are a possibility.    MEDICATIONS:  Current Outpatient Prescriptions   Medication Sig Dispense Refill     acetaminophen (TYLENOL) 325 MG tablet Take 650 mg by mouth every 6 (six) hours as needed for pain.       azithromycin (ZITHROMAX) 500 MG tablet Take 1 tablet 1 hour before dental procedure1 1 tablet 5     cholecalciferol,  vitamin D3, (VITAMIN D3) 2,000 unit Tab Take 2,000 Units by mouth daily.        citalopram (CELEXA) 20 MG tablet Take 1 tablet (20 mg total) by mouth daily. 90 tablet 3     ferrous sulfate 325 (65 FE) MG tablet Take 1 tablet (325 mg total) by mouth daily with breakfast. 30 tablet 3     furosemide (LASIX) 40 MG tablet Two tabs in AM. One in afternoon 270 tablet 3     levothyroxine (SYNTHROID, LEVOTHROID) 75 MCG tablet Take 75 mcg by mouth daily.       pantoprazole (PROTONIX) 40 MG tablet Take 20 mg by mouth daily.        potassium chloride SA (K-DUR,KLOR-CON) 20 MEQ tablet Take 1 tablet (20 mEq total) by mouth daily. 90 tablet 3     rivaroxaban 20 mg Tab Take 20 mg by mouth daily with supper.       simvastatin (ZOCOR) 20 MG tablet Take 20 mg by mouth daily.       sotalol (BETAPACE) 80 MG tablet TAKE 1 TABLET(80 MG) BY MOUTH TWICE DAILY 180 tablet 1     traMADol (ULTRAM) 50 mg tablet TAKE 1 TABLET(50 MG) BY MOUTH EVERY 6 HOURS AS NEEDED FOR PAIN 40 tablet 1     triamcinolone (KENALOG) 0.1 % cream Apply twice daily to involved area until resolved 80 g 2     No current facility-administered medications for this visit.        Total data points: 3

## 2021-06-17 NOTE — PROGRESS NOTES
Pulmonary Clinic Visit    Cc: shortness of breath    HPI: 82 y.o. female with history of HFpEF, mitral valve stenosis who presents with steadily worsening shortness of breath.    She reports walking to the end of a city block on a flat surface but would have to rest. Climb 1 flight of stairs but slowly. Occasional wheezing. Meets a friend of hers on [address] at [employer], parking lot 1 block away and she has a hard time walking all the way to the restaurant. Likely occurring for over 1 year. She does cough daily but doesn't bother her.     Has been followed by Cardiology prior to this, found to have elevated filling pressures. Weight has been stable at 150, she does do daily weights. Stress test found no inducible ischemia.     Does admit she is not as active as she had been. She does go to [employer]. Moved from her home about 1.5 years ago and is now on all 1 level so less active than she was in her home.     Past Medical History:   Diagnosis Date     (HFpEF) heart failure with preserved ejection fraction 07/15/2016    Elevated filling pressures     Gall stones      Hemorrhoids      History of anesthesia complications     slow to wake up      HLD (hyperlipidemia)      HTN (hypertension)      Hypothyroid      IBS (irritable bowel syndrome)      Migraine      Mitral valve stenosis      Nephrolithiasis      Osteopenia      Paroxysmal atrial fibrillation      Sleep apnea          Social History   Substance Use Topics     Smoking status: Former Smoker     Packs/day: 0.50     Years: 20.00     Types: Cigarettes     Quit date: 1/1/1976     Smokeless tobacco: Never Used      Comment: no second hand smoke exposure     Alcohol use 0.6 - 1.2 oz/week     1 - 2 Glasses of wine per week         Family History   Problem Relation Age of Onset     Heart disease Mother      Hyperlipidemia Mother      Breast cancer Mother      Heart disease Father      Hyperlipidemia Father      Cancer Sister      Hyperlipidemia Sister      Breast cancer  Paternal Aunt      Lung disease Neg Hx          Current Outpatient Prescriptions   Medication Sig Note     acetaminophen (TYLENOL) 325 MG tablet Take 650 mg by mouth every 6 (six) hours as needed for pain.      azithromycin (ZITHROMAX) 500 MG tablet Take 1 tablet 1 hour before dental procedure1      cholecalciferol, vitamin D3, (VITAMIN D3) 2,000 unit Tab Take 2,000 Units by mouth daily.       citalopram (CELEXA) 20 MG tablet Take 1 tablet (20 mg total) by mouth daily.      ferrous sulfate 325 (65 FE) MG tablet 1 tablet daily. 4/30/2018: Received from: External Pharmacy     furosemide (LASIX) 40 MG tablet Take 80 mg by mouth every morning.      furosemide (LASIX) 40 MG tablet Take 40 mg by mouth Daily after lunch.      levothyroxine (SYNTHROID, LEVOTHROID) 75 MCG tablet Take 75 mcg by mouth Daily at 6:00 am.       levothyroxine (SYNTHROID, LEVOTHROID) 75 MCG tablet Take 1 tablet (75 mcg total) by mouth daily.      pantoprazole (PROTONIX) 40 MG tablet Take 40 mg by mouth daily before breakfast.       potassium chloride SA (K-DUR,KLOR-CON) 20 MEQ tablet Take 1 tablet (20 mEq total) by mouth daily.      rivaroxaban 20 mg Tab Take 20 mg by mouth daily with supper.      simvastatin (ZOCOR) 20 MG tablet Take 1 tablet (20 mg total) by mouth at bedtime.      sotalol (BETAPACE) 80 MG tablet Take 80 mg by mouth 2 (two) times a day.      traMADol (ULTRAM) 50 mg tablet Take 50 mg by mouth every 6 (six) hours as needed for pain.      triamcinolone (KENALOG) 0.1 % cream Apply 1 application topically 2 (two) times a day as needed. To dermatitis on back        Allergies   Allergen Reactions     Penicillins Hives         Review of Systems   Constitutional: Negative.    HENT: Negative.    Eyes: Negative.    Respiratory: Positive for shortness of breath.    Cardiovascular: Negative.    Gastrointestinal: Negative.    Endocrine: Negative.    Genitourinary: Negative.    Musculoskeletal: Negative.    Skin: Negative.     Allergic/Immunologic: Negative.    Neurological: Negative.    Hematological: Negative.    Psychiatric/Behavioral: Negative.        Vitals:    04/30/18 1558   BP: 122/78   Pulse: 80   Resp: 20   SpO2: 96%   Physical Exam   Constitutional: She is oriented to person, place, and time. She appears well-developed and well-nourished.   HENT:   Head: Normocephalic and atraumatic.   Mallampati IV   Eyes: Conjunctivae and EOM are normal.   Neck: Normal range of motion. No tracheal deviation present.   Cardiovascular: Normal rate and regular rhythm.    Pulmonary/Chest: Effort normal. She has no wheezes. She has no rales.   Abdominal: Soft. There is no tenderness.   Musculoskeletal: Normal range of motion. She exhibits no edema.   Arthritic fingers   Lymphadenopathy:     She has no cervical adenopathy.   Neurological: She is alert and oriented to person, place, and time.   Skin: Skin is warm and dry.   Psychiatric: She has a normal mood and affect. Her behavior is normal. Thought content normal.     PFT 4/30/18 personally reviewed with patient  FEV1/FVC is 0.72 and is normal.  FEV1 is 86% predicted and is normal.  FVC is 90% predicted and normal.  There was no significant change in spirometry after a single inhaled dose of bronchodilator.  TLC is 112% predicted and is normal.  RV is 142% predicted and is increased.  RV/TLC is 0.57 and is increased.  DLCO is 73% predicted and is reduced when it   is corrected for hemoglobin.  Impression: isolated mild reduction in diffusion capacity.       Assessment/Plan  82yoF with mild dyspnea. There is isolated diffusion capacity defect. This is likely not significantly, however will assess further with a high resolution CT scan of the chest. If negative, this is likely deconditioning and would benefit from ongoing activity (consider pulmonary rehab).    She prefers to return to clinic to review her CT scan in person.    >50% of this 60 minute visit spent in direct patient counseling and  coordination of care.     Aditi Altamirano MD  Electronically signed on 4/30/2018 4:13 PM

## 2021-06-17 NOTE — PROGRESS NOTES
ASSESSMENT:  1.  Hospital follow-up: Radha reports she fell when her shoe caught on the ground causing her to fall forward striking her face.  She denied syncope.  There is no evidence for significant cardiac arrhythmias during the hospital course.  She seems to be recovering well from the fall.  Her family has been concerned about postural instability.  Physical therapy to work on balance may be of benefit.  Holter monitor in January showed no pauses or worrisome arrhythmias    2.  Congestive heart failure due to diastolic dysfunction: She seems well compensated.  Her stress test in January showed no evidence for reversible ischemia.    3.  Moderate mitral stenosis: It was noticed on her recent echocardiogram.  This currently seems to be asymptomatic.  A recheck of the echo in 1 year would be advised.    4.  Osteoporosis: On Prolia.  Next injection seems due April 24    PLAN:  1.  Refer to PT/OT to work on balance  2.  Recheck cardiac echo 1 year  3.  Shingrix vaccine was advised.  Check insurance  4.  Next Prolia due next month  5.  Continue usual medications at current doses.  Clinic follow-up in July  Orders Placed This Encounter   Procedures     Ambulatory referral to PT/OT     Referral Priority:   Routine     Referral Type:   Physical Therapy or Occupational Therapy     Referral Reason:   Evaluation and Treatment     Requested Specialty:   Physical Therapy     Number of Visits Requested:   1     There are no discontinued medications.        ASSESSED PROBLEMS:  1. Postural instability  Ambulatory referral to PT/OT       CHIEF COMPLAINT:  Chief Complaint   Patient presents with     Follow-up     discharge 3/22/18       HISTORY OF PRESENT ILLNESS:  Radha is a 82 y.o. female presenting to the clinic today for follow up for inpatient care at [facility] from 3/21/18 - 3/22/18 following a fall. She reports that she was having lunch with a friend and tripped over her shoe in the parking lot falling onto  "her face. She denies syncope and says she remembers thinking \"not again\" as she was falling. She was taken to the ER. A head CT was negative for bleeding or stroke. Her orthostatic blood pressures were unremarkable. Telemetry showed no arrhythmias. She is on Xarelto for paroxysmal atrial fibrillation, which was held during her hospital stay. Serial troponins and EKG were also unremarkable. She had lacerations to her head and was treated with dissolvable sutures. She had a repeat head CT the morning of discharge which was unchanged.     Since returning from the hospital, she notes that her neck is stiff and a painful knee is bothering her more. She is due for follow up with "[employer]. She says her children are concerned about her balance. She says she is moving slow but improving.     She reports a headache for the past two days.    An ECHO performed during her hospital encounter showed      When compared to the previous study dated 10/22/2017, Mitral valve appears moderate stenosis.    Normal left ventricular size, mild LVH and normal wall motion. Significantly elevated left ventricular filling pressure. Left ventricle ejection fraction is normal. The calculated left ventricular ejection fraction is 67%.    Normal right ventricular size and systolic function.    Moderately enlarged left atrium.    Tricuspid aortic valve with thickening and calcification. Mild aortic valve stenosis and trace regurgitation.    Calcified and thickned mitral valve leadlets and moderate calcified mitral annulus. Moderate mitral valve stenosis with mean gradient of 7 mmHg at heart rate 75 BPM. Mild mitrlav alve regurgitation.     A stress test of 2/23/18 showed no change from her previous test of July 2016; LVEF was 63%.     Health Maintenance: She was advised to research her insurance coverage for Shingrix.     REVIEW OF SYSTEMS:   Her ankle swelling is controlled. She says her breathing is fine but notes that she has been taking it " easy.   All other systems are negative.    PFSH:  .  Lives independently    TOBACCO USE:  History   Smoking Status     Former Smoker   Smokeless Tobacco     Never Used     Comment: no second hand smoke exposure       VITALS:  Vitals:    03/29/18 1133   BP: 118/72   Patient Site: Left Arm   Patient Position: Sitting   Cuff Size: Adult Regular   Pulse: 72   SpO2: 98%   Weight: 151 lb 5 oz (68.6 kg)     Wt Readings from Last 3 Encounters:   03/29/18 151 lb 5 oz (68.6 kg)   03/22/18 151 lb 3.2 oz (68.6 kg)   03/09/18 153 lb (69.4 kg)     Body mass index is 25.97 kg/(m^2).      MEDICATIONS:  Current Outpatient Prescriptions   Medication Sig Dispense Refill     acetaminophen (TYLENOL) 325 MG tablet Take 650 mg by mouth every 6 (six) hours as needed for pain.       azithromycin (ZITHROMAX) 500 MG tablet Take 1 tablet 1 hour before dental procedure1 1 tablet 5     cholecalciferol, vitamin D3, (VITAMIN D3) 2,000 unit Tab Take 2,000 Units by mouth daily.        citalopram (CELEXA) 20 MG tablet Take 1 tablet (20 mg total) by mouth daily. 90 tablet 3     furosemide (LASIX) 40 MG tablet Take 80 mg by mouth every morning.       furosemide (LASIX) 40 MG tablet Take 40 mg by mouth Daily after lunch.       levothyroxine (SYNTHROID, LEVOTHROID) 75 MCG tablet Take 75 mcg by mouth Daily at 6:00 am.        pantoprazole (PROTONIX) 40 MG tablet Take 40 mg by mouth daily before breakfast.        potassium chloride SA (K-DUR,KLOR-CON) 20 MEQ tablet Take 1 tablet (20 mEq total) by mouth daily. 90 tablet 3     rivaroxaban 20 mg Tab Take 20 mg by mouth daily with supper.       simvastatin (ZOCOR) 20 MG tablet Take 1 tablet (20 mg total) by mouth at bedtime. 90 tablet 3     sotalol (BETAPACE) 80 MG tablet Take 80 mg by mouth 2 (two) times a day.       traMADol (ULTRAM) 50 mg tablet Take 50 mg by mouth every 6 (six) hours as needed for pain.       triamcinolone (KENALOG) 0.1 % cream Apply 1 application topically 2 (two) times a day as  needed. To dermatitis on back       No current facility-administered medications for this visit.        PHYSICAL EXAM:  Constitutional:   Reveals an alert, pleasant, talkative woman. Affect appropriate. Does not seem acutely ill. Vitals: per nursing notes.  HEENT: Left periorbital ecchymosis and healing abrasions.  Ears:  External canals clear.  No hemotympanum.   Eyes:  EOMs full, PERRL.  Neck:  Supple with minor discomfort on flexion and extension.   Back:  No abnormal kyphosis.   Lungs: Clear to A&P without rales or wheezes.  Respiratory effort normal.  Cardiac:   Regular rhythm, II/VI systolic murmur left sternal border.   Abdomen:  Soft, no mass or tenderness. Right upper quadrant scar.   Extremities:   No edema. 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       ADDITIONAL HISTORY SUMMARIZED (2): Reviewed hospital course of 3/21/18 - 3/22/18.  DECISION TO OBTAIN EXTRA INFORMATION (1): None.   RADIOLOGY TESTS (1): None.  LABS (1): Labs performed during hospital encounter reviewed.   MEDICINE TESTS (1): Reviewed ECHO of 3/22/18 and stress test of 2/23/18.  INDEPENDENT REVIEW (2 each): None.     The visit lasted a total of 19 minutes face to face with the patient. Over 50% of the time was spent counseling and educating the patient about her recent hospital encounter.    I, Jermaine Rubi, am scribing for and in the presence of, Dr. Rodriguez.    IBrando, personally performed the services described in this documentation, as scribed by Jermaine Rubi in my presence, and it is both accurate and complete.    Dragon dictation was used for this note.  Speech recognition errors are a possibility.      Total data points: 4

## 2021-06-18 NOTE — PROGRESS NOTES
Documentation only note:  [facility] Tracker alert for a 2# wt loss since yesterday.  No reported s/s.  Today's wt 152#, which is w/in pt's wt parameters of 150-152#.  As wt is down, pt is w/o s/s, & recently seen by [facility] NP Priscila Morse 6/12/18 (no changes in meds; f/u in 4 mths w/HF NP, & primary cardiologist, Dr. Paulson in August), no call made.  RN will continue monitoring MHT report for variances & f/u PRN.      Marie Catherine RN Care Manager, Population Health

## 2021-06-19 NOTE — PROGRESS NOTES
Pulmonary Clinic Follow Up    Cc: shortness of breath    HPI: 82-year-old female with history of heart failure with preserved EF, mitral valve stenosis, who returns with ongoing dyspnea.  Her symptoms have not changed since she visited a few months ago.    At her previous visit we reviewed her pulmonary function tests that showed air trapping but no evidence of significant obstruction and mildly decreased diffusion capacity at 73% predicted.    CT scan was ordered to assess her slightly decreased diffusion capacity.  This was reviewed today in clinic.  There was evidence of air trapping which was also supported by her PFTs.  I am curious if that may be contributing to her dyspnea as she is not short of breath at rest.  She denies any wheezing or coughing.  The CT scan also showed an apical scar that is likely benign but given its odd appearance was recommended to be followed.    Current Outpatient Prescriptions   Medication Sig Note     acetaminophen (TYLENOL) 325 MG tablet Take 650 mg by mouth every 6 (six) hours as needed for pain.      cholecalciferol, vitamin D3, (VITAMIN D3) 2,000 unit Tab Take 2,000 Units by mouth daily.       citalopram (CELEXA) 20 MG tablet Take 1 tablet (20 mg total) by mouth daily.      ferrous sulfate 325 (65 FE) MG tablet 1 tablet daily. 4/30/2018: Received from: External Pharmacy     furosemide (LASIX) 40 MG tablet Take 80 mg by mouth every morning.      furosemide (LASIX) 40 MG tablet Take 40 mg by mouth Daily after lunch.      levothyroxine (SYNTHROID, LEVOTHROID) 75 MCG tablet Take 1 tablet (75 mcg total) by mouth daily.      pantoprazole (PROTONIX) 40 MG tablet Take 40 mg by mouth daily before breakfast.       potassium chloride SA (K-DUR,KLOR-CON) 20 MEQ tablet Take 1 tablet (20 mEq total) by mouth daily.      rivaroxaban 20 mg Tab Take 1 tablet (20 mg total) by mouth daily with supper.      simvastatin (ZOCOR) 20 MG tablet Take 1 tablet (20 mg total) by mouth at bedtime.       sotalol (BETAPACE) 80 MG tablet TAKE 1 TABLET(80 MG) BY MOUTH TWICE DAILY      fluticasone-vilanterol (BREO ELLIPTA) 100-25 mcg/dose DsDv inhaler Inhale 1 puff daily.      pantoprazole (PROTONIX) 40 MG tablet TAKE 1 TABLET BY MOUTH DAILY      Vitals:    06/29/18 0949   BP: 102/68   Pulse: 76   Resp: 24   SpO2: 97%   RA    CT Chest: personally reviewed with patient. Apical linear scar. Diffuse air trapping on expiratory cuts. No ILD. They mention mild pulmonary trunk enlargement, however the patient's echo found no pulmonary hypertension.    ASSESSMENT/PLAN:  1) Dyspnea on exertion possibly due to small airways disease.  -Trial of Breo. It can interact with her sotolol so will repeat EKG next to ensure her QTc is ok.  -If no improvement, stop it.  -If it does help, then continue    2) Lung scar  -repeat CT scan in 1 year, this is unlikely malignancy.    Aditi Altamirano MD  Electronically signed on 7/6/2018 1:15 PM    >50% of this 30 minute visit spent in direct counseling and coordination of care.

## 2021-06-19 NOTE — PROGRESS NOTES
"Patient here for EKG.  Was started on Breo and also taking Sotolol which can interact.   EKG ordered by Dr. Altamirano.  EKG done and reviewed with  in clinic.  Stated EKG looks \"good\".  Patient instructed to continue taking both medications as prescribed and we will call if any further instructions from Dr. Altamirano.  "

## 2021-06-19 NOTE — LETTER
Letter by Lory Paulson MD at      Author: Lory Paulson MD Service: -- Author Type: --    Filed:  Encounter Date: 7/24/2019 Status: (Other)         Radha Arnold  [address]  [place] [address]      July 24, 2019      Dear Radha,    This letter is to remind you that you will be due for your follow up appointment with Dr. Chavez Paulson  . To help ensure you are in the best health possible, a regular follow-up with your cardiologist is essential.     Please call our Patient Scheduling Line at [phone] to schedule your appointment at your earliest convenience.  If you have recently scheduled an appointment, please disregard this letter.    We look forward to seeing you again. As always, we are available at the number  above for any questions or concerns you may have.      Sincerely,     The Physicians and Staff of [facility]

## 2021-06-20 NOTE — LETTER
Letter by Neelam Barnes RN at      Author: Neelam Barnes RN Service: -- Author Type: --    Filed:  Encounter Date: 10/8/2020 Status: (Other)         Radha Arnold  [address]  [place] [address]      10/08/20      Dear Reinaldo Garcia from the [facility]!  Our records show that you are overdue for a follow-up CT scan of your lungs. Please call us when it is most convenient to schedule your scan.  Phone:    [phone]     If you need to cancel or reschedule your appointment, please notify us at least 24 hours prior to your appointment time so we are able to make this time available for another patient.     Thank you for choosing [facility] for your health care needs.     Sincerely,  [facility] Staff

## 2021-06-20 NOTE — LETTER
Letter by Severson, Tammie F, LPN at      Author: Severson, Tammie F, LPN Service: -- Author Type: --    Filed:  Encounter Date: 7/23/2020 Status: (Other)       7/23/2020        Radha Arnold  [address]  [place] [address]    This letter provides a written record that you were tested for COVID-19 on 7/17/20.     Your result was negative. This means that we didnt find the virus that causes COVID-19 in your sample. A test may show negative when you do actually have the virus. This can happen when the virus is in the early stages of infection, before you feel illness symptoms.    If you have symptoms   Stay home and away from others (self-isolate) until you meet ALL of the guidelines below:    Youve had no fever--and no medicine that reduces fever--for 3 full days (72 hours). And ?    Your other symptoms have gotten better. For example, your cough or breathing has improved. And?    At least 10 days have passed since your symptoms started.    During this time:    Stay home. Dont go to work, school or anywhere else.     Stay in your own room, including for meals. Use your own bathroom if you can.    Stay away from others in your home. No hugging, kissing or shaking hands. No visitors.    Clean high touch surfaces often (doorknobs, counters, handles, etc.). Use a household cleaning spray or wipes. You can find a full list on the EPA website at www.epa.gov/pesticide-registration/list-n-disinfectants-use-against-sars-cov-2.    Cover your mouth and nose with a mask, tissue or washcloth to avoid spreading germs.    Wash your hands and face often with soap and water.    Going back to work  Check with your employer for any guidelines to follow for going back to work.    Employers: This document serves as formal notice that your employee tested negative for COVID-19, as of the testing date shown above.

## 2021-06-21 NOTE — PROGRESS NOTES
The following steps were completed to comply with the REMS program for Prolia:  1. Ordering provider has previously reviewed information in the Medication Guide and Patient Counseling Chart, including the serious risks of Prolia  and the symptoms of each risk and have been advised  to seek prompt medical attention if they have signs or symptoms of any of the serious risks.  2. Provided each patient a copy of the Medication Guide and Patient Brochure.  See MAR for administration details.   Indication: Prolia  (denosumab) is a prescription medicine used to treat osteoporosis in patients who:   Are at high risk for fracture, meaning patients who have had a fracture related to osteoporosis, or who have multiple risk factors for fracture; Cannot use another osteoporosis medicine or other osteoporosis medicines did not work well.   The timeline for early/late injections would be 4 weeks early and any time after the 6 month nadira. If a patient receives their injection late, then the subsequent injection would be 6 months from the date that they actually received the injection    1.  When was the last injection?  4/26/18    2.  Has insurance for this injection been verified?  Yes    3.  Did you experience any new onset achiness or rashes that lasted for over a month with your previous Prolia injection?   No    4.  Do you have a fever over 101?F or a new deep cough that is unusual for you today? No    5.  Have you started any new medications in the last 6 months that you were told could affect your immune system? These may have been prescribed by oncologist, transplant, rheumatology, or dermatology.   No    6.  In the last 6 months have you have gastric bypass or parathyroid surgery?   No    7.  Do you plan dental work requiring drilling into the bone such as implants/extractions or oral surgery in the next 2-3 months?   No

## 2021-06-21 NOTE — LETTER
Letter by Neelam Barnes RN at      Author: Neelam Barnes RN Service: -- Author Type: --    Filed:  Encounter Date: 11/5/2020 Status: (Other)       Radha Arnold  [address]  [place] [address]        11/05/20        Dear Reinaldo Garcia from the [facility]!  Our records show that you are overdue for a follow-up CT scan of your lungs. Please call us when it is most convenient to schedule your scan.  Phone:    [phone]     If you need to cancel or reschedule your appointment, please notify us at least 24 hours prior to your appointment time so we are able to make this time available for another patient.     Thank you for choosing [facility] for your health care needs.     Sincerely,  [facility] Staff
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
## 2021-06-22 NOTE — TELEPHONE ENCOUNTER
----- Message from Baltazar Fisher sent at 1/9/2019 12:56 PM CST -----  Contact: Patient  General phone call:    Caller:  Patient   Primary cardiologist: Khurram  Detailed reason for call: Patient saw Primary care Dr. Nomna Mittal on 1/7 and patient was told to let  know that she has an Aortic Dialation per Dr. Mittal? Patient is calling with the information  New or active symptoms? No  Best phone number:  [phone]  Best time to contact: anytime  Ok to leave a detailed message? Yes  Device? No    Additional Info:

## 2021-06-22 NOTE — TELEPHONE ENCOUNTER
Who is calling:  Nalini East  Reason for Call:  Please discuss the finding's of MRI about the dilated aorta at today's appointment.  Is this new?  Does it need follow up?    Date of last appointment with primary care: NA  Has the patient been recently seen:  NA  Okay to leave a detailed message: Yes [phone]

## 2021-06-22 NOTE — PATIENT INSTRUCTIONS - HE
Whole Foods, Plant-Based Diet    Eat abundant vegetables.    Only eat whole grains.    2-4 fruits/day.    Enjoy at least 2 servings of legumes (beans) or tofu daily.      Avoid or greatly diminish animal products such as dairy, eggs and meat.     Supplement with 1,000mcg vitamin B12 and possibly a calcium/vitamin D supplement such as Caltrate+D3 daily.    Avoid processed foods, sugars and oils.    Cook your own food as much as possible.    [place] [employer] Documentary - watch online.    [email or web address] - information

## 2021-06-23 NOTE — PROGRESS NOTES
Pt on the MyHealth Tracker program has not completed a survey in several months. Due to lack of participation, she will be dis-enrolled.    Marie Catherine RN Care Manager, Population Health

## 2021-06-23 NOTE — TELEPHONE ENCOUNTER
Refill Approved    Rx renewed per Medication Renewal Policy. Medication was last renewed on 09 11 18  Lab Results   Component Value Date    HGB 12.7 01/04/2019     Lab Results   Component Value Date    IRON 31 (L) 01/15/2018    FERRITIN 17 01/15/2018         Trevin Mcclure Connection Triage/Med Refill 2/5/2019     Requested Prescriptions   Pending Prescriptions Disp Refills     ferrous sulfate 325 (65 FE) MG tablet [Pharmacy Med Name: FERROUS SULFATE 325MG (5GR) TABS] 30 tablet 0     Sig: TAKE 1 TABLET(325 MG) BY MOUTH DAILY WITH BREAKFAST    Iron Supplements Refill Protocol Passed - 2/2/2019  7:17 PM       Passed - PCP or prescribing provider visit in past 12 months      Last office visit with prescriber/PCP: 3/29/2018 Brando Rodriguez MD OR same dept: 1/7/2019 Tariq Mittal MD OR same specialty: 1/7/2019 Tariq Mittal MD  Last physical: 2/28/2017 Last MTM visit: Visit date not found   Next visit within 3 mo: Visit date not found  Next physical within 3 mo: Visit date not found  Prescriber OR PCP: Brando Rodriguez MD  Last diagnosis associated with med order: 1. Iron deficiency anemia  - ferrous sulfate 325 (65 FE) MG tablet [Pharmacy Med Name: FERROUS SULFATE 325MG (5GR) TABS]; TAKE 1 TABLET(325 MG) BY MOUTH DAILY WITH BREAKFAST  Dispense: 30 tablet; Refill: 0    If protocol passes may refill for 12 months if within 3 months of last provider visit (or a total of 15 months).            Passed - Hemoglobin or Hematocrit in last 12 months    Hemoglobin   Date Value Ref Range Status   01/04/2019 12.7 12.0 - 16.0 g/dL Final     Hematocrit   Date Value Ref Range Status   01/04/2019 38.5 35.0 - 47.0 % Final

## 2021-06-24 NOTE — TELEPHONE ENCOUNTER
Refill Approved    Rx renewed per Medication Renewal Policy. Medication was last renewed on 8/20/18.    Maddie Medrano, Care Connection Triage/Med Refill 2/25/2019     Requested Prescriptions   Pending Prescriptions Disp Refills     citalopram (CELEXA) 20 MG tablet [Pharmacy Med Name: CITALOPRAM 20MG TABLETS] 90 tablet 0     Sig: TAKE 1 TABLET(20 MG) BY MOUTH DAILY    SSRI Refill Protocol  Passed - 2/24/2019 11:15 AM       Passed - PCP or prescribing provider visit in last year    Last office visit with prescriber/PCP: 3/29/2018 Brando Rodriguez MD OR same dept: 1/7/2019 Tariq Mittal MD OR same specialty: 1/7/2019 Tariq Mittal MD  Last physical: 2/28/2017 Last MTM visit: Visit date not found   Next visit within 3 mo: Visit date not found  Next physical within 3 mo: Visit date not found  Prescriber OR PCP: Brando Rodriguez MD  Last diagnosis associated with med order: 1. Reactive depression  - citalopram (CELEXA) 20 MG tablet [Pharmacy Med Name: CITALOPRAM 20MG TABLETS]; TAKE 1 TABLET(20 MG) BY MOUTH DAILY  Dispense: 90 tablet; Refill: 0    If protocol passes may refill for 12 months if within 3 months of last provider visit (or a total of 15 months).

## 2021-06-25 NOTE — TELEPHONE ENCOUNTER
Refill Approved    Rx renewed per Medication Renewal Policy. Medication was last renewed on 3/13/20, last OV 7/14/20.    Belem Yeboah, Care Connection Triage/Med Refill 5/31/2021     Requested Prescriptions   Pending Prescriptions Disp Refills     pantoprazole (PROTONIX) 40 MG tablet [Pharmacy Med Name: PANTOPRAZOLE 40MG TABLETS] 90 tablet 3     Sig: TAKE 1 TABLET(40 MG) BY MOUTH DAILY       GI Medications Refill Protocol Passed - 5/30/2021  4:41 PM        Passed - PCP or prescribing provider visit in last 12 or next 3 months.     Last office visit with prescriber/PCP: 12/31/2019 Brando Rodriguez MD OR same dept: Visit date not found OR same specialty: 12/31/2019 Brando Rodriguez MD  Last physical: Visit date not found Last MTM visit: Visit date not found   Next visit within 3 mo: Visit date not found  Next physical within 3 mo: Visit date not found  Prescriber OR PCP: Brando Rodriguez MD  Last diagnosis associated with med order: 1. Esophageal reflux  - pantoprazole (PROTONIX) 40 MG tablet [Pharmacy Med Name: PANTOPRAZOLE 40MG TABLETS]; TAKE 1 TABLET(40 MG) BY MOUTH DAILY  Dispense: 90 tablet; Refill: 3    If protocol passes may refill for 12 months if within 3 months of last provider visit (or a total of 15 months).

## 2021-06-25 NOTE — PROGRESS NOTES
Progress Notes by Lory Paulson MD at 3/7/2017  1:50 PM     Author: Lory Paulson MD Service: -- Author Type: Physician    Filed: 3/7/2017  2:06 PM Encounter Date: 3/7/2017 Status: Signed    : Lory Paulson MD (Physician)                  [email or web address]           Thank you Dr. Rodriguez for asking the [facility] Heart Care team to participate in the care of your patient, Radha Arnold.     Impression and Plan     1. Atrial fibrillation. Patient with newly diagnosed atrial fibrillation in July 2016. Radha had spontaneously converted after additional diagnoses. She has been maintained on sotalol which has been efficacious in maintaining sinus rhythm. QTc is reasonable on ECG today at 4483 ms (manually calculated). For now recommend:    Continuation sotalol 80 mg twice a day    Continue dabigatran etexilate (Pradaxa ) for CVA prophylaxis in the event of recurrence particularly given patient is somewhat unaware of the rhythm disturbance.     2. Hypertension.  Blood pressure is very reasonable knee office today at 112/64 mmHg.    Of note, Radha is being considered for right knee replacement vis-à-vis advanced osteoarthritis.  She was seen by her primary provider, Dr. Brando Rodriguez, preoperatively and agree with management plan as outlined.  Specifically, do not feel any cardiac workup is required in advance of surgery.  Parenthetically, patient did undergo a nuclear perfusion study within the last year which was normal (see Cardiac Diagnostics section below).    Plan on follow-up in one year.           History of Present Illness    Once again I would like to thank you again for asking me to participate in the care of your patient, Radha Arnold.  As you know, but to reiterate for my own records, Radha Arnold is a 81 y.o. female who was seen in consultation 16 July 2016 for newly diagnosed atrial fibrillation.      On interview, patient reports no subjective recurrence of the  "atrial fibrillation though it is worth noting that the Radha historically has been somewhat asymptomatic in unaware of the rhythm disturbance. Presently, Radha denies any lightheadedness. She continues to have some mild lower extremity edema though this is stable and consistent with dependent edema. She reports no chest pain symptoms. Patient reports only occasional palpitations, but no sustained rapid irregular heart rates that she can appreciate.    Further review of systems is otherwise negative/noncontributory (medical record and 13 point review of systems reviewed as well and pertinent positives noted).         Cardiac Diagnostics      Regadenoson nuclear perfusion study 21 July 2016:  1. No evidence of infarct or ischemia.  2. Normal left ventricular systolic performance with ejection fraction of 70%.     Echocardiogram 16 July 2016:  1. Normal left ventricular size and systolic performance with ejection fraction of 60%.  2. Mild aortic insufficiency.  3. Mild to moderate mitral insufficiency.  4. Mild mitral stenosis with mean gradient of 4 mmHg.  5. Normal right ventricular size and systolic performance.  6. Mild left atrial enlargement. Right atrium reported normal.     12-lead ECG (personally reviewed) 7 March 2017: Sinus rhythm with borderline first-degree AV block.  QTc 483 ms.    12-lead ECG (personally reviewed) 30 November 2016: Sinus rhythm with manually calculated QTc 423 ms.     12-lead ECG (personally reviewed) 20 July 2016: Sinus rhythm with heart rate of 70 bpm.          Physical Examination         Visit Vitals   ? /64 (Patient Site: Right Arm, Patient Position: Sitting, Cuff Size: Adult Regular)   ? Pulse 60   ? Resp 18   ? Ht 5' 3\" (1.6 m)   ? Wt 148 lb (67.1 kg)   ? BMI 26.22 kg/m2           Wt Readings from Last 3 Encounters:   03/07/17 148 lb (67.1 kg)   02/28/17 146 lb 14.4 oz (66.6 kg)   02/21/17 146 lb 14.4 oz (66.6 kg)     The patient is alert and oriented times three. Sclerae " are anicteric. Mucosal membranes are moist. Jugular venous pressure is normal. No significant adenopathy/thyromegally appreciated. Lungs are clear with good expansion. On cardiovascular exam, the patient has a regular S1 and S2. Abdomen is soft and non-tender. Extremities reveal no clubbing, cyanosis, with mild bilateral lower extremity edema with left slightly more so than right.         Family History/Social History/Risk Factors   Patient does not smoke.  Family history of hypertension.         Medications  Allergies   Current Outpatient Prescriptions   Medication Sig Dispense Refill   ? calcium carbonate-vitamin D2 500 mg(1,250mg) -200 unit tablet Take 1 tablet by mouth daily.     ? cholecalciferol, vitamin D3, (VITAMIN D3) 2,000 unit Tab Take 1,000 Units by mouth.      ? citalopram (CELEXA) 20 MG tablet Take 20 mg by mouth daily.     ? fluticasone (FLONASE) 50 mcg/actuation nasal spray 1 spray into each nostril as needed.      ? FOLIC ACID/MULTIVIT-MIN/LUTEIN (CENTRUM SILVER ORAL) Take 1 tablet by mouth daily.     ? furosemide (LASIX) 40 MG tablet Take 0.5 tablets (20 mg total) by mouth daily. (Patient taking differently: Take 40 mg by mouth daily. ) 90 tablet 3   ? levothyroxine (SYNTHROID, LEVOTHROID) 75 MCG tablet TAKE 1 TABLET BY MOUTH EVERY DAY 90 tablet 1   ? pantoprazole (PROTONIX) 40 MG tablet Take 40 mg by mouth daily.     ? raloxifene (EVISTA) 60 mg tablet TAKE 1 TABLET BY MOUTH DAILY 90 tablet 3   ? simvastatin (ZOCOR) 20 MG tablet TAKE 1 TABLET BY MOUTH DAILY 90 tablet 3   ? sotalol (BETAPACE) 80 MG tablet Take 1 tablet (80 mg total) by mouth 2 (two) times a day. 180 tablet 3   ? traMADol (ULTRAM) 50 mg tablet One to two tabs four times daily as needed for pain 50 tablet 1   ? XARELTO 20 mg Tab TAKE 1 TABLET BY MOUTH DAILY WITH DINNER 90 tablet 1     No current facility-administered medications for this visit.       Allergies   Allergen Reactions   ? Penicillins Hives          Lab Results   Lab  Results   Component Value Date     02/28/2017    K 4.1 02/28/2017     02/28/2017    CO2 26 02/28/2017    BUN 15 02/28/2017    CREATININE 0.73 02/28/2017    CALCIUM 9.2 02/28/2017     Lab Results   Component Value Date    WBC 4.9 02/28/2017    WBC 5.9 04/10/2015    HGB 13.1 02/28/2017    HCT 39.0 02/28/2017    MCV 87 02/28/2017     02/28/2017     Lab Results   Component Value Date    CHOL 151 07/17/2016    TRIG 218 (H) 07/17/2016    HDL 39 (L) 07/17/2016    LDLCALC 68 07/17/2016     Lab Results   Component Value Date    INR 1.31 (H) 08/08/2016     Lab Results   Component Value Date     (H) 02/21/2017     Lab Results   Component Value Date    TROPONINI <0.01 08/08/2016    TROPONINI 0.01 07/21/2016    TROPONINI 0.01 07/21/2016     Lab Results   Component Value Date    TSH 2.51 07/16/2016

## 2021-06-26 NOTE — PROGRESS NOTES
Progress Notes by Priscila Morse CNP at 6/12/2018 10:30 AM     Author: Priscila Morse CNP Service: -- Author Type: Nurse Practitioner    Filed: 6/12/2018 11:15 AM Encounter Date: 6/12/2018 Status: Signed    : Priscila Morse CNP (Nurse Practitioner)           Click to link to [facility] Heart [facility] HEART CARE NOTE      Assessment/Recommendations   Assessment:    1.  Heart failure with preserved ejection fraction, NYHA class III: Well compensated.  She continues to have dyspnea on exertion and fatigue.  Her weights have remained stable.  She continues to follow a low-sodium diet.  She continues to exercise on a regular basis.    2.  Hypertension: Controlled.  Blood pressure 108/74.    3.  Paroxysmal atrial fibrillation: Normal rhythm today.  She continues Xarelto 20 mg daily and sotalol 80 mg twice a day.    Plan:  1.  Continue current medications  2.  Continue daily weights and low-sodium diet  3.  Continue regular exercise    Radha Arnold will follow up with Dr. Paulson in August and in the heart failure clinic in 4 months or sooner if needed.     History of Present Illness    Ms. Radha Arnold is a 82 y.o. female seen at [facility] heart failure clinic today for continued follow-up.  She follows up for heart failure with preserved ejection fraction.  Her most recent echocardiogram was done March 22, 2018 which showed an ejection fraction of 67% with moderate mitral stenosis and mild aortic stenosis.  She has a past medical history significant for hypertension, hyperlipidemia, paroxysmal atrial fibrillation, and obstructive sleep apnea.    Today, she comes in with continued dyspnea on exertion and fatigue.  She denies any other acute heart failure symptoms.  She denies lightheadedness, shortness of breath, orthopnea, PND, palpitations, chest pain, abdominal fullness/bloating and lower extremity edema.      She is monitoring home weights which are stable between  150-152 pounds.  She is following a low sodium diet.  She participates in regular physical activity including going to [employer] twice a week and riding her stationary bike.        ECHO 3/22/2018:   Summary     When compared to the previous study dated 10/22/2017, Mitral valve appears moderate stenosis.    Normal left ventricular size, mild LVH and normal wall motion. Significantly elevated left ventricular filling pressure. Left ventricle ejection fraction is normal. The calculated left ventricular ejection fraction is 67%.    Normal right ventricular size and systolic function.    Moderately enlarged left atrium.    Tricuspid aortic valve with thickening and calcification. Mild aortic valve stenosis and trace regurgitation.    Calcified and thickned mitral valve leadlets and moderate calcified mitral annulus. Moderate mitral valve stenosis with mean gradient of 7 mmHg at heart rate 75 BPM. Mild mitrlav alve regurgitation.              Physical Examination Review of Systems   Vitals:    06/12/18 1035   BP: 108/74   Pulse: 84     Body mass index is 26.09 kg/(m^2).  Wt Readings from Last 3 Encounters:   06/12/18 152 lb (68.9 kg)   04/30/18 153 lb 14.4 oz (69.8 kg)   03/29/18 151 lb 5 oz (68.6 kg)       General Appearance:     Alert, cooperative and in no acute distress.   ENT/Mouth: membranes moist, no oral lesions or bleeding gums.      EYES:  no scleral icterus, normal conjunctivae   Neck: no carotid bruits or thyromegaly   Chest/Lungs:   lungs are clear to auscultation, no rales or wheezing, respirations unlabored   Cardiovascular:   Regular. Normal first and second heart sounds with no murmurs, rubs, or gallops; the carotid, radial and posterior tibial pulses are intact, Jugular venous pressure normal, no edema    Abdomen:  Soft, nontender, nondistended, bowel sounds present   Extremities: no cyanosis or clubbing   Skin: warm, dry.    Neurologic: mood and affect are appropriate, alert and oriented x3                                                 Medical History  Surgical History Family History Social History   Past Medical History:   Diagnosis Date   ? (HFpEF) heart failure with preserved ejection fraction 07/15/2016    Elevated filling pressures   ? Gall stones    ? Hemorrhoids    ? History of anesthesia complications     slow to wake up    ? HLD (hyperlipidemia)    ? HTN (hypertension)    ? Hypothyroid    ? IBS (irritable bowel syndrome)    ? Migraine    ? Mitral valve stenosis    ? Nephrolithiasis    ? Osteopenia    ? Paroxysmal atrial fibrillation    ? Sleep apnea     Past Surgical History:   Procedure Laterality Date   ? CHOLECYSTECTOMY     ? MO TOTAL KNEE ARTHROPLASTY Right 3/14/2017    Procedure: RIGHT TOTAL KNEE ARTHROPLASTY;  Surgeon: Peña Chacon MD;  Location: [facility] OR;  Service: Orthopedics   ? TUBAL LIGATION      Family History   Problem Relation Age of Onset   ? Heart disease Mother    ? Hyperlipidemia Mother    ? Breast cancer Mother    ? Heart disease Father    ? Hyperlipidemia Father    ? Cancer Sister    ? Hyperlipidemia Sister    ? Breast cancer Paternal Aunt    ? Lung disease Neg Hx     Social History     Social History   ? Marital status:      Spouse name: N/A   ? Number of children: N/A   ? Years of education: N/A     Occupational History   ? Not on file.     Social History Main Topics   ? Smoking status: Former Smoker     Packs/day: 0.50     Years: 20.00     Types: Cigarettes     Quit date: 1/1/1976   ? Smokeless tobacco: Never Used      Comment: no second hand smoke exposure   ? Alcohol use 0.6 - 1.2 oz/week     1 - 2 Glasses of wine per week   ? Drug use: No   ? Sexual activity: Not on file     Other Topics Concern   ? Not on file     Social History Narrative          Medications  Allergies   Current Outpatient Prescriptions   Medication Sig Dispense Refill   ? acetaminophen (TYLENOL) 325 MG tablet Take 650 mg by mouth every 6 (six) hours as needed for pain.     ? azithromycin  (ZITHROMAX) 500 MG tablet Take 1 tablet 1 hour before dental procedure1 1 tablet 5   ? cholecalciferol, vitamin D3, (VITAMIN D3) 2,000 unit Tab Take 2,000 Units by mouth daily.      ? citalopram (CELEXA) 20 MG tablet Take 1 tablet (20 mg total) by mouth daily. 90 tablet 3   ? ferrous sulfate 325 (65 FE) MG tablet 1 tablet daily.  3   ? furosemide (LASIX) 40 MG tablet Take 40 mg by mouth Daily after lunch.     ? levothyroxine (SYNTHROID, LEVOTHROID) 75 MCG tablet Take 75 mcg by mouth Daily at 6:00 am.      ? pantoprazole (PROTONIX) 40 MG tablet Take 40 mg by mouth daily before breakfast.      ? potassium chloride SA (K-DUR,KLOR-CON) 20 MEQ tablet Take 1 tablet (20 mEq total) by mouth daily. 90 tablet 3   ? rivaroxaban 20 mg Tab Take 1 tablet (20 mg total) by mouth daily with supper. 90 tablet 3   ? simvastatin (ZOCOR) 20 MG tablet Take 1 tablet (20 mg total) by mouth at bedtime. 90 tablet 3   ? sotalol (BETAPACE) 80 MG tablet Take 80 mg by mouth 2 (two) times a day.     ? traMADol (ULTRAM) 50 mg tablet Take 50 mg by mouth every 6 (six) hours as needed for pain.     ? triamcinolone (KENALOG) 0.1 % cream Apply 1 application topically 2 (two) times a day as needed. To dermatitis on back     ? furosemide (LASIX) 40 MG tablet Take 80 mg by mouth every morning.     ? levothyroxine (SYNTHROID, LEVOTHROID) 75 MCG tablet Take 1 tablet (75 mcg total) by mouth daily. 90 tablet 3     Current Facility-Administered Medications   Medication Dose Route Frequency Provider Last Rate Last Dose   ? denosumab 60 mg (PROLIA 60 mg/ml)  60 mg Subcutaneous Q6 Months Brando Rodriguez MD   60 mg at 04/26/18 1033      Allergies   Allergen Reactions   ? Penicillins Hives         Lab Results    Chemistry CBC BNP   Lab Results   Component Value Date    CREATININE 0.79 03/22/2018    BUN 24 03/22/2018     03/22/2018    K 3.7 03/22/2018     03/22/2018    CO2 23 03/22/2018     Creatinine (mg/dL)   Date Value   03/22/2018 0.79   03/21/2018  0.70   02/19/2018 0.76   01/14/2018 0.79    Lab Results   Component Value Date    WBC 6.2 03/21/2018    HGB 11.8 (L) 03/21/2018    HCT 36.9 03/21/2018    MCV 84 03/21/2018     03/21/2018    Lab Results   Component Value Date     (H) 01/14/2018     BNP (pg/mL)   Date Value   01/14/2018 461 (H)   11/30/2017 421 (H)   10/21/2017 653 (H)          20 minutes were spent with the patient with greater than 50% spent on education and counseling.      Priscila Morse, [facility]   Heart Failure Clinic

## 2021-06-26 NOTE — PROGRESS NOTES
Progress Notes by Lory Paulson MD at 8/27/2018 10:10 AM     Author: Lory Paulson MD Service: -- Author Type: Physician    Filed: 8/27/2018 10:52 AM Encounter Date: 8/27/2018 Status: Signed    : Lory Paulson MD (Physician)                  [email or web address]  [phone]         Thank you Dr. Rodriguez for asking the [facility] Heart Care team to participate in the care of your patient, Radha Arnold.     Impression and Plan     1.  Atrial fibrillation. Radha had spontaneously converted after additional diagnoses. She has been maintained on sotalol which has been efficacious in maintaining sinus rhythm. QTc is reasonable on ECG today at 491 ms.  Recommend:    Continuation sotalol 80 mg twice a day    Continue dabigatran etexilate (Pradaxa ) for CVA prophylaxis in the event of recurrence particularly given patient is somewhat unaware of the rhythm disturbance.     2. Hypertension.  Blood pressure is fairly reasonable knee office today at 124/80 mmHg.    Plan follow-up in one year.           History of Present Illness    Once again I would like to thank you again for asking me to participate in the care of your patient, Radha Arnold.  As you know, but to reiterate for my own records, Radha Arnold is a 82 y.o. female who was seen in consultation 16 July 2016 for newly diagnosed atrial fibrillation.      In follow-up today, Radha states that she is doing well from a cardiovascular standpoint.  She reports no subjective palpitations.  She presently states her breathing is comfortable and feels her exercise tolerance is at baseline.  She denies chest pain.     Further review of systems is otherwise negative/noncontributory (medical record and 13 point review of systems reviewed as well and pertinent positives noted).         Cardiac Diagnostics      Echocardiogram 22 March 2018:  1. Normal left ventricular size and systolic performance with ejection fraction of 65-70%.  2. Mild  aortic stenosis.  3. Trace aortic insufficiency.  4. Moderate mitral stenosis with mean gradient of 7 mmHg.  5. Mild concentric increased left ventricular wall thickness.  6. Normal right ventricular size and systolic performance.  7. Moderate left atrial enlargement.  8. Right ventricular systolic pressure relative to right pressure is mildly increased.  Pulmonary artery pressure estimated at 40-45 mmHg plus right atrial pressure.      Echocardiogram 22 October 2017:  1. Normal left ventricular size and systolic performance with ejection fraction of 65%.  2. Mild aortic stenosis.  3. Trace aortic insufficiency.  4. Mild to moderate mitral insufficiency.  5. Normal right ventricular size and systolic performance.  6. Moderate left atrial enlargement.  Mild right atrial enlargement.  7. Assessment of LV Diastolic Function: The cumulative findings suggest impaired diastolic filling [The septal e' velocity is < 7 cm/s & lateral e' velocity is < 10 cm/s. The average E/e' is >14. TR velocity is < 2.8 m/s. Left atrial volume index is greater than 34 mL/m .]  8. Assessment of left atrial pressure (LAP): The cumulative findings suggest moderately elevated left atrial pressure (the E/A is > 0.8 and <2.0 plus 2 or 3 of 3 of the following present: Average E/e > 14, TRvel > 2.8 m/s, and/or LA vol. index > 34 ml/m2 ).    When compared to prior echocardiogram 16 July 2016, no significant change.     Echocardiogram 16 July 2016:  1. Normal left ventricular size and systolic performance with ejection fraction of 60%.  2. Mild aortic insufficiency.  3. Mild to moderate mitral insufficiency.  4. Mild mitral stenosis with mean gradient of 4 mmHg.  5. Normal right ventricular size and systolic performance.  6. Mild left atrial enlargement. Right atrium reported normal.     Nuclear perfusion imaging study 23 February 2018:  1. No evidence of infarct or ischemia.  2. Normal left ventricular systolic performance with ejection fraction of  "63%.    Regadenoson nuclear perfusion study 21 July 2016:  1. No evidence of infarct or ischemia.  2. Normal left ventricular systolic performance with ejection fraction of 70%.     Holter monitor 22 January 2018:  1. Largely normal 24-hour Holter monitor.     12-lead ECG (personally reviewed) 7 March 2017: Sinus rhythm with borderline first-degree AV block.  QTc 483 ms.     12-lead ECG (personally reviewed) 30 November 2016: Sinus rhythm with manually calculated QTc 423 ms.     12-lead ECG (personally reviewed) 20 July 2016: Sinus rhythm with heart rate of 70 bpm.         Physical Examination       /80 (Patient Site: Left Arm, Patient Position: Sitting, Cuff Size: Adult Regular)  Pulse 68  Resp 16  Ht 5' 4\" (1.626 m)  Wt 154 lb (69.9 kg)  BMI 26.43 kg/m2        Wt Readings from Last 3 Encounters:   08/27/18 154 lb (69.9 kg)   06/29/18 153 lb 8 oz (69.6 kg)   06/12/18 152 lb (68.9 kg)     The patient is alert and oriented times three. Sclerae are anicteric. Mucosal membranes are moist. Jugular venous pressure is normal. No significant adenopathy/thyromegally appreciated. Lungs are clear with good expansion. On cardiovascular exam, the patient has a regular S1 and S2. Abdomen is soft and non-tender. Extremities reveal no clubbing, cyanosis, or edema.       Family History/Social History/Risk Factors   Patient does not smoke.  Family history of hypertension.         Medications  Allergies   Current Outpatient Prescriptions   Medication Sig Dispense Refill   ? acetaminophen (TYLENOL) 325 MG tablet Take 650 mg by mouth every 6 (six) hours as needed for pain.     ? cholecalciferol, vitamin D3, (VITAMIN D3) 2,000 unit Tab Take 2,000 Units by mouth daily.      ? citalopram (CELEXA) 20 MG tablet TAKE 1 TABLET(20 MG) BY MOUTH DAILY 90 tablet 1   ? fluticasone-vilanterol (BREO ELLIPTA) 100-25 mcg/dose DsDv inhaler Inhale 1 puff daily. 1 each 11   ? furosemide (LASIX) 40 MG tablet Take 80 mg by mouth every morning.   "   ? levothyroxine (SYNTHROID, LEVOTHROID) 75 MCG tablet Take 1 tablet (75 mcg total) by mouth daily. 90 tablet 3   ? pantoprazole (PROTONIX) 40 MG tablet TAKE 1 TABLET BY MOUTH DAILY 90 tablet 3   ? potassium chloride SA (K-DUR,KLOR-CON) 20 MEQ tablet Take 1 tablet (20 mEq total) by mouth daily. 90 tablet 3   ? rivaroxaban 20 mg Tab Take 1 tablet (20 mg total) by mouth daily with supper. 90 tablet 3   ? simvastatin (ZOCOR) 20 MG tablet Take 1 tablet (20 mg total) by mouth at bedtime. 90 tablet 3   ? sotalol (BETAPACE) 80 MG tablet TAKE 1 TABLET(80 MG) BY MOUTH TWICE DAILY 180 tablet 1   ? ferrous sulfate 325 (65 FE) MG tablet 1 tablet daily.  3   ? furosemide (LASIX) 40 MG tablet Take 40 mg by mouth Daily after lunch.     ? pantoprazole (PROTONIX) 40 MG tablet Take 40 mg by mouth daily before breakfast.        Current Facility-Administered Medications   Medication Dose Route Frequency Provider Last Rate Last Dose   ? denosumab 60 mg (PROLIA 60 mg/ml)  60 mg Subcutaneous Q6 Months Brando Rodriguez MD   60 mg at 04/26/18 1033      Allergies   Allergen Reactions   ? Penicillins Hives          Lab Results   Lab Results   Component Value Date     03/22/2018    K 3.7 03/22/2018     03/22/2018    CO2 23 03/22/2018    BUN 24 03/22/2018    CREATININE 0.79 03/22/2018    CALCIUM 9.0 03/22/2018     Lab Results   Component Value Date    WBC 6.2 03/21/2018    WBC 5.9 04/10/2015    HGB 11.8 (L) 03/21/2018    HCT 36.9 03/21/2018    MCV 84 03/21/2018     03/21/2018     Lab Results   Component Value Date    CHOL 178 07/10/2017    TRIG 130 07/10/2017    HDL 52 07/10/2017    LDLCALC 100 07/10/2017     Lab Results   Component Value Date    INR 1.03 03/21/2018     Lab Results   Component Value Date     (H) 01/14/2018     Lab Results   Component Value Date    TROPONINI 0.01 03/22/2018    TROPONINI <0.01 03/22/2018    TROPONINI <0.01 03/21/2018     Lab Results   Component Value Date    TSH 1.99 07/10/2017

## 2021-06-26 NOTE — PROGRESS NOTES
Progress Notes by Lory Paulson MD at 1/30/2018 12:50 PM     Author: Lory Paulson MD Service: -- Author Type: Physician    Filed: 1/30/2018  1:33 PM Encounter Date: 1/30/2018 Status: Signed    : Lory Paulson MD (Physician)                  [facility] [email or web address]  [phone]         Thank you Dr. Rodriguez for asking the [facility] [place] [facility] team to participate in the care of your patient, Radha Arnold.     Impression and Plan     1.  Atrial fibrillation. Radha had spontaneously converted after additional diagnoses. She has been maintained on sotalol which has been efficacious in maintaining sinus rhythm. QTc is reasonable on ECG today at 4483 ms (manually calculated). For now recommend:    Continuation sotalol 80 mg twice a day    Continue dabigatran etexilate (Pradaxa ) for CVA prophylaxis in the event of recurrence particularly given patient is somewhat unaware of the rhythm disturbance.     2. Hypertension.  Blood pressure is very reasonable knee office today at 112/72mmHg.     3.  Exertional dyspnea.  I suspect this is in part multifactorial in nature.  Certainly, a component of impaired diastolic filling may be a contributor even in the absence of hypervolemia.  Her echocardiographic findings are consistent with impaired diastolic filling and moderate elevation of left atrial pressure (see Cardiac Diagnostics section below).  Under the advisement of my colleague, Dr. Gary Johnson, Radha underwent a Holter monitor to impart screen for possible paroxysms of atrial fibrillation which may be a contributor.  Her Holter monitor was unremarkable.  Patient continues to appear volume neutral on todays examination.  At this time I feel would be reasonable to repeat an ischemic evaluation since it has been approximately 1-1/2 years to exclude this as a possible contributor.  Plan:    Regadenoson nuclear perfusion study.    Depending on regadenoson nuclear perfusion study  findings, may consider recommendations for pulmonary evaluation.         History of Present Illness    Once again I would like to thank you again for asking me to participate in the care of your patient, Radha Arnold.  As you know, but to reiterate for my own records, Radha Arnold is a 82 y.o. female who was seen in consultation 16 July 2016 for newly diagnosed atrial fibrillation.      On interview, patient reports no subjective recurrence of the atrial fibrillation though it is worth noting that the Radha historically has been somewhat asymptomatic in unaware of the rhythm disturbance. Presently, Radha denies any lightheadedness. She continues to have some mild lower extremity edema though this is stable and consistent with dependent edema. She reports no chest pain symptoms. Patient reports only occasional palpitations, but no sustained rapid irregular heart rates that she can appreciate.    Patient still reports subjective dyspnea though this does seem somewhat episodic.  When she was last evaluated in the emergency department for these symptoms, by exam, chest radiograph, and other objective markers she was felt to be volume neutral.    Further review of systems is otherwise negative/noncontributory (medical record and 13 point review of systems reviewed as well and pertinent positives noted).         Cardiac Diagnostics      Echocardiogram 22 October 2017:  1. Normal left ventricular size and systolic performance with ejection fraction of 65%.  2. Mild aortic stenosis.  3. Trace aortic insufficiency.  4. Mild to moderate mitral insufficiency.  5. Normal right ventricular size and systolic performance.  6. Moderate left atrial enlargement.  Mild right atrial enlargement.  7. Assessment of LV Diastolic Function: The cumulative findings suggest impaired diastolic filling [The septal e' velocity is < 7 cm/s & lateral e' velocity is < 10 cm/s. The average E/e' is >14. TR velocity is < 2.8 m/s. Left atrial volume  "index is greater than 34 mL/m .]  8. Assessment of left atrial pressure (LAP): The cumulative findings suggest moderately elevated left atrial pressure (the E/A is > 0.8 and <2.0 plus 2 or 3 of 3 of the following present: Average E/e > 14, TRvel > 2.8 m/s, and/or LA vol. index > 34 ml/m2 ).  9. When compared to prior echocardiogram 16 July 2016, no significant change.     Echocardiogram 16 July 2016:  1. Normal left ventricular size and systolic performance with ejection fraction of 60%.  2. Mild aortic insufficiency.  3. Mild to moderate mitral insufficiency.  4. Mild mitral stenosis with mean gradient of 4 mmHg.  5. Normal right ventricular size and systolic performance.  6. Mild left atrial enlargement. Right atrium reported normal.    Regadenoson nuclear perfusion study 21 July 2016:  1. No evidence of infarct or ischemia.  2. Normal left ventricular systolic performance with ejection fraction of 70%.    Holter monitor 22 January 2018:  1. Largely normal 24-hour Holter monitor.    12-lead ECG (personally reviewed) 7 March 2017: Sinus rhythm with borderline first-degree AV block.  QTc 483 ms.     12-lead ECG (personally reviewed) 30 November 2016: Sinus rhythm with manually calculated QTc 423 ms.     12-lead ECG (personally reviewed) 20 July 2016: Sinus rhythm with heart rate of 70 bpm.         Physical Examination       /72 (Patient Site: Right Arm, Patient Position: Sitting, Cuff Size: Adult Regular)  Pulse 86  Resp 20  Ht 5' 4\" (1.626 m)  Wt 151 lb (68.5 kg)  BMI 25.92 kg/m2        Wt Readings from Last 3 Encounters:   01/30/18 151 lb (68.5 kg)   01/17/18 148 lb (67.1 kg)   01/15/18 150 lb (68 kg)     The patient is alert and oriented times three. Sclerae are anicteric. Mucosal membranes are moist. Jugular venous pressure is normal. No significant adenopathy/thyromegally appreciated. Lungs are clear with good expansion. On cardiovascular exam, the patient has a regular S1 and S2. Abdomen is soft and " non-tender. Extremities reveal no clubbing, cyanosis, or edema.         Family History/Social History/Risk Factors   Patient does not smoke.  Family history of hypertension.         Medications  Allergies   Current Outpatient Prescriptions   Medication Sig Dispense Refill   ? acetaminophen (TYLENOL) 325 MG tablet Take 650 mg by mouth every 6 (six) hours as needed for pain.     ? cholecalciferol, vitamin D3, (VITAMIN D3) 2,000 unit Tab Take 2,000 Units by mouth daily.      ? citalopram (CELEXA) 20 MG tablet Take 1 tablet (20 mg total) by mouth daily. 90 tablet 3   ? ferrous sulfate 325 (65 FE) MG tablet Take 1 tablet (325 mg total) by mouth daily with breakfast. 30 tablet 3   ? furosemide (LASIX) 40 MG tablet Two tabs in AM. One in afternoon 270 tablet 3   ? levothyroxine (SYNTHROID, LEVOTHROID) 75 MCG tablet Take 75 mcg by mouth daily.     ? pantoprazole (PROTONIX) 40 MG tablet Take 20 mg by mouth daily.      ? potassium chloride SA (K-DUR,KLOR-CON) 20 MEQ tablet Take 1 tablet (20 mEq total) by mouth daily. 90 tablet 3   ? rivaroxaban 20 mg Tab Take 20 mg by mouth daily with supper.     ? simvastatin (ZOCOR) 20 MG tablet Take 20 mg by mouth daily.     ? sotalol (BETAPACE) 80 MG tablet TAKE 1 TABLET(80 MG) BY MOUTH TWICE DAILY 180 tablet 1   ? traMADol (ULTRAM) 50 mg tablet TAKE 1 TABLET(50 MG) BY MOUTH EVERY 6 HOURS AS NEEDED FOR PAIN 40 tablet 1   ? azithromycin (ZITHROMAX) 500 MG tablet Take 1 tablet 1 hour before dental procedure1 1 tablet 5     No current facility-administered medications for this visit.       Allergies   Allergen Reactions   ? Penicillins Hives          Lab Results   Lab Results   Component Value Date     01/14/2018    K 3.9 01/14/2018     01/14/2018    CO2 26 01/14/2018    BUN 19 01/14/2018    CREATININE 0.79 01/14/2018    CALCIUM 9.3 01/14/2018     Lab Results   Component Value Date    WBC 5.4 01/14/2018    WBC 5.9 04/10/2015    HGB 10.1 (L) 01/14/2018    HCT 32.8 (L) 01/14/2018     MCV 81 01/14/2018     01/14/2018     Lab Results   Component Value Date    CHOL 178 07/10/2017    TRIG 130 07/10/2017    HDL 52 07/10/2017    LDLCALC 100 07/10/2017     Lab Results   Component Value Date    INR 1.51 (H) 10/21/2017     Lab Results   Component Value Date     (H) 01/14/2018     Lab Results   Component Value Date    TROPONINI <0.01 01/14/2018    TROPONINI 0.01 01/14/2018    TROPONINI 0.01 10/21/2017     Lab Results   Component Value Date    TSH 1.99 07/10/2017

## 2021-06-27 ENCOUNTER — HEALTH MAINTENANCE LETTER (OUTPATIENT)
Age: 86
End: 2021-06-27

## 2021-06-28 NOTE — PROGRESS NOTES
Progress Notes by Lory Paulson MD at 1/3/2020  1:10 PM     Author: Lory Paulson MD Service: -- Author Type: Physician    Filed: 1/3/2020  1:29 PM Encounter Date: 1/3/2020 Status: Signed    : Lory Paulson MD (Physician)                                       Thank you Dr. Rodriguez for asking the [facility] team to participate in the care of your patient, Radha Arnold.     Impression and Plan     1.  Atrial fibrillation. Radha had spontaneously converted after additional diagnoses. She has been maintained on sotalol which has been efficacious in maintaining sinus rhythm. Recommend:    Continue sotalol 80 mg twice a day    Continue rivaroxaban for CVA prophylaxis in the event of recurrence particularly given patient is somewhat unaware of the rhythm disturbance.     2.  Valvular heart disease.  Recent echocardiogram revealed mild aortic stenosis with mean gradient of 9 mmHg as well as moderate mitral insufficiency with mean gradient of 6 mmHg.  More advanced valvular disease is not suggested on exam or clinically.  This will require continued intermittent monitoring.    3.  Hypertension.  Blood pressure is very reasonable in the office today.     Plan follow-up in one year.          History of Present Illness    Once again I would like to thank you again for asking me to participate in the care of your patient, Radha Arnold.  As you know, but to reiterate for my own records, Radha Arnold is a 84 y.o. female who was seen in consultation 16 July 2016 for newly diagnosed atrial fibrillation.      In follow-up today, Radha states that she is doing well from a cardiovascular standpoint.  She reports no subjective palpitations.  She presently states her breathing is comfortable and feels her exercise tolerance is at baseline.  She denies chest pain.     Further review of systems is otherwise negative/noncontributory (medical record and 13 point review of systems reviewed as  well and pertinent positives noted).         Cardiac Diagnostics      Echocardiogram 29 December 2019:  1. Normal left ventricular size and systolic performance with ejection fraction of 60 to 65%.  2. Mild aortic stenosis.    Mean gradient 9 mmHg.  3. Moderate mitral stenosis.    Mean gradient measured at 6 mmHg.  Mitral valve area by pressure half-time formula 1.59 cm .  4. Mild to moderate mitral insufficiency.  5. Normal right ventricular size and systolic performance.  6. Severe left atrial enlargement.  Moderate right atrial enlargement.     Echocardiogram 22 March 2018:  1. Normal left ventricular size and systolic performance with ejection fraction of 65-70%.  2. Mild aortic stenosis.  3. Trace aortic insufficiency.  4. Moderate mitral stenosis with mean gradient of 7 mmHg.  5. Mild concentric increased left ventricular wall thickness.  6. Normal right ventricular size and systolic performance.  7. Moderate left atrial enlargement.  8. Right ventricular systolic pressure relative to right pressure is mildly increased.  Pulmonary artery pressure estimated at 40-45 mmHg plus right atrial pressure.    Echocardiogram 22 October 2017:  1. Normal left ventricular size and systolic performance with ejection fraction of 65%.  2. Mild aortic stenosis.  3. Trace aortic insufficiency.  4. Mild to moderate mitral insufficiency.  5. Normal right ventricular size and systolic performance.  6. Moderate left atrial enlargement.  Mild right atrial enlargement.  7. Assessment of LV Diastolic Function: The cumulative findings suggest impaired diastolic filling [The septal e' velocity is < 7 cm/s & lateral e' velocity is < 10 cm/s. The average E/e' is >14. TR velocity is < 2.8 m/s. Left atrial volume index is greater than 34 mL/m .]  8. Assessment of left atrial pressure (LAP): The cumulative findings suggest moderately elevated left atrial pressure (the E/A is > 0.8 and <2.0 plus 2 or 3 of 3 of the following present: Average  "E/e > 14, TRvel > 2.8 m/s, and/or LA vol. index > 34 ml/m2 ).  9. When compared to prior echocardiogram 16 July 2016, no significant change.    Echocardiogram 16 July 2016:  1. Normal left ventricular size and systolic performance with ejection fraction of 60%.  2. Mild aortic insufficiency.  3. Mild to moderate mitral insufficiency.  4. Mild mitral stenosis with mean gradient of 4 mmHg.  5. Normal right ventricular size and systolic performance.  6. Mild left atrial enlargement. Right atrium reported normal.    Nuclear perfusion imaging study 23 February 2018:  1. No evidence of infarct or ischemia.  2. Normal left ventricular systolic performance with ejection fraction of 63%.    Regadenoson nuclear perfusion study 21 July 2016:  1. No evidence of infarct or ischemia.  2. Normal left ventricular systolic performance with ejection fraction of 70%.    Holter monitor 22 January 2018:  1. Largely normal 24-hour Holter monitor.    12-lead ECG (personally reviewed) 7 March 2017: Sinus rhythm with borderline first-degree AV block.  QTc 483 ms.     12-lead ECG (personally reviewed) 30 November 2016: Sinus rhythm with manually calculated QTc 423 ms.     12-lead ECG (personally reviewed) 20 July 2016: Sinus rhythm with heart rate of 70 bpm.            Physical Examination       BP 90/70 (Patient Site: Left Arm, Patient Position: Sitting, Cuff Size: Adult Regular)   Pulse 74   Resp 16   Ht 5' 4\" (1.626 m)   Wt 155 lb (70.3 kg)   BMI 26.61 kg/m          Wt Readings from Last 3 Encounters:   01/03/20 155 lb (70.3 kg)   12/31/19 153 lb (69.4 kg)   12/29/19 152 lb 6.4 oz (69.1 kg)       The patient is alert and oriented times three. Sclerae are anicteric. Mucosal membranes are moist. Jugular venous pressure is normal. No significant adenopathy/thyromegally appreciated. Lungs are clear with good expansion. On cardiovascular exam, the patient has a regular S1 and S2.  There is a 2/6 systolic murmur heard at the right sternal " border.  Abdomen is soft and non-tender. Extremities reveal no clubbing, cyanosis, or edema.       Family History/Social History/Risk Factors   Patient does not smoke.  Family history reviewed, and family history includes Breast cancer in her mother and paternal aunt; Cancer in her sister; Heart disease in her father and mother; Hyperlipidemia in her father, mother, and sister.          Medications  Allergies   Current Outpatient Medications   Medication Sig Dispense Refill   ? acetaminophen (TYLENOL) 325 MG tablet Take 650 mg by mouth every 6 (six) hours as needed for pain.     ? cholecalciferol, vitamin D3, (VITAMIN D3) 2,000 unit Tab Take 2,000 Units by mouth daily.      ? citalopram (CELEXA) 20 MG tablet TAKE 1 TABLET(20 MG) BY MOUTH DAILY 90 tablet 3   ? fluticasone furoate-vilanterol (BREO ELLIPTA) 100-25 mcg/dose DsDv inhaler Inhale 1 Dose daily.     ? furosemide (LASIX) 40 MG tablet Take 80 mg by mouth every morning.     ? latanoprost (XALATAN) 0.005 % ophthalmic solution Administer 1 drop to both eyes at bedtime.   12   ? levothyroxine (SYNTHROID, LEVOTHROID) 75 MCG tablet TAKE 1 TABLET(75 MCG) BY MOUTH DAILY 90 tablet 3   ? pantoprazole (PROTONIX) 40 MG tablet Take 1 tablet (40 mg total) by mouth daily. 90 tablet 0   ? potassium chloride (K-DUR,KLOR-CON) 20 MEQ tablet Take 1 tablet (20 mEq total) by mouth daily. 90 tablet 3   ? rivaroxaban 20 mg Tab Take 1 tablet (20 mg total) by mouth daily with supper. 90 tablet 3   ? simvastatin (ZOCOR) 20 MG tablet TAKE 1 TABLET(20 MG) BY MOUTH AT BEDTIME 90 tablet 3   ? sotalol (BETAPACE) 80 MG tablet Take 1 tablet (80 mg total) by mouth 2 (two) times a day. 180 tablet 3   ? triamcinolone (KENALOG) 0.1 % cream Apply 1 application topically 2 (two) times a day as needed (Rash).       No current facility-administered medications for this visit.       Allergies   Allergen Reactions   ? Penicillins Hives          Lab Results   Lab Results   Component Value Date      12/28/2019    K 4.3 12/28/2019     12/28/2019    CO2 24 12/28/2019    BUN 15 12/28/2019    CREATININE 0.80 12/28/2019    CALCIUM 8.5 12/28/2019     Lab Results   Component Value Date    WBC 7.8 12/28/2019    WBC 5.9 04/10/2015    HGB 13.4 12/28/2019    HCT 42.5 12/28/2019    MCV 93 12/28/2019     12/28/2019     Lab Results   Component Value Date    CHOL 173 04/29/2019    TRIG 192 (H) 04/29/2019    HDL 47 (L) 04/29/2019    LDLCALC 88 04/29/2019     Lab Results   Component Value Date    INR 1.03 03/21/2018     Lab Results   Component Value Date     (H) 01/14/2018     Lab Results   Component Value Date    TROPONINI <0.01 12/29/2019    TROPONINI <0.01 12/28/2019    TROPONINI <0.01 12/28/2019     Lab Results   Component Value Date    TSH 1.94 04/29/2019

## 2021-06-30 NOTE — PROGRESS NOTES
Progress Notes by Lory Paulson MD at 1/22/2021 11:30 AM     Author: Lory Paulson MD Service: -- Author Type: Physician    Filed: 1/22/2021  1:01 PM Encounter Date: 1/22/2021 Status: Signed    : Lory Paulson MD (Physician)                                       Thank you Dr. Rodriguez for asking the [facility] team to participate in the care of your patient, Radha Arnold.     Impression and Plan     1.  Atrial fibrillation. Radha had spontaneously converted after additional diagnoses. She has been maintained on sotalol which has been efficacious in maintaining sinus rhythm.  QTC on twelve-lead ECG today is reasonable.  Recommend:    Continue sotalol 80 mg twice a day    Continue rivaroxaban for CVA prophylaxis in the event of recurrence particularly given patient is somewhat unaware of the rhythm disturbance.     2.  Valvular heart disease.  Recent echocardiogram revealed mild aortic stenosis with mean gradient of 9 mmHg as well as moderate mitral insufficiency with mean gradient of 6 mmHg.  More advanced valvular disease is not suggested on exam or clinically.  This will require continued intermittent monitoring.    Plan on echocardiogram in 1 year from now.    3.  Hypertension.  Blood pressure is fairly reasonable in the office today at 132/78 mmHg.    4.  Thoracic aortic root enlargement.  CT scan 11 January 2021 revealed mild enlargement of the ascending aorta measuring 4.3 cm.  This is unchanged from previous.     Plan follow-up in one year.           History of Present Illness    Once again I would like to thank you again for asking me to participate in the care of your patient, Radha Arnold.  As you know, but to reiterate for my own records, Radha Arnold is a 85 y.o. female who was seen in consultation 16 July 2016 for newly diagnosed atrial fibrillation.      In follow-up today, Radha states that she is doing well from a cardiovascular standpoint.  She reports  no subjective palpitations.  She presently states her breathing is comfortable and feels her exercise tolerance is at baseline.  She denies chest pain.   She denies any fevers, chills, or other constitutional symptoms.    Further review of systems is otherwise negative/noncontributory (medical record and 13 point review of systems reviewed as well and pertinent positives noted).         Cardiac Diagnostics      Echocardiogram 29 December 2019:  1. Normal left ventricular size and systolic performance with ejection fraction of 60 to 65%.  2. Mild aortic stenosis.  o Mean gradient 9 mmHg.  3. Moderate mitral stenosis.  o Mean gradient measured at 6 mmHg.  Mitral valve area by pressure half-time formula 1.59 cm .  4. Mild to moderate mitral insufficiency.  5. Normal right ventricular size and systolic performance.  6. Severe left atrial enlargement.  Moderate right atrial enlargement.    Echocardiogram 22 March 2018:  1. Normal left ventricular size and systolic performance with ejection fraction of 65-70%.  2. Mild aortic stenosis.  3. Trace aortic insufficiency.  4. Moderate mitral stenosis with mean gradient of 7 mmHg.  5. Mild concentric increased left ventricular wall thickness.  6. Normal right ventricular size and systolic performance.  7. Moderate left atrial enlargement.  8. Right ventricular systolic pressure relative to right pressure is mildly increased.  Pulmonary artery pressure estimated at 40-45 mmHg plus right atrial pressure.    Echocardiogram 22 October 2017:  1. Normal left ventricular size and systolic performance with ejection fraction of 65%.  2. Mild aortic stenosis.  3. Trace aortic insufficiency.  4. Mild to moderate mitral insufficiency.  5. Normal right ventricular size and systolic performance.  6. Moderate left atrial enlargement.  Mild right atrial enlargement.  7. Assessment of LV Diastolic Function: The cumulative findings suggest impaired diastolic filling [The septal e' velocity is <  "7 cm/s & lateral e' velocity is < 10 cm/s. The average E/e' is >14. TR velocity is < 2.8 m/s. Left atrial volume index is greater than 34 mL/m .]  8. Assessment of left atrial pressure (LAP): The cumulative findings suggest moderately elevated left atrial pressure (the E/A is > 0.8 and <2.0 plus 2 or 3 of 3 of the following present: Average E/e > 14, TRvel > 2.8 m/s, and/or LA vol. index > 34 ml/m2 ).  o When compared to prior echocardiogram 16 July 2016, no significant change.    Echocardiogram 16 July 2016:  1. Normal left ventricular size and systolic performance with ejection fraction of 60%.  2. Mild aortic insufficiency.  3. Mild to moderate mitral insufficiency.  4. Mild mitral stenosis with mean gradient of 4 mmHg.  5. Normal right ventricular size and systolic performance.  6. Mild left atrial enlargement. Right atrium reported normal.    Nuclear perfusion imaging study 23 February 2018:  1. No evidence of infarct or ischemia.  2. Normal left ventricular systolic performance with ejection fraction of 63%.    Regadenoson nuclear perfusion study 21 July 2016:  1. No evidence of infarct or ischemia.  2. Normal left ventricular systolic performance with ejection fraction of 70%.    Holter monitor 22 January 2018:  1. Largely normal 24-hour Holter monitor.    12-lead ECG (personally reviewed) 22 January 2021: Sinus rhythm.  QTc 490 ms.    12-lead ECG (personally reviewed) 7 March 2017: Sinus rhythm with borderline first-degree AV block.  QTc 483 ms.     12-lead ECG (personally reviewed) 30 November 2016: Sinus rhythm with manually calculated QTc 423 ms.     12-lead ECG (personally reviewed) 20 July 2016: Sinus rhythm with heart rate of 70 bpm.            Physical Examination       /78 (Patient Site: Left Arm, Patient Position: Sitting, Cuff Size: Adult Regular)   Pulse 75   Resp 16   Ht 5' 4\" (1.626 m)   Wt 161 lb (73 kg)   SpO2 94%   BMI 27.64 kg/m          Wt Readings from Last 3 Encounters: "   01/22/21 161 lb (73 kg)   11/01/20 155 lb (70.3 kg)   01/03/20 155 lb (70.3 kg)     The patient is alert and oriented times three. Sclerae are anicteric. Mucosal membranes are moist. Jugular venous pressure is normal. No significant adenopathy/thyromegally appreciated. Lungs are clear with good expansion. On cardiovascular exam, the patient has a regular S1 and S2. Abdomen is soft and non-tender. Extremities reveal no clubbing, cyanosis, or edema.         Imaging     CT scan of chest 11 January 2021:  1. Unchanged size of the medial apical soft tissue and architectural distortion.  2. Mild mosaic attenuation of the lungs is noted. However, air trapping was reportedly not noted on PFTs.   3. 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 Consider connective tissue disease related ILD, chronic aspiration, and other processes. Given the mild mosaic attenuation of the lungs, however, hypersensitivity pneumonitis is not excluded.  4. Enlarged ascending aorta measuring 4.3 cm in diameter. No change from the comparison study.  5. Enlarged main pulmonary artery, which can be seen with pulmonary hypertension.           Family History/Social History/Risk Factors   Patient does not smoke.  Family history reviewed, and family history includes Breast cancer in her mother and paternal aunt; Cancer in her sister; Heart disease in her father and mother; Hyperlipidemia in her father, mother, and sister.          Medications  Allergies   Current Outpatient Medications   Medication Sig Dispense Refill   ? acetaminophen (TYLENOL) 325 MG tablet Take 650 mg by mouth every 6 (six) hours as needed for pain.     ? citalopram (CELEXA) 20 MG tablet TAKE 1 TABLET(20 MG) BY MOUTH DAILY 90 tablet 2   ? denosumab 60 mg/mL Syrg Inject 60 mg under the skin see administration instructions.     ? fluticasone furoate-vilanteroL (BREO ELLIPTA) 100-25 mcg/dose inhaler Inhale 1 puff daily. OFFICE VISIT NEEDED FOR ANY FURTHER REFILLS 60 each 0   ?  furosemide (LASIX) 40 MG tablet Take 2 tablets (80 mg total) by mouth daily. 180 tablet 2   ? latanoprost (XALATAN) 0.005 % ophthalmic solution Administer 1 drop to both eyes at bedtime.   12   ? levothyroxine (SYNTHROID, LEVOTHROID) 75 MCG tablet TAKE 1 TABLET(75 MCG) BY MOUTH DAILY 90 tablet 3   ? pantoprazole (PROTONIX) 40 MG tablet TAKE 1 TABLET(40 MG) BY MOUTH DAILY 90 tablet 3   ? potassium chloride (K-DUR,KLOR-CON) 20 MEQ tablet Take 1 tablet (20 mEq total) by mouth daily. 90 tablet 3   ? simvastatin (ZOCOR) 20 MG tablet TAKE 1 TABLET(20 MG) BY MOUTH AT BEDTIME 90 tablet 2   ? sotaloL (BETAPACE) 80 MG tablet Take 1 tablet (80 mg total) by mouth 2 (two) times a day. 180 tablet 0   ? XARELTO 20 mg tablet TAKE 1 TABLET BY MOUTH EVERY DAY WITH SUPPER 90 tablet 1   ? cholecalciferol, vitamin D3, (VITAMIN D3) 2,000 unit Tab Take 2,000 Units by mouth daily.      ? triamcinolone (KENALOG) 0.1 % cream Apply 1 application topically 2 (two) times a day as needed (Rash).       No current facility-administered medications for this visit.       Allergies   Allergen Reactions   ? Penicillins Hives          Lab Results   Lab Results   Component Value Date     12/28/2019    K 4.3 12/28/2019     12/28/2019    CO2 24 12/28/2019    BUN 15 12/28/2019    CREATININE 0.80 12/28/2019    CALCIUM 8.5 12/28/2019     Lab Results   Component Value Date    WBC 7.0 11/01/2020    WBC 5.9 04/10/2015    HGB 11.3 (L) 11/01/2020    HCT 35.7 11/01/2020    MCV 94 11/01/2020     11/01/2020     Lab Results   Component Value Date    CHOL 173 04/29/2019    TRIG 192 (H) 04/29/2019    HDL 47 (L) 04/29/2019    LDLCALC 88 04/29/2019     Lab Results   Component Value Date    INR 1.03 03/21/2018     Lab Results   Component Value Date     (H) 01/14/2018     Lab Results   Component Value Date    TROPONINI <0.01 12/29/2019    TROPONINI <0.01 12/28/2019    TROPONINI <0.01 12/28/2019     Lab Results   Component Value Date    TSH 1.94  04/29/2019

## 2021-07-03 NOTE — ADDENDUM NOTE
Addendum Note by Perla Silva, RN at 2/9/2018  2:41 PM     Author: Perla Silva RN Service: -- Author Type: Registered Nurse    Filed: 2/9/2018  2:41 PM Encounter Date: 1/26/2018 Status: Signed    : Perla Silva RN (Registered Nurse)    Addended by: PERLA SILVA on: 2/9/2018 02:41 PM        Modules accepted: Orders

## 2021-07-03 NOTE — ADDENDUM NOTE
Addendum Note by Priscila Morse CNP at 11/22/2017  9:54 AM     Author: Priscila Morse CNP Service: -- Author Type: Nurse Practitioner    Filed: 11/22/2017  9:54 AM Encounter Date: 11/22/2017 Status: Signed    : Priscila Morse CNP (Nurse Practitioner)    Addended by: PRISCILA MORSE on: 11/22/2017 09:54 AM        Modules accepted: Orders

## 2021-07-14 PROBLEM — I50.33 ACUTE ON CHRONIC DIASTOLIC CONGESTIVE HEART FAILURE (H): Status: RESOLVED | Noted: 2017-10-21 | Resolved: 2017-11-02

## 2021-08-31 DIAGNOSIS — I50.9 CHF (CONGESTIVE HEART FAILURE) (H): ICD-10-CM

## 2021-08-31 RX ORDER — FUROSEMIDE 40 MG
80 TABLET ORAL DAILY
Qty: 180 TABLET | Refills: 1 | Status: SHIPPED | OUTPATIENT
Start: 2021-08-31 | End: 2022-01-01

## 2021-10-16 ENCOUNTER — HEALTH MAINTENANCE LETTER (OUTPATIENT)
Age: 86
End: 2021-10-16

## 2022-01-01 DIAGNOSIS — I48.0 PAROXYSMAL ATRIAL FIBRILLATION (H): ICD-10-CM

## 2022-01-01 DIAGNOSIS — I50.9 CHF (CONGESTIVE HEART FAILURE) (H): ICD-10-CM

## 2022-01-01 DIAGNOSIS — K21.9 ESOPHAGEAL REFLUX: ICD-10-CM

## 2022-01-01 RX ORDER — FUROSEMIDE 40 MG
TABLET ORAL
Qty: 180 TABLET | Refills: 1 | Status: SHIPPED | OUTPATIENT
Start: 2022-01-01 | End: 2023-01-01

## 2022-01-01 RX ORDER — PANTOPRAZOLE SODIUM 40 MG/1
TABLET, DELAYED RELEASE ORAL
Qty: 90 TABLET | Refills: 3 | OUTPATIENT
Start: 2022-01-01

## 2022-01-01 RX ORDER — RIVAROXABAN 20 MG/1
TABLET, FILM COATED ORAL
Qty: 90 TABLET | Refills: 1 | Status: SHIPPED | OUTPATIENT
Start: 2022-01-01

## 2022-04-18 ENCOUNTER — HOSPITAL ENCOUNTER (OUTPATIENT)
Dept: CARDIOLOGY | Facility: HOSPITAL | Age: 87
Discharge: HOME | End: 2022-04-18
Attending: INTERNAL MEDICINE | Admitting: INTERNAL MEDICINE
Payer: MEDICARE

## 2022-04-18 DIAGNOSIS — I35.0 NONRHEUMATIC AORTIC VALVE STENOSIS: ICD-10-CM

## 2022-04-18 DIAGNOSIS — I48.0 PAROXYSMAL ATRIAL FIBRILLATION (H): ICD-10-CM

## 2022-04-18 DIAGNOSIS — I05.0 MITRAL VALVE STENOSIS, UNSPECIFIED ETIOLOGY: ICD-10-CM

## 2022-04-18 LAB — LVEF ECHO: NORMAL

## 2022-04-18 PROCEDURE — 93306 TTE W/DOPPLER COMPLETE: CPT | Mod: 26 | Performed by: INTERNAL MEDICINE

## 2022-04-18 PROCEDURE — 93306 TTE W/DOPPLER COMPLETE: CPT

## 2022-04-19 DIAGNOSIS — I48.0 PAROXYSMAL ATRIAL FIBRILLATION (H): ICD-10-CM

## 2022-06-29 ENCOUNTER — OFFICE VISIT (OUTPATIENT)
Dept: CARDIOLOGY | Facility: CLINIC | Age: 87
End: 2022-06-29
Payer: MEDICARE

## 2022-06-29 VITALS
BODY MASS INDEX: 26.61 KG/M2 | DIASTOLIC BLOOD PRESSURE: 64 MMHG | HEART RATE: 72 BPM | SYSTOLIC BLOOD PRESSURE: 110 MMHG | WEIGHT: 155 LBS | RESPIRATION RATE: 18 BRPM

## 2022-06-29 DIAGNOSIS — I05.0 MITRAL VALVE STENOSIS, UNSPECIFIED ETIOLOGY: ICD-10-CM

## 2022-06-29 DIAGNOSIS — I34.0 MITRAL VALVE INSUFFICIENCY, UNSPECIFIED ETIOLOGY: ICD-10-CM

## 2022-06-29 DIAGNOSIS — I10 HYPERTENSION, UNSPECIFIED TYPE: ICD-10-CM

## 2022-06-29 DIAGNOSIS — I77.89 AORTIC ROOT ENLARGEMENT (H): ICD-10-CM

## 2022-06-29 DIAGNOSIS — I48.0 PAROXYSMAL ATRIAL FIBRILLATION (H): Primary | ICD-10-CM

## 2022-06-29 DIAGNOSIS — I07.1 TRICUSPID VALVE INSUFFICIENCY, UNSPECIFIED ETIOLOGY: ICD-10-CM

## 2022-06-29 LAB
ATRIAL RATE - MUSE: 73 BPM
DIASTOLIC BLOOD PRESSURE - MUSE: NORMAL MMHG
INTERPRETATION ECG - MUSE: NORMAL
P AXIS - MUSE: 32 DEGREES
PR INTERVAL - MUSE: 212 MS
QRS DURATION - MUSE: 90 MS
QT - MUSE: 444 MS
QTC - MUSE: 489 MS
R AXIS - MUSE: 17 DEGREES
SYSTOLIC BLOOD PRESSURE - MUSE: NORMAL MMHG
T AXIS - MUSE: 1 DEGREES
VENTRICULAR RATE- MUSE: 73 BPM

## 2022-06-29 PROCEDURE — 99214 OFFICE O/P EST MOD 30 MIN: CPT | Performed by: INTERNAL MEDICINE

## 2022-06-29 PROCEDURE — 93000 ELECTROCARDIOGRAM COMPLETE: CPT | Mod: RTG | Performed by: INTERNAL MEDICINE

## 2022-06-29 NOTE — PROGRESS NOTES
[facility] [address], [place] [address]   [email or web address]   OFFICE: [phone]          Thank you Felicitas Garcia for asking the [facility] team to participate in the care of your patient, Radha Arnold.     Impression and Plan     1.  Atrial fibrillation. Radha had spontaneously converted after additional diagnoses. She has been maintained on sotalol which has been efficacious in maintaining sinus rhythm.  QTC on twelve-lead ECG today is reasonable.  Recommend:    Continue sotalol 80 mg twice a day    Continue rivaroxaban for CVA prophylaxis in the event of recurrence particularly given patient is somewhat unaware of the rhythm disturbance.     2.  Valvular heart disease.  Echocardiogram 18 April 2022 revealed moderate mitral stenosis and moderate mitral insufficiency.  There is moderate to severe tricuspid insufficiency.  This would appear to be stable.    3.  Hypertension.  Blood pressure is reasonable in the office today at 110/64 mmHg.     4.  Thoracic aortic root enlargement.  CT scan 11 January 2021 revealed only mild enlargement of the ascending aorta measuring 4.3 cm.  This is unchanged from previous.     Plan follow-up in one year.    35 minutes spent reviewing prior records (including documentation, laboratory studies, cardiac testing/imaging), interview with patient along with physical exam, planning, and subsequent documentation/crafting of note).           History of Present Illness    Once again I would like to thank you again for asking me to participate in the care of your patient, Radha Arnold.  As you know, but to reiterate for my own records, Radha Arnold is a 86 year old female who was seen in consultation 16 July 2016 for newly diagnosed atrial fibrillation.      In follow-up today, Radha states that she is doing well from a cardiovascular standpoint.  She reports no subjective palpitations.  She presently  states her breathing somewhat short with certain activities though feels her exercise tolerance is at baseline.  She denies chest pain.   She denies any fevers, chills, or other constitutional symptoms.    Further review of systems is otherwise negative/noncontributory (medical record and 13 point review of systems reviewed as well and pertinent positives noted).         Cardiac Diagnostics      Echocardiogram 18 April 2022:  1. Left ventricular size, wall motion and function are normal. The ejection fraction is 60-65%.  2. There is mild to moderate concentric left ventricular hypertrophy.  3. Normal right ventricle size and systolic function.  4. The mitral valve leaflets appear thickened, but open well. There is moderate mitral stenosis. Mean gradient is 6 mmHg.  5. There is moderate (2+) mitral regurgitation. At heart rate of 74 bpm ERO is 0.09 cmÂ  with regurgitant volume of 15 cc, suspect this is an underestimation  6. There is moderately severe (3+) tricuspid regurgitation.  7. Right ventricular systolic pressure is elevated, consistent with moderate to severe pulmonary hypertension.  Pulmonary artery pressure is estimated 78 mmHg plus right atrial pressure.  8. A patent foramen ovale is present. Left to right flow seen.  9. The ascending aorta is Moderately dilated.    Compared to the prior study dated 29 December 2019, the tricuspid regurgitation is worse, pulmonic pressures are higher, PFO is now documented.    Echocardiogram 29 December 2019:  1. Normal left ventricular size and systolic performance with ejection fraction of 60 to 65%.  2. Mild aortic stenosis.    Mean gradient 9 mmHg.  3. Moderate mitral stenosis.    Mean gradient measured at 6 mmHg.  Mitral valve area by pressure half-time formula 1.59 cm .  4. Mild to moderate mitral insufficiency.  5. Normal right ventricular size and systolic performance.  6. Severe left atrial enlargement.  Moderate right atrial enlargement.    Echocardiogram 22  March 2018:  1. Normal left ventricular size and systolic performance with ejection fraction of 65-70%.  2. Mild aortic stenosis.  3. Trace aortic insufficiency.  4. Moderate mitral stenosis with mean gradient of 7 mmHg.  5. Mild concentric increased left ventricular wall thickness.  6. Normal right ventricular size and systolic performance.  7. Moderate left atrial enlargement.  8. Right ventricular systolic pressure relative to right pressure is mildly increased.  Pulmonary artery pressure estimated at 40-45 mmHg plus right atrial pressure.    Echocardiogram 22 October 2017:  1. Normal left ventricular size and systolic performance with ejection fraction of 65%.  2. Mild aortic stenosis.  3. Trace aortic insufficiency.  4. Mild to moderate mitral insufficiency.  5. Normal right ventricular size and systolic performance.  6. Moderate left atrial enlargement.  Mild right atrial enlargement.  7. Assessment of LV Diastolic Function: The cumulative findings suggest impaired diastolic filling [The septal e' velocity is < 7 cm/s & lateral e' velocity is < 10 cm/s. The average E/e' is >14. TR velocity is < 2.8 m/s. Left atrial volume index is greater than 34 mL/m .]  8. Assessment of left atrial pressure (LAP): The cumulative findings suggest moderately elevated left atrial pressure (the E/A is > 0.8 and <2.0 plus 2 or 3 of 3 of the following present: Average E/e' > 14, TRvel > 2.8 m/s, and/or LA vol. index > 34 ml/m2 ).    When compared to prior echocardiogram 16 July 2016, no significant change.    Echocardiogram 16 July 2016:  1. Normal left ventricular size and systolic performance with ejection fraction of 60%.  2. Mild aortic insufficiency.  3. Mild to moderate mitral insufficiency.  4. Mild mitral stenosis with mean gradient of 4 mmHg.  5. Normal right ventricular size and systolic performance.  6. Mild left atrial enlargement. Right atrium reported normal.    Nuclear perfusion imaging study 23 February  2018:  1. No evidence of infarct or ischemia.  2. Normal left ventricular systolic performance with ejection fraction of 63%.    Regadenoson nuclear perfusion study 21 July 2016:  1. No evidence of infarct or ischemia.  2. Normal left ventricular systolic performance with ejection fraction of 70%.    Holter monitor 22 January 2018:  1. Largely normal 24-hour Holter monitor.     12-lead ECG (personally reviewed) 29 June 2022: Sinus rhythm with borderline first-degree AV block.  Otherwise normal ECG.    12-lead ECG (personally reviewed) 22 January 2021: Sinus rhythm.  QTc 490 ms.     12-lead ECG (personally reviewed) 7 March 2017: Sinus rhythm with borderline first-degree AV block.  QTc 483 ms.     12-lead ECG (personally reviewed) 30 November 2016: Sinus rhythm with manually calculated QTc 423 ms.     12-lead ECG (personally reviewed) 20 July 2016: Sinus rhythm with heart rate of 70 bpm.         Physical Examination       /64 (BP Location: Left arm, Patient Position: Sitting, Cuff Size: Adult Regular)   Pulse 72   Resp 18   Wt 70.3 kg (155 lb)   BMI 26.61 kg/m          Wt Readings from Last 3 Encounters:   06/29/22 70.3 kg (155 lb)   01/22/21 73 kg (161 lb)   01/03/20 70.3 kg (155 lb)       The patient is alert and oriented times three. Sclerae are anicteric. Mucosal membranes are moist. Jugular venous pressure is normal. No significant adenopathy/thyromegally appreciated. Lungs are clear with good expansion. On cardiovascular exam, the patient has a regular S1 and S2.  1/6 systolic murmur.  Abdomen is soft and non-tender. Extremities reveal no clubbing, cyanosis, or edema.         Medications  Allergies   Current Outpatient Medications   Medication Sig Dispense Refill     acetaminophen (TYLENOL) 325 MG tablet [ACETAMINOPHEN (TYLENOL) 325 MG TABLET] Take 650 mg by mouth every 6 (six) hours as needed for pain.       cholecalciferol, vitamin D3, (VITAMIN D3) 2,000 unit Tab [CHOLECALCIFEROL, VITAMIN D3,  (VITAMIN D3) 2,000 UNIT TAB] Take 2,000 Units by mouth daily.        citalopram (CELEXA) 20 MG tablet [CITALOPRAM (CELEXA) 20 MG TABLET] TAKE 1 TABLET(20 MG) BY MOUTH DAILY 90 tablet 2     fluticasone furoate-vilanteroL (BREO ELLIPTA) 100-25 mcg/dose inhaler [FLUTICASONE FUROATE-VILANTEROL (BREO ELLIPTA) 100-25 MCG/DOSE INHALER] Inhale 1 puff daily. OFFICE VISIT NEEDED FOR ANY FURTHER REFILLS (Patient taking differently: Inhale 1 puff into the lungs as needed) 60 each 0     furosemide (LASIX) 40 MG tablet Take 2 tablets (80 mg) by mouth daily 180 tablet 1     latanoprost (XALATAN) 0.005 % ophthalmic solution [LATANOPROST (XALATAN) 0.005 % OPHTHALMIC SOLUTION] Administer 1 drop to both eyes at bedtime.   12     levothyroxine (SYNTHROID, LEVOTHROID) 75 MCG tablet [LEVOTHYROXINE (SYNTHROID, LEVOTHROID) 75 MCG TABLET] TAKE 1 TABLET(75 MCG) BY MOUTH DAILY 90 tablet 3     pantoprazole (PROTONIX) 40 MG EC tablet Take 1 tablet (40 mg) by mouth daily 90 tablet 3     potassium chloride (K-DUR,KLOR-CON) 20 MEQ tablet [POTASSIUM CHLORIDE (K-DUR,KLOR-CON) 20 MEQ TABLET] Take 1 tablet (20 mEq total) by mouth daily. 90 tablet 3     rivaroxaban ANTICOAGULANT (XARELTO) 20 MG TABS tablet Take 1 tablet (20 mg) by mouth daily (with dinner) 90 tablet 1     simvastatin (ZOCOR) 20 MG tablet [SIMVASTATIN (ZOCOR) 20 MG TABLET] TAKE 1 TABLET(20 MG) BY MOUTH AT BEDTIME 90 tablet 3     sotaloL (BETAPACE) 80 MG tablet [SOTALOL (BETAPACE) 80 MG TABLET] Take 1 tablet (80 mg total) by mouth 2 (two) times a day. 180 tablet 0     triamcinolone (KENALOG) 0.1 % cream [TRIAMCINOLONE (KENALOG) 0.1 % CREAM] Apply 1 application topically 2 (two) times a day as needed (Rash).         Allergies   Allergen Reactions     Penicillins Hives          Lab Results    Chemistry/lipid CBC Cardiac Enzymes/BNP/TSH/INR   Recent Labs   Lab Test 04/29/19  1607   CHOL 173   HDL 47*   LDL 88   TRIG 192*     Recent Labs   Lab Test 04/29/19  1607 07/10/17  1357  07/17/16  0419   LDL 88 100 68     Recent Labs   Lab Test 12/28/19  1630      POTASSIUM 4.3   CHLORIDE 103   CO2 24   GLC 95   BUN 15   CR 0.80   GFRESTIMATED >60   BEBA 8.5     Recent Labs   Lab Test 12/28/19  1630 11/05/19  1034 04/29/19  1607   CR 0.80 0.78 0.80     No results for input(s): A1C in the last 43125 hours.       Recent Labs   Lab Test 11/01/20  1551   WBC 7.0   HGB 11.3*   HCT 35.7   MCV 94        Recent Labs   Lab Test 11/01/20  1551 12/28/19  1630 04/29/19  1607   HGB 11.3* 13.4 13.2    Recent Labs   Lab Test 12/29/19  0407 12/28/19  2150 12/28/19  1630   TROPONINI <0.01 <0.01 <0.01     Recent Labs   Lab Test 01/14/18  0942   *     Recent Labs   Lab Test 04/29/19  1607   TSH 1.94     Recent Labs   Lab Test 03/21/18  1357   INR 1.03        Medical History  Surgical History Family History Social History   Past Medical History:   Diagnosis Date     (HFpEF) heart failure with preserved ejection fraction (H) 07/15/2016    Elevated filling pressures     Gall stones      Hemorrhoids      History of anesthesia complications     slow to wake up      HLD (hyperlipidemia)      HTN (hypertension)      Hypothyroid      IBS (irritable bowel syndrome)      Migraine      Mitral valve stenosis      Nephrolithiasis      Osteopenia      Paroxysmal atrial fibrillation (H)      Sleep apnea      Past Surgical History:   Procedure Laterality Date     CHOLECYSTECTOMY       TUBAL LIGATION       ZZC TOTAL KNEE ARTHROPLASTY Right 3/14/2017    Procedure: RIGHT TOTAL KNEE ARTHROPLASTY;  Surgeon: Peña Chacon MD;  Location: [facility];  Service: Orthopedics     Family History   Problem Relation Age of Onset     Heart Disease Mother      Hyperlipidemia Mother      Breast Cancer Mother      Heart Disease Father      Hyperlipidemia Father      Cancer Sister      Hyperlipidemia Sister      Breast Cancer Paternal Aunt      LUNG DISEASE No family hx of         Social History     Socioeconomic History      Marital status:      Spouse name: Not on file     Number of children: Not on file     Years of education: Not on file     Highest education level: Not on file   Occupational History     Not on file   Tobacco Use     Smoking status: Former Smoker     Packs/day: 0.50     Years: 20.00     Pack years: 10.00     Types: Cigarettes     Quit date: 1976     Years since quittin.5     Smokeless tobacco: Never Used     Tobacco comment: no second hand smoke exposure   Substance and Sexual Activity     Alcohol use: Yes     Alcohol/week: 1.0 - 2.0 standard drink     Drug use: No     Sexual activity: Not on file   Other Topics Concern     Not on file   Social History Narrative     Not on file     Social Determinants of Health     Financial Resource Strain: Not on file   Food Insecurity: Not on file   Transportation Needs: Not on file   Physical Activity: Not on file   Stress: Not on file   Social Connections: Not on file   Intimate Partner Violence: Not on file   Housing Stability: Not on file

## 2022-06-29 NOTE — LETTER
6/29/2022    Felicitas Davila MD, MD  [address]  [facility] [address]    RE: Radha Arnold       Dear Colleague,     I had the pleasure of seeing Radha Arnold in the [facility].         [facility]   [address], [place] [address]   [email or web address]   OFFICE: [phone]          Thank you Felicitas Garcia for asking the [facility] team to participate in the care of your patient, Radha Arnold.     Impression and Plan     1.  Atrial fibrillation. Radha had spontaneously converted after additional diagnoses. She has been maintained on sotalol which has been efficacious in maintaining sinus rhythm.  QTC on twelve-lead ECG today is reasonable.  Recommend:    Continue sotalol 80 mg twice a day    Continue rivaroxaban for CVA prophylaxis in the event of recurrence particularly given patient is somewhat unaware of the rhythm disturbance.     2.  Valvular heart disease.  Echocardiogram 18 April 2022 revealed moderate mitral stenosis and moderate mitral insufficiency.  There is moderate to severe tricuspid insufficiency.  This would appear to be stable.    3.  Hypertension.  Blood pressure is reasonable in the office today at 110/64 mmHg.     4.  Thoracic aortic root enlargement.  CT scan 11 January 2021 revealed only mild enlargement of the ascending aorta measuring 4.3 cm.  This is unchanged from previous.     Plan follow-up in one year.    35 minutes spent reviewing prior records (including documentation, laboratory studies, cardiac testing/imaging), interview with patient along with physical exam, planning, and subsequent documentation/crafting of note).           History of Present Illness    Once again I would like to thank you again for asking me to participate in the care of your patient, Radha Arnold.  As you know, but to reiterate for my own records, Radha Arnold is a 86 year old female who was seen in  consultation 16 July 2016 for newly diagnosed atrial fibrillation.      In follow-up today, Radha states that she is doing well from a cardiovascular standpoint.  She reports no subjective palpitations.  She presently states her breathing somewhat short with certain activities though feels her exercise tolerance is at baseline.  She denies chest pain.   She denies any fevers, chills, or other constitutional symptoms.    Further review of systems is otherwise negative/noncontributory (medical record and 13 point review of systems reviewed as well and pertinent positives noted).         Cardiac Diagnostics      Echocardiogram 18 April 2022:  1. Left ventricular size, wall motion and function are normal. The ejection fraction is 60-65%.  2. There is mild to moderate concentric left ventricular hypertrophy.  3. Normal right ventricle size and systolic function.  4. The mitral valve leaflets appear thickened, but open well. There is moderate mitral stenosis. Mean gradient is 6 mmHg.  5. There is moderate (2+) mitral regurgitation. At heart rate of 74 bpm ERO is 0.09 cmÂ  with regurgitant volume of 15 cc, suspect this is an underestimation  6. There is moderately severe (3+) tricuspid regurgitation.  7. Right ventricular systolic pressure is elevated, consistent with moderate to severe pulmonary hypertension.  Pulmonary artery pressure is estimated 78 mmHg plus right atrial pressure.  8. A patent foramen ovale is present. Left to right flow seen.  9. The ascending aorta is Moderately dilated.    Compared to the prior study dated 29 December 2019, the tricuspid regurgitation is worse, pulmonic pressures are higher, PFO is now documented.    Echocardiogram 29 December 2019:  1. Normal left ventricular size and systolic performance with ejection fraction of 60 to 65%.  2. Mild aortic stenosis.    Mean gradient 9 mmHg.  3. Moderate mitral stenosis.    Mean gradient measured at 6 mmHg.  Mitral valve area by pressure half-time  formula 1.59 cm .  4. Mild to moderate mitral insufficiency.  5. Normal right ventricular size and systolic performance.  6. Severe left atrial enlargement.  Moderate right atrial enlargement.    Echocardiogram 22 March 2018:  1. Normal left ventricular size and systolic performance with ejection fraction of 65-70%.  2. Mild aortic stenosis.  3. Trace aortic insufficiency.  4. Moderate mitral stenosis with mean gradient of 7 mmHg.  5. Mild concentric increased left ventricular wall thickness.  6. Normal right ventricular size and systolic performance.  7. Moderate left atrial enlargement.  8. Right ventricular systolic pressure relative to right pressure is mildly increased.  Pulmonary artery pressure estimated at 40-45 mmHg plus right atrial pressure.    Echocardiogram 22 October 2017:  1. Normal left ventricular size and systolic performance with ejection fraction of 65%.  2. Mild aortic stenosis.  3. Trace aortic insufficiency.  4. Mild to moderate mitral insufficiency.  5. Normal right ventricular size and systolic performance.  6. Moderate left atrial enlargement.  Mild right atrial enlargement.  7. Assessment of LV Diastolic Function: The cumulative findings suggest impaired diastolic filling [The septal e' velocity is < 7 cm/s & lateral e' velocity is < 10 cm/s. The average E/e' is >14. TR velocity is < 2.8 m/s. Left atrial volume index is greater than 34 mL/m .]  8. Assessment of left atrial pressure (LAP): The cumulative findings suggest moderately elevated left atrial pressure (the E/A is > 0.8 and <2.0 plus 2 or 3 of 3 of the following present: Average E/e' > 14, TRvel > 2.8 m/s, and/or LA vol. index > 34 ml/m2 ).    When compared to prior echocardiogram 16 July 2016, no significant change.    Echocardiogram 16 July 2016:  1. Normal left ventricular size and systolic performance with ejection fraction of 60%.  2. Mild aortic insufficiency.  3. Mild to moderate mitral insufficiency.  4. Mild mitral  stenosis with mean gradient of 4 mmHg.  5. Normal right ventricular size and systolic performance.  6. Mild left atrial enlargement. Right atrium reported normal.    Nuclear perfusion imaging study 23 February 2018:  1. No evidence of infarct or ischemia.  2. Normal left ventricular systolic performance with ejection fraction of 63%.    Regadenoson nuclear perfusion study 21 July 2016:  1. No evidence of infarct or ischemia.  2. Normal left ventricular systolic performance with ejection fraction of 70%.    Holter monitor 22 January 2018:  1. Largely normal 24-hour Holter monitor.     12-lead ECG (personally reviewed) 29 June 2022: Sinus rhythm with borderline first-degree AV block.  Otherwise normal ECG.    12-lead ECG (personally reviewed) 22 January 2021: Sinus rhythm.  QTc 490 ms.     12-lead ECG (personally reviewed) 7 March 2017: Sinus rhythm with borderline first-degree AV block.  QTc 483 ms.     12-lead ECG (personally reviewed) 30 November 2016: Sinus rhythm with manually calculated QTc 423 ms.     12-lead ECG (personally reviewed) 20 July 2016: Sinus rhythm with heart rate of 70 bpm.         Physical Examination       /64 (BP Location: Left arm, Patient Position: Sitting, Cuff Size: Adult Regular)   Pulse 72   Resp 18   Wt 70.3 kg (155 lb)   BMI 26.61 kg/m          Wt Readings from Last 3 Encounters:   06/29/22 70.3 kg (155 lb)   01/22/21 73 kg (161 lb)   01/03/20 70.3 kg (155 lb)       The patient is alert and oriented times three. Sclerae are anicteric. Mucosal membranes are moist. Jugular venous pressure is normal. No significant adenopathy/thyromegally appreciated. Lungs are clear with good expansion. On cardiovascular exam, the patient has a regular S1 and S2.  1/6 systolic murmur.  Abdomen is soft and non-tender. Extremities reveal no clubbing, cyanosis, or edema.         Medications  Allergies   Current Outpatient Medications   Medication Sig Dispense Refill     acetaminophen (TYLENOL) 325  MG tablet [ACETAMINOPHEN (TYLENOL) 325 MG TABLET] Take 650 mg by mouth every 6 (six) hours as needed for pain.       cholecalciferol, vitamin D3, (VITAMIN D3) 2,000 unit Tab [CHOLECALCIFEROL, VITAMIN D3, (VITAMIN D3) 2,000 UNIT TAB] Take 2,000 Units by mouth daily.        citalopram (CELEXA) 20 MG tablet [CITALOPRAM (CELEXA) 20 MG TABLET] TAKE 1 TABLET(20 MG) BY MOUTH DAILY 90 tablet 2     fluticasone furoate-vilanteroL (BREO ELLIPTA) 100-25 mcg/dose inhaler [FLUTICASONE FUROATE-VILANTEROL (BREO ELLIPTA) 100-25 MCG/DOSE INHALER] Inhale 1 puff daily. OFFICE VISIT NEEDED FOR ANY FURTHER REFILLS (Patient taking differently: Inhale 1 puff into the lungs as needed) 60 each 0     furosemide (LASIX) 40 MG tablet Take 2 tablets (80 mg) by mouth daily 180 tablet 1     latanoprost (XALATAN) 0.005 % ophthalmic solution [LATANOPROST (XALATAN) 0.005 % OPHTHALMIC SOLUTION] Administer 1 drop to both eyes at bedtime.   12     levothyroxine (SYNTHROID, LEVOTHROID) 75 MCG tablet [LEVOTHYROXINE (SYNTHROID, LEVOTHROID) 75 MCG TABLET] TAKE 1 TABLET(75 MCG) BY MOUTH DAILY 90 tablet 3     pantoprazole (PROTONIX) 40 MG EC tablet Take 1 tablet (40 mg) by mouth daily 90 tablet 3     potassium chloride (K-DUR,KLOR-CON) 20 MEQ tablet [POTASSIUM CHLORIDE (K-DUR,KLOR-CON) 20 MEQ TABLET] Take 1 tablet (20 mEq total) by mouth daily. 90 tablet 3     rivaroxaban ANTICOAGULANT (XARELTO) 20 MG TABS tablet Take 1 tablet (20 mg) by mouth daily (with dinner) 90 tablet 1     simvastatin (ZOCOR) 20 MG tablet [SIMVASTATIN (ZOCOR) 20 MG TABLET] TAKE 1 TABLET(20 MG) BY MOUTH AT BEDTIME 90 tablet 3     sotaloL (BETAPACE) 80 MG tablet [SOTALOL (BETAPACE) 80 MG TABLET] Take 1 tablet (80 mg total) by mouth 2 (two) times a day. 180 tablet 0     triamcinolone (KENALOG) 0.1 % cream [TRIAMCINOLONE (KENALOG) 0.1 % CREAM] Apply 1 application topically 2 (two) times a day as needed (Rash).         Allergies   Allergen Reactions     Penicillins Hives          Lab Results     Chemistry/lipid CBC Cardiac Enzymes/BNP/TSH/INR   Recent Labs   Lab Test 04/29/19  1607   CHOL 173   HDL 47*   LDL 88   TRIG 192*     Recent Labs   Lab Test 04/29/19  1607 07/10/17  1357 07/17/16  0419   LDL 88 100 68     Recent Labs   Lab Test 12/28/19  1630      POTASSIUM 4.3   CHLORIDE 103   CO2 24   GLC 95   BUN 15   CR 0.80   GFRESTIMATED >60   BEBA 8.5     Recent Labs   Lab Test 12/28/19  1630 11/05/19  1034 04/29/19  1607   CR 0.80 0.78 0.80     No results for input(s): A1C in the last 50851 hours.       Recent Labs   Lab Test 11/01/20  1551   WBC 7.0   HGB 11.3*   HCT 35.7   MCV 94        Recent Labs   Lab Test 11/01/20  1551 12/28/19  1630 04/29/19  1607   HGB 11.3* 13.4 13.2    Recent Labs   Lab Test 12/29/19  0407 12/28/19  2150 12/28/19  1630   TROPONINI <0.01 <0.01 <0.01     Recent Labs   Lab Test 01/14/18  0942   *     Recent Labs   Lab Test 04/29/19  1607   TSH 1.94     Recent Labs   Lab Test 03/21/18  1357   INR 1.03        Medical History  Surgical History Family History Social History   Past Medical History:   Diagnosis Date     (HFpEF) heart failure with preserved ejection fraction (H) 07/15/2016    Elevated filling pressures     Gall stones      Hemorrhoids      History of anesthesia complications     slow to wake up      HLD (hyperlipidemia)      HTN (hypertension)      Hypothyroid      IBS (irritable bowel syndrome)      Migraine      Mitral valve stenosis      Nephrolithiasis      Osteopenia      Paroxysmal atrial fibrillation (H)      Sleep apnea      Past Surgical History:   Procedure Laterality Date     CHOLECYSTECTOMY       TUBAL LIGATION       ZZC TOTAL KNEE ARTHROPLASTY Right 3/14/2017    Procedure: RIGHT TOTAL KNEE ARTHROPLASTY;  Surgeon: Peña Chacon MD;  Location: [facility] Main OR;  Service: Orthopedics     Family History   Problem Relation Age of Onset     Heart Disease Mother      Hyperlipidemia Mother      Breast Cancer Mother      Heart Disease Father       Hyperlipidemia Father      Cancer Sister      Hyperlipidemia Sister      Breast Cancer Paternal Aunt      LUNG DISEASE No family hx of         Social History     Socioeconomic History     Marital status:      Spouse name: Not on file     Number of children: Not on file     Years of education: Not on file     Highest education level: Not on file   Occupational History     Not on file   Tobacco Use     Smoking status: Former Smoker     Packs/day: 0.50     Years: 20.00     Pack years: 10.00     Types: Cigarettes     Quit date: 1976     Years since quittin.5     Smokeless tobacco: Never Used     Tobacco comment: no second hand smoke exposure   Substance and Sexual Activity     Alcohol use: Yes     Alcohol/week: 1.0 - 2.0 standard drink     Drug use: No     Sexual activity: Not on file   Other Topics Concern     Not on file   Social History Narrative     Not on file     Social Determinants of Health     Financial Resource Strain: Not on file   Food Insecurity: Not on file   Transportation Needs: Not on file   Physical Activity: Not on file   Stress: Not on file   Social Connections: Not on file   Intimate Partner Violence: Not on file   Housing Stability: Not on file                      Thank you for allowing me to participate in the care of your patient.    Sincerely,   Lory Paulson MD   [facility]  cc: Referred Self

## 2022-07-23 ENCOUNTER — HEALTH MAINTENANCE LETTER (OUTPATIENT)
Age: 87
End: 2022-07-23

## 2022-10-01 ENCOUNTER — HEALTH MAINTENANCE LETTER (OUTPATIENT)
Age: 87
End: 2022-10-01

## 2022-12-29 NOTE — TELEPHONE ENCOUNTER
Encounter Details    Encounter Details  Date Type Department Care Team Description   04/21/2022 Office Visit [facility]    [address] [place] [id number] [phone]   Felicitas Davila MD    [address] [place] [address]    [phone] (Work)    [phone] (Fax)   Medication Management (6 mo follow up); Immunization/Injection (COVID-19 vaccine

## 2023-01-01 ENCOUNTER — TELEPHONE (OUTPATIENT)
Dept: CARDIOLOGY | Facility: CLINIC | Age: 88
End: 2023-01-01
Payer: MEDICARE

## 2023-01-01 ENCOUNTER — HEALTH MAINTENANCE LETTER (OUTPATIENT)
Age: 88
End: 2023-01-01

## 2023-01-01 ENCOUNTER — TRANSFERRED RECORDS (OUTPATIENT)
Dept: HEALTH INFORMATION MANAGEMENT | Facility: CLINIC | Age: 88
End: 2023-01-01
Payer: MEDICARE

## 2023-01-01 DIAGNOSIS — I50.9 CHF (CONGESTIVE HEART FAILURE) (H): ICD-10-CM

## 2023-01-01 DIAGNOSIS — I48.0 PAROXYSMAL ATRIAL FIBRILLATION (H): ICD-10-CM

## 2023-01-01 RX ORDER — FUROSEMIDE 80 MG
80 TABLET ORAL DAILY
Qty: 30 TABLET | Refills: 1 | Status: SHIPPED | OUTPATIENT
Start: 2023-01-01 | End: 2023-01-01

## 2023-01-01 RX ORDER — FUROSEMIDE 80 MG
80 TABLET ORAL DAILY
Qty: 90 TABLET | Refills: 0 | Status: SHIPPED | OUTPATIENT
Start: 2023-01-01

## 2023-01-01 RX ORDER — SOTALOL HYDROCHLORIDE 80 MG/1
TABLET ORAL
Qty: 180 TABLET | Refills: 0 | Status: SHIPPED | OUTPATIENT
Start: 2023-01-01

## 2023-09-20 NOTE — TELEPHONE ENCOUNTER
[facility] Call Center    Phone Message    May a detailed message be left on voicemail: yes     Reason for Call: Medication Refill Request    Has the patient contacted the pharmacy for the refill? Yes   Name of medication being requested: Furosemide 40mg  Provider who prescribed the medication: Dr. lau  Pharmacy: [facility]  Date medication is needed: 09/20/2023       Action Taken: Other: Cardio    Travel Screening: Not Applicable

## 2023-09-20 NOTE — TELEPHONE ENCOUNTER
Rx renewed for 30 days and message sent patient will need to schedule follow up for further refills.